# Patient Record
Sex: MALE | Race: WHITE | Employment: OTHER | URBAN - METROPOLITAN AREA
[De-identification: names, ages, dates, MRNs, and addresses within clinical notes are randomized per-mention and may not be internally consistent; named-entity substitution may affect disease eponyms.]

---

## 2017-11-06 ENCOUNTER — HOSPITAL ENCOUNTER (OUTPATIENT)
Dept: CT IMAGING | Age: 82
Discharge: HOME | End: 2017-11-06
Attending: PHYSICAL MEDICINE & REHABILITATION
Payer: MEDICARE

## 2017-11-06 DIAGNOSIS — M54.41 LOW BACK PAIN WITH RIGHT-SIDED SCIATICA: ICD-10-CM

## 2017-11-06 DIAGNOSIS — M47.817 LUMBOSACRAL SPONDYLOSIS WITHOUT MYELOPATHY: ICD-10-CM

## 2017-11-06 PROCEDURE — 72131 CT LUMBAR SPINE W/O DYE: CPT

## 2019-01-01 ENCOUNTER — HOSPITAL ENCOUNTER (INPATIENT)
Age: 84
LOS: 4 days | Discharge: HOME | DRG: 872 | End: 2019-11-01
Attending: EMERGENCY MEDICINE | Admitting: INTERNAL MEDICINE
Payer: MEDICARE

## 2019-01-01 ENCOUNTER — APPOINTMENT (OUTPATIENT)
Dept: ULTRASOUND IMAGING | Age: 84
End: 2019-01-01
Attending: EMERGENCY MEDICINE
Payer: MEDICARE

## 2019-01-01 ENCOUNTER — APPOINTMENT (OUTPATIENT)
Dept: NON INVASIVE DIAGNOSTICS | Age: 84
DRG: 872 | End: 2019-01-01
Attending: INTERNAL MEDICINE
Payer: MEDICARE

## 2019-01-01 ENCOUNTER — APPOINTMENT (OUTPATIENT)
Dept: GENERAL RADIOLOGY | Age: 84
DRG: 291 | End: 2019-01-01
Attending: EMERGENCY MEDICINE
Payer: MEDICARE

## 2019-01-01 ENCOUNTER — HOSPITAL ENCOUNTER (OUTPATIENT)
Dept: GENERAL RADIOLOGY | Age: 84
Discharge: HOME | End: 2019-11-26
Payer: MEDICARE

## 2019-01-01 ENCOUNTER — APPOINTMENT (OUTPATIENT)
Dept: ULTRASOUND IMAGING | Age: 84
DRG: 291 | End: 2019-01-01
Attending: INTERNAL MEDICINE
Payer: MEDICARE

## 2019-01-01 ENCOUNTER — APPOINTMENT (OUTPATIENT)
Dept: CT IMAGING | Age: 84
DRG: 872 | End: 2019-01-01
Attending: INTERNAL MEDICINE
Payer: MEDICARE

## 2019-01-01 ENCOUNTER — HOSPITAL ENCOUNTER (INPATIENT)
Age: 84
LOS: 2 days | DRG: 951 | End: 2019-12-24
Attending: FAMILY MEDICINE | Admitting: INTERNAL MEDICINE
Payer: OTHER MISCELLANEOUS

## 2019-01-01 ENCOUNTER — APPOINTMENT (OUTPATIENT)
Dept: GENERAL RADIOLOGY | Age: 84
DRG: 872 | End: 2019-01-01
Attending: EMERGENCY MEDICINE
Payer: MEDICARE

## 2019-01-01 ENCOUNTER — HOSPITAL ENCOUNTER (EMERGENCY)
Age: 84
Discharge: HOME | End: 2019-05-09
Attending: EMERGENCY MEDICINE
Payer: MEDICARE

## 2019-01-01 ENCOUNTER — HOSPICE ADMISSION (OUTPATIENT)
Dept: HOSPICE | Facility: HOSPICE | Age: 84
End: 2019-01-01
Payer: MEDICARE

## 2019-01-01 ENCOUNTER — HOSPITAL ENCOUNTER (INPATIENT)
Age: 84
LOS: 3 days | Discharge: HOSPICE | DRG: 291 | End: 2019-12-22
Attending: EMERGENCY MEDICINE | Admitting: INTERNAL MEDICINE
Payer: MEDICARE

## 2019-01-01 ENCOUNTER — APPOINTMENT (OUTPATIENT)
Dept: GENERAL RADIOLOGY | Age: 84
DRG: 872 | End: 2019-01-01
Attending: NURSE PRACTITIONER
Payer: MEDICARE

## 2019-01-01 ENCOUNTER — APPOINTMENT (OUTPATIENT)
Dept: VASCULAR SURGERY | Age: 84
DRG: 872 | End: 2019-01-01
Attending: EMERGENCY MEDICINE
Payer: MEDICARE

## 2019-01-01 VITALS
DIASTOLIC BLOOD PRESSURE: 75 MMHG | SYSTOLIC BLOOD PRESSURE: 119 MMHG | HEART RATE: 89 BPM | OXYGEN SATURATION: 94 % | HEIGHT: 72 IN | BODY MASS INDEX: 28.84 KG/M2 | RESPIRATION RATE: 18 BRPM | TEMPERATURE: 98.5 F | WEIGHT: 212.9 LBS

## 2019-01-01 VITALS
HEART RATE: 106 BPM | SYSTOLIC BLOOD PRESSURE: 82 MMHG | DIASTOLIC BLOOD PRESSURE: 53 MMHG | OXYGEN SATURATION: 94 % | RESPIRATION RATE: 16 BRPM | TEMPERATURE: 98.6 F

## 2019-01-01 VITALS
OXYGEN SATURATION: 100 % | BODY MASS INDEX: 31.75 KG/M2 | HEIGHT: 70 IN | TEMPERATURE: 97.4 F | DIASTOLIC BLOOD PRESSURE: 67 MMHG | SYSTOLIC BLOOD PRESSURE: 103 MMHG | WEIGHT: 221.78 LBS | HEART RATE: 106 BPM | RESPIRATION RATE: 20 BRPM

## 2019-01-01 VITALS
RESPIRATION RATE: 16 BRPM | WEIGHT: 210.76 LBS | HEART RATE: 89 BPM | DIASTOLIC BLOOD PRESSURE: 78 MMHG | BODY MASS INDEX: 27.05 KG/M2 | TEMPERATURE: 97.4 F | OXYGEN SATURATION: 99 % | HEIGHT: 74 IN | SYSTOLIC BLOOD PRESSURE: 134 MMHG

## 2019-01-01 DIAGNOSIS — R53.1 GENERALIZED WEAKNESS: ICD-10-CM

## 2019-01-01 DIAGNOSIS — I95.9 HYPOTENSION, UNSPECIFIED HYPOTENSION TYPE: Primary | ICD-10-CM

## 2019-01-01 DIAGNOSIS — F41.9 ANXIETY: ICD-10-CM

## 2019-01-01 DIAGNOSIS — Z71.89 GOALS OF CARE, COUNSELING/DISCUSSION: ICD-10-CM

## 2019-01-01 DIAGNOSIS — I50.20 HEART FAILURE WITH REDUCED EJECTION FRACTION, NYHA CLASS III (HCC): ICD-10-CM

## 2019-01-01 DIAGNOSIS — K11.7 INCREASED OROPHARYNGEAL SECRETIONS: ICD-10-CM

## 2019-01-01 DIAGNOSIS — R63.0 ANOREXIA: ICD-10-CM

## 2019-01-01 DIAGNOSIS — L98.429 STAGE 2 SKIN ULCER OF SACRAL REGION (HCC): ICD-10-CM

## 2019-01-01 DIAGNOSIS — I50.22 CHRONIC SYSTOLIC CHF (CONGESTIVE HEART FAILURE) (HCC): ICD-10-CM

## 2019-01-01 DIAGNOSIS — R50.9 FEVER, UNSPECIFIED FEVER CAUSE: Primary | ICD-10-CM

## 2019-01-01 DIAGNOSIS — R06.00 DYSPNEA, UNSPECIFIED TYPE: ICD-10-CM

## 2019-01-01 DIAGNOSIS — L03.116 CELLULITIS OF LEFT LOWER EXTREMITY: ICD-10-CM

## 2019-01-01 DIAGNOSIS — R52 GENERALIZED PAIN: ICD-10-CM

## 2019-01-01 DIAGNOSIS — R60.0 LEG EDEMA: Primary | ICD-10-CM

## 2019-01-01 DIAGNOSIS — E87.70 FLUID EXCESS: ICD-10-CM

## 2019-01-01 DIAGNOSIS — Z51.5 COMFORT MEASURES ONLY STATUS: ICD-10-CM

## 2019-01-01 LAB
ALBUMIN SERPL-MCNC: 3.1 G/DL (ref 3.5–5)
ALBUMIN SERPL-MCNC: 3.3 G/DL (ref 3.5–5)
ALBUMIN SERPL-MCNC: 3.4 G/DL (ref 3.5–5)
ALBUMIN SERPL-MCNC: 3.8 G/DL (ref 3.5–5)
ALBUMIN/GLOB SERPL: 0.8 {RATIO} (ref 1.1–2.2)
ALBUMIN/GLOB SERPL: 0.9 {RATIO} (ref 1.1–2.2)
ALBUMIN/GLOB SERPL: 1 {RATIO} (ref 1.1–2.2)
ALP SERPL-CCNC: 58 U/L (ref 45–117)
ALP SERPL-CCNC: 78 U/L (ref 45–117)
ALP SERPL-CCNC: 98 U/L (ref 45–117)
ALT SERPL-CCNC: 14 U/L (ref 12–78)
ALT SERPL-CCNC: 16 U/L (ref 12–78)
ALT SERPL-CCNC: 22 U/L (ref 12–78)
ANION GAP SERPL CALC-SCNC: 3 MMOL/L (ref 5–15)
ANION GAP SERPL CALC-SCNC: 3 MMOL/L (ref 5–15)
ANION GAP SERPL CALC-SCNC: 5 MMOL/L (ref 5–15)
ANION GAP SERPL CALC-SCNC: 6 MMOL/L (ref 5–15)
ANION GAP SERPL CALC-SCNC: 7 MMOL/L (ref 5–15)
ANION GAP SERPL CALC-SCNC: 8 MMOL/L (ref 5–15)
APPEARANCE UR: CLEAR
APPEARANCE UR: CLEAR
AST SERPL-CCNC: 17 U/L (ref 15–37)
AST SERPL-CCNC: 20 U/L (ref 15–37)
AST SERPL-CCNC: 31 U/L (ref 15–37)
ATRIAL RATE: 70 BPM
ATRIAL RATE: 91 BPM
AV VELOCITY RATIO: 0.65
BACTERIA SPEC CULT: NORMAL
BACTERIA URNS QL MICRO: NEGATIVE /HPF
BACTERIA URNS QL MICRO: NEGATIVE /HPF
BASOPHILS # BLD: 0 K/UL (ref 0–0.1)
BASOPHILS NFR BLD: 0 % (ref 0–1)
BASOPHILS NFR BLD: 1 % (ref 0–1)
BASOPHILS NFR BLD: 1 % (ref 0–1)
BILIRUB SERPL-MCNC: 1 MG/DL (ref 0.2–1)
BILIRUB SERPL-MCNC: 1.3 MG/DL (ref 0.2–1)
BILIRUB SERPL-MCNC: 1.4 MG/DL (ref 0.2–1)
BILIRUB UR QL: NEGATIVE
BILIRUB UR QL: NEGATIVE
BNP SERPL-MCNC: 3863 PG/ML
BNP SERPL-MCNC: ABNORMAL PG/ML
BNP SERPL-MCNC: ABNORMAL PG/ML
BUN SERPL-MCNC: 15 MG/DL (ref 6–20)
BUN SERPL-MCNC: 16 MG/DL (ref 6–20)
BUN SERPL-MCNC: 16 MG/DL (ref 6–20)
BUN SERPL-MCNC: 23 MG/DL (ref 6–20)
BUN SERPL-MCNC: 28 MG/DL (ref 6–20)
BUN SERPL-MCNC: 30 MG/DL (ref 6–20)
BUN SERPL-MCNC: 36 MG/DL (ref 6–20)
BUN SERPL-MCNC: 38 MG/DL (ref 6–20)
BUN SERPL-MCNC: 39 MG/DL (ref 6–20)
BUN SERPL-MCNC: 43 MG/DL (ref 6–20)
BUN/CREAT SERPL: 15 (ref 12–20)
BUN/CREAT SERPL: 16 (ref 12–20)
BUN/CREAT SERPL: 17 (ref 12–20)
BUN/CREAT SERPL: 17 (ref 12–20)
BUN/CREAT SERPL: 18 (ref 12–20)
BUN/CREAT SERPL: 19 (ref 12–20)
BUN/CREAT SERPL: 20 (ref 12–20)
BUN/CREAT SERPL: 21 (ref 12–20)
BUN/CREAT SERPL: 26 (ref 12–20)
BUN/CREAT SERPL: 27 (ref 12–20)
CALCIUM SERPL-MCNC: 8.2 MG/DL (ref 8.5–10.1)
CALCIUM SERPL-MCNC: 8.4 MG/DL (ref 8.5–10.1)
CALCIUM SERPL-MCNC: 8.5 MG/DL (ref 8.5–10.1)
CALCIUM SERPL-MCNC: 8.5 MG/DL (ref 8.5–10.1)
CALCIUM SERPL-MCNC: 8.6 MG/DL (ref 8.5–10.1)
CALCIUM SERPL-MCNC: 8.6 MG/DL (ref 8.5–10.1)
CALCIUM SERPL-MCNC: 8.8 MG/DL (ref 8.5–10.1)
CALCIUM SERPL-MCNC: 9.2 MG/DL (ref 8.5–10.1)
CALCIUM SERPL-MCNC: 9.3 MG/DL (ref 8.5–10.1)
CALCIUM SERPL-MCNC: 9.4 MG/DL (ref 8.5–10.1)
CALCULATED P AXIS, ECG09: 55 DEGREES
CALCULATED R AXIS, ECG10: 14 DEGREES
CALCULATED R AXIS, ECG10: 39 DEGREES
CALCULATED T AXIS, ECG11: 162 DEGREES
CALCULATED T AXIS, ECG11: 55 DEGREES
CHLORIDE SERPL-SCNC: 105 MMOL/L (ref 97–108)
CHLORIDE SERPL-SCNC: 105 MMOL/L (ref 97–108)
CHLORIDE SERPL-SCNC: 107 MMOL/L (ref 97–108)
CHLORIDE SERPL-SCNC: 110 MMOL/L (ref 97–108)
CHLORIDE SERPL-SCNC: 111 MMOL/L (ref 97–108)
CHLORIDE SERPL-SCNC: 112 MMOL/L (ref 97–108)
CHLORIDE SERPL-SCNC: 113 MMOL/L (ref 97–108)
CHLORIDE SERPL-SCNC: 113 MMOL/L (ref 97–108)
CHLORIDE UR-SCNC: 14 MMOL/L
CO2 SERPL-SCNC: 23 MMOL/L (ref 21–32)
CO2 SERPL-SCNC: 25 MMOL/L (ref 21–32)
CO2 SERPL-SCNC: 26 MMOL/L (ref 21–32)
CO2 SERPL-SCNC: 26 MMOL/L (ref 21–32)
CO2 SERPL-SCNC: 27 MMOL/L (ref 21–32)
CO2 SERPL-SCNC: 27 MMOL/L (ref 21–32)
CO2 SERPL-SCNC: 29 MMOL/L (ref 21–32)
COLOR UR: ABNORMAL
COLOR UR: ABNORMAL
COMMENT, HOLDF: NORMAL
COMMENT, HOLDF: NORMAL
CREAT SERPL-MCNC: 0.94 MG/DL (ref 0.7–1.3)
CREAT SERPL-MCNC: 0.96 MG/DL (ref 0.7–1.3)
CREAT SERPL-MCNC: 1.03 MG/DL (ref 0.7–1.3)
CREAT SERPL-MCNC: 1.08 MG/DL (ref 0.7–1.3)
CREAT SERPL-MCNC: 1.09 MG/DL (ref 0.7–1.3)
CREAT SERPL-MCNC: 1.1 MG/DL (ref 0.7–1.3)
CREAT SERPL-MCNC: 1.83 MG/DL (ref 0.7–1.3)
CREAT SERPL-MCNC: 2.21 MG/DL (ref 0.7–1.3)
CREAT SERPL-MCNC: 2.21 MG/DL (ref 0.7–1.3)
CREAT SERPL-MCNC: 2.35 MG/DL (ref 0.7–1.3)
CREAT UR-MCNC: 193 MG/DL
DATE LAST DOSE: ABNORMAL
DIAGNOSIS, 93000: NORMAL
DIAGNOSIS, 93000: NORMAL
DIFFERENTIAL METHOD BLD: ABNORMAL
ECHO AO ROOT DIAM: 3.47 CM
ECHO AV AREA PEAK VELOCITY: 2.2 CM2
ECHO AV PEAK GRADIENT: 6.5 MMHG
ECHO AV PEAK VELOCITY: 127.28 CM/S
ECHO LA AREA 4C: 33.9 CM2
ECHO LA MAJOR AXIS: 6.56 CM
ECHO LA TO AORTIC ROOT RATIO: 1.89
ECHO LA VOL 2C: 188.69 ML (ref 18–58)
ECHO LA VOL 4C: 124.71 ML (ref 18–58)
ECHO LA VOL BP: 165.55 ML (ref 18–58)
ECHO LA VOL/BSA BIPLANE: 77.54 ML/M2 (ref 16–28)
ECHO LA VOLUME INDEX A2C: 88.38 ML/M2 (ref 16–28)
ECHO LA VOLUME INDEX A4C: 58.41 ML/M2 (ref 16–28)
ECHO LV INTERNAL DIMENSION DIASTOLIC: 5.96 CM (ref 4.2–5.9)
ECHO LV INTERNAL DIMENSION SYSTOLIC: 5.62 CM
ECHO LV IVSD: 1.03 CM (ref 0.6–1)
ECHO LV MASS 2D: 296.6 G (ref 88–224)
ECHO LV MASS INDEX 2D: 138.9 G/M2 (ref 49–115)
ECHO LV POSTERIOR WALL DIASTOLIC: 1 CM (ref 0.6–1)
ECHO LVOT DIAM: 2.06 CM
ECHO LVOT PEAK GRADIENT: 2.8 MMHG
ECHO LVOT PEAK VELOCITY: 82.97 CM/S
ECHO LVOT SV: 48.9 ML
ECHO LVOT VTI: 14.74 CM
ECHO MV A VELOCITY: 85.73 CM/S
ECHO MV AREA PHT: 4.9 CM2
ECHO MV E DECELERATION TIME (DT): 155.4 MS
ECHO MV E VELOCITY: 62.49 CM/S
ECHO MV E/A RATIO: 0.73
ECHO MV PRESSURE HALF TIME (PHT): 45.1 MS
ECHO PV MAX VELOCITY: 61.78 CM/S
ECHO PV PEAK GRADIENT: 1.5 MMHG
ECHO TV REGURGITANT MAX VELOCITY: 234.97 CM/S
ECHO TV REGURGITANT PEAK GRADIENT: 22.1 MMHG
EOSINOPHIL # BLD: 0 K/UL (ref 0–0.4)
EOSINOPHIL # BLD: 0 K/UL (ref 0–0.4)
EOSINOPHIL # BLD: 0.1 K/UL (ref 0–0.4)
EOSINOPHIL # BLD: 0.1 K/UL (ref 0–0.4)
EOSINOPHIL # BLD: 0.3 K/UL (ref 0–0.4)
EOSINOPHIL NFR BLD: 0 % (ref 0–7)
EOSINOPHIL NFR BLD: 0 % (ref 0–7)
EOSINOPHIL NFR BLD: 1 % (ref 0–7)
EOSINOPHIL NFR BLD: 2 % (ref 0–7)
EOSINOPHIL NFR BLD: 7 % (ref 0–7)
EPITH CASTS URNS QL MICRO: ABNORMAL /LPF
EPITH CASTS URNS QL MICRO: ABNORMAL /LPF
ERYTHROCYTE [DISTWIDTH] IN BLOOD BY AUTOMATED COUNT: 14.9 % (ref 11.5–14.5)
ERYTHROCYTE [DISTWIDTH] IN BLOOD BY AUTOMATED COUNT: 15.1 % (ref 11.5–14.5)
ERYTHROCYTE [DISTWIDTH] IN BLOOD BY AUTOMATED COUNT: 15.2 % (ref 11.5–14.5)
ERYTHROCYTE [DISTWIDTH] IN BLOOD BY AUTOMATED COUNT: 18.6 % (ref 11.5–14.5)
ERYTHROCYTE [DISTWIDTH] IN BLOOD BY AUTOMATED COUNT: 18.6 % (ref 11.5–14.5)
ERYTHROCYTE [DISTWIDTH] IN BLOOD BY AUTOMATED COUNT: 19.1 % (ref 11.5–14.5)
ERYTHROCYTE [DISTWIDTH] IN BLOOD BY AUTOMATED COUNT: 19.1 % (ref 11.5–14.5)
EST. AVERAGE GLUCOSE BLD GHB EST-MCNC: 105 MG/DL
FLUAV AG NPH QL IA: NEGATIVE
FLUBV AG NOSE QL IA: NEGATIVE
GLOBULIN SER CALC-MCNC: 3.3 G/DL (ref 2–4)
GLOBULIN SER CALC-MCNC: 3.9 G/DL (ref 2–4)
GLOBULIN SER CALC-MCNC: 4 G/DL (ref 2–4)
GLUCOSE SERPL-MCNC: 101 MG/DL (ref 65–100)
GLUCOSE SERPL-MCNC: 116 MG/DL (ref 65–100)
GLUCOSE SERPL-MCNC: 130 MG/DL (ref 65–100)
GLUCOSE SERPL-MCNC: 139 MG/DL (ref 65–100)
GLUCOSE SERPL-MCNC: 86 MG/DL (ref 65–100)
GLUCOSE SERPL-MCNC: 88 MG/DL (ref 65–100)
GLUCOSE SERPL-MCNC: 89 MG/DL (ref 65–100)
GLUCOSE SERPL-MCNC: 92 MG/DL (ref 65–100)
GLUCOSE SERPL-MCNC: 94 MG/DL (ref 65–100)
GLUCOSE SERPL-MCNC: 95 MG/DL (ref 65–100)
GLUCOSE UR STRIP.AUTO-MCNC: NEGATIVE MG/DL
GLUCOSE UR STRIP.AUTO-MCNC: NEGATIVE MG/DL
HBA1C MFR BLD: 5.3 % (ref 4.2–6.3)
HCT VFR BLD AUTO: 32.7 % (ref 36.6–50.3)
HCT VFR BLD AUTO: 33.7 % (ref 36.6–50.3)
HCT VFR BLD AUTO: 33.7 % (ref 36.6–50.3)
HCT VFR BLD AUTO: 34.6 % (ref 36.6–50.3)
HCT VFR BLD AUTO: 38 % (ref 36.6–50.3)
HCT VFR BLD AUTO: 38.8 % (ref 36.6–50.3)
HCT VFR BLD AUTO: 40.7 % (ref 36.6–50.3)
HGB BLD-MCNC: 11.2 G/DL (ref 12.1–17)
HGB BLD-MCNC: 11.4 G/DL (ref 12.1–17)
HGB BLD-MCNC: 11.4 G/DL (ref 12.1–17)
HGB BLD-MCNC: 11.5 G/DL (ref 12.1–17)
HGB BLD-MCNC: 12.5 G/DL (ref 12.1–17)
HGB BLD-MCNC: 13.2 G/DL (ref 12.1–17)
HGB BLD-MCNC: 13.7 G/DL (ref 12.1–17)
HGB UR QL STRIP: ABNORMAL
HGB UR QL STRIP: ABNORMAL
HYALINE CASTS URNS QL MICRO: ABNORMAL /LPF (ref 0–5)
HYALINE CASTS URNS QL MICRO: ABNORMAL /LPF (ref 0–5)
IMM GRANULOCYTES # BLD AUTO: 0 K/UL (ref 0–0.04)
IMM GRANULOCYTES NFR BLD AUTO: 0 % (ref 0–0.5)
IMM GRANULOCYTES NFR BLD AUTO: 1 % (ref 0–0.5)
INR PPP: 2.5 (ref 0.9–1.1)
KETONES UR QL STRIP.AUTO: ABNORMAL MG/DL
KETONES UR QL STRIP.AUTO: NEGATIVE MG/DL
LACTATE BLD-SCNC: 1.56 MMOL/L (ref 0.4–2)
LEUKOCYTE ESTERASE UR QL STRIP.AUTO: ABNORMAL
LEUKOCYTE ESTERASE UR QL STRIP.AUTO: NEGATIVE
LIPASE SERPL-CCNC: 95 U/L (ref 73–393)
LVFS 2D: 5.8 %
LVOT MG: 1.21 MMHG
LVOT MV: 0.5 CM/S
LYMPHOCYTES # BLD: 0.2 K/UL (ref 0.8–3.5)
LYMPHOCYTES # BLD: 0.3 K/UL (ref 0.8–3.5)
LYMPHOCYTES # BLD: 0.3 K/UL (ref 0.8–3.5)
LYMPHOCYTES # BLD: 0.8 K/UL (ref 0.8–3.5)
LYMPHOCYTES # BLD: 1.1 K/UL (ref 0.8–3.5)
LYMPHOCYTES NFR BLD: 18 % (ref 12–49)
LYMPHOCYTES NFR BLD: 30 % (ref 12–49)
LYMPHOCYTES NFR BLD: 5 % (ref 12–49)
LYMPHOCYTES NFR BLD: 6 % (ref 12–49)
LYMPHOCYTES NFR BLD: 9 % (ref 12–49)
MAGNESIUM SERPL-MCNC: 1.9 MG/DL (ref 1.6–2.4)
MAGNESIUM SERPL-MCNC: 2.1 MG/DL (ref 1.6–2.4)
MCH RBC QN AUTO: 31.1 PG (ref 26–34)
MCH RBC QN AUTO: 31.1 PG (ref 26–34)
MCH RBC QN AUTO: 31.3 PG (ref 26–34)
MCH RBC QN AUTO: 31.8 PG (ref 26–34)
MCH RBC QN AUTO: 31.9 PG (ref 26–34)
MCH RBC QN AUTO: 32 PG (ref 26–34)
MCH RBC QN AUTO: 32.3 PG (ref 26–34)
MCHC RBC AUTO-ENTMCNC: 32.9 G/DL (ref 30–36.5)
MCHC RBC AUTO-ENTMCNC: 33.2 G/DL (ref 30–36.5)
MCHC RBC AUTO-ENTMCNC: 33.7 G/DL (ref 30–36.5)
MCHC RBC AUTO-ENTMCNC: 33.8 G/DL (ref 30–36.5)
MCHC RBC AUTO-ENTMCNC: 33.8 G/DL (ref 30–36.5)
MCHC RBC AUTO-ENTMCNC: 34 G/DL (ref 30–36.5)
MCHC RBC AUTO-ENTMCNC: 34.3 G/DL (ref 30–36.5)
MCV RBC AUTO: 91.8 FL (ref 80–99)
MCV RBC AUTO: 92.5 FL (ref 80–99)
MCV RBC AUTO: 93.9 FL (ref 80–99)
MCV RBC AUTO: 94.2 FL (ref 80–99)
MCV RBC AUTO: 94.3 FL (ref 80–99)
MCV RBC AUTO: 94.4 FL (ref 80–99)
MCV RBC AUTO: 96.7 FL (ref 80–99)
MONOCYTES # BLD: 0 K/UL (ref 0–1)
MONOCYTES # BLD: 0.2 K/UL (ref 0–1)
MONOCYTES # BLD: 0.2 K/UL (ref 0–1)
MONOCYTES # BLD: 0.4 K/UL (ref 0–1)
MONOCYTES # BLD: 0.4 K/UL (ref 0–1)
MONOCYTES NFR BLD: 1 % (ref 5–13)
MONOCYTES NFR BLD: 10 % (ref 5–13)
MONOCYTES NFR BLD: 4 % (ref 5–13)
MONOCYTES NFR BLD: 5 % (ref 5–13)
MONOCYTES NFR BLD: 8 % (ref 5–13)
MV DEC SLOPE: 4.02
NEUTS SEG # BLD: 2.2 K/UL (ref 1.8–8)
NEUTS SEG # BLD: 3.1 K/UL (ref 1.8–8)
NEUTS SEG # BLD: 3.3 K/UL (ref 1.8–8)
NEUTS SEG # BLD: 3.5 K/UL (ref 1.8–8)
NEUTS SEG # BLD: 4.3 K/UL (ref 1.8–8)
NEUTS SEG NFR BLD: 57 % (ref 32–75)
NEUTS SEG NFR BLD: 67 % (ref 32–75)
NEUTS SEG NFR BLD: 88 % (ref 32–75)
NEUTS SEG NFR BLD: 89 % (ref 32–75)
NEUTS SEG NFR BLD: 91 % (ref 32–75)
NITRITE UR QL STRIP.AUTO: NEGATIVE
NITRITE UR QL STRIP.AUTO: NEGATIVE
NRBC # BLD: 0 K/UL (ref 0–0.01)
NRBC BLD-RTO: 0 PER 100 WBC
P-R INTERVAL, ECG05: 230 MS
PH UR STRIP: 5 [PH] (ref 5–8)
PH UR STRIP: 6 [PH] (ref 5–8)
PHOSPHATE SERPL-MCNC: 3.2 MG/DL (ref 2.6–4.7)
PHOSPHATE SERPL-MCNC: 4.1 MG/DL (ref 2.6–4.7)
PLATELET # BLD AUTO: 51 K/UL (ref 150–400)
PLATELET # BLD AUTO: 56 K/UL (ref 150–400)
PLATELET # BLD AUTO: 58 K/UL (ref 150–400)
PLATELET # BLD AUTO: 60 K/UL (ref 150–400)
PLATELET # BLD AUTO: 69 K/UL (ref 150–400)
PLATELET # BLD AUTO: 76 K/UL (ref 150–400)
PLATELET # BLD AUTO: 89 K/UL (ref 150–400)
PMV BLD AUTO: 10.2 FL (ref 8.9–12.9)
PMV BLD AUTO: 10.4 FL (ref 8.9–12.9)
PMV BLD AUTO: 10.6 FL (ref 8.9–12.9)
PMV BLD AUTO: 10.7 FL (ref 8.9–12.9)
PMV BLD AUTO: 10.8 FL (ref 8.9–12.9)
PMV BLD AUTO: 11 FL (ref 8.9–12.9)
PMV BLD AUTO: 9.9 FL (ref 8.9–12.9)
POTASSIUM SERPL-SCNC: 3.6 MMOL/L (ref 3.5–5.1)
POTASSIUM SERPL-SCNC: 3.6 MMOL/L (ref 3.5–5.1)
POTASSIUM SERPL-SCNC: 3.7 MMOL/L (ref 3.5–5.1)
POTASSIUM SERPL-SCNC: 4.1 MMOL/L (ref 3.5–5.1)
POTASSIUM SERPL-SCNC: 4.2 MMOL/L (ref 3.5–5.1)
POTASSIUM SERPL-SCNC: 4.2 MMOL/L (ref 3.5–5.1)
POTASSIUM SERPL-SCNC: 4.3 MMOL/L (ref 3.5–5.1)
POTASSIUM SERPL-SCNC: 4.5 MMOL/L (ref 3.5–5.1)
POTASSIUM SERPL-SCNC: 4.7 MMOL/L (ref 3.5–5.1)
POTASSIUM SERPL-SCNC: 4.8 MMOL/L (ref 3.5–5.1)
PROT SERPL-MCNC: 6.4 G/DL (ref 6.4–8.2)
PROT SERPL-MCNC: 7.2 G/DL (ref 6.4–8.2)
PROT SERPL-MCNC: 7.8 G/DL (ref 6.4–8.2)
PROT UR STRIP-MCNC: 30 MG/DL
PROT UR STRIP-MCNC: NEGATIVE MG/DL
PROTHROMBIN TIME: 24.4 SEC (ref 9–11.1)
Q-T INTERVAL, ECG07: 400 MS
Q-T INTERVAL, ECG07: 420 MS
QRS DURATION, ECG06: 110 MS
QRS DURATION, ECG06: 84 MS
QTC CALCULATION (BEZET), ECG08: 459 MS
QTC CALCULATION (BEZET), ECG08: 492 MS
RBC # BLD AUTO: 3.47 M/UL (ref 4.1–5.7)
RBC # BLD AUTO: 3.57 M/UL (ref 4.1–5.7)
RBC # BLD AUTO: 3.67 M/UL (ref 4.1–5.7)
RBC # BLD AUTO: 3.67 M/UL (ref 4.1–5.7)
RBC # BLD AUTO: 3.93 M/UL (ref 4.1–5.7)
RBC # BLD AUTO: 4.13 M/UL (ref 4.1–5.7)
RBC # BLD AUTO: 4.4 M/UL (ref 4.1–5.7)
RBC #/AREA URNS HPF: ABNORMAL /HPF (ref 0–5)
RBC #/AREA URNS HPF: ABNORMAL /HPF (ref 0–5)
RBC MORPH BLD: ABNORMAL
REPORTED DOSE,DOSE: ABNORMAL UNITS
REPORTED DOSE/TIME,TMG: ABNORMAL
SAMPLES BEING HELD,HOLD: NORMAL
SAMPLES BEING HELD,HOLD: NORMAL
SERVICE CMNT-IMP: NORMAL
SERVICE CMNT-IMP: NORMAL
SODIUM SERPL-SCNC: 137 MMOL/L (ref 136–145)
SODIUM SERPL-SCNC: 140 MMOL/L (ref 136–145)
SODIUM SERPL-SCNC: 141 MMOL/L (ref 136–145)
SODIUM SERPL-SCNC: 141 MMOL/L (ref 136–145)
SODIUM SERPL-SCNC: 142 MMOL/L (ref 136–145)
SODIUM SERPL-SCNC: 143 MMOL/L (ref 136–145)
SODIUM UR-SCNC: 19 MMOL/L
SP GR UR REFRACTOMETRY: 1.02 (ref 1–1.03)
SP GR UR REFRACTOMETRY: 1.02 (ref 1–1.03)
TROPONIN I SERPL-MCNC: 0.07 NG/ML
TROPONIN I SERPL-MCNC: 0.07 NG/ML
TROPONIN I SERPL-MCNC: 0.09 NG/ML
TROPONIN I SERPL-MCNC: 0.1 NG/ML
TROPONIN I SERPL-MCNC: 0.11 NG/ML
TROPONIN I SERPL-MCNC: <0.05 NG/ML
TSH SERPL DL<=0.05 MIU/L-ACNC: 1.42 UIU/ML (ref 0.36–3.74)
UR CULT HOLD, URHOLD: NORMAL
UROBILINOGEN UR QL STRIP.AUTO: 0.2 EU/DL (ref 0.2–1)
UROBILINOGEN UR QL STRIP.AUTO: 1 EU/DL (ref 0.2–1)
VANCOMYCIN TROUGH SERPL-MCNC: 14.9 UG/ML (ref 5–10)
VENTRICULAR RATE, ECG03: 72 BPM
VENTRICULAR RATE, ECG03: 91 BPM
WBC # BLD AUTO: 3.6 K/UL (ref 4.1–11.1)
WBC # BLD AUTO: 3.6 K/UL (ref 4.1–11.1)
WBC # BLD AUTO: 3.7 K/UL (ref 4.1–11.1)
WBC # BLD AUTO: 3.9 K/UL (ref 4.1–11.1)
WBC # BLD AUTO: 4.7 K/UL (ref 4.1–11.1)
WBC # BLD AUTO: 4.9 K/UL (ref 4.1–11.1)
WBC # BLD AUTO: 8.6 K/UL (ref 4.1–11.1)
WBC URNS QL MICRO: ABNORMAL /HPF (ref 0–4)
WBC URNS QL MICRO: ABNORMAL /HPF (ref 0–4)

## 2019-01-01 PROCEDURE — 97165 OT EVAL LOW COMPLEX 30 MIN: CPT

## 2019-01-01 PROCEDURE — 84300 ASSAY OF URINE SODIUM: CPT

## 2019-01-01 PROCEDURE — 85025 COMPLETE CBC W/AUTO DIFF WBC: CPT

## 2019-01-01 PROCEDURE — P9045 ALBUMIN (HUMAN), 5%, 250 ML: HCPCS | Performed by: INTERNAL MEDICINE

## 2019-01-01 PROCEDURE — 74011250637 HC RX REV CODE- 250/637: Performed by: INTERNAL MEDICINE

## 2019-01-01 PROCEDURE — 36415 COLL VENOUS BLD VENIPUNCTURE: CPT

## 2019-01-01 PROCEDURE — 80048 BASIC METABOLIC PNL TOTAL CA: CPT

## 2019-01-01 PROCEDURE — 83605 ASSAY OF LACTIC ACID: CPT

## 2019-01-01 PROCEDURE — 74011000250 HC RX REV CODE- 250: Performed by: INTERNAL MEDICINE

## 2019-01-01 PROCEDURE — 74011250636 HC RX REV CODE- 250/636: Performed by: INTERNAL MEDICINE

## 2019-01-01 PROCEDURE — 97535 SELF CARE MNGMENT TRAINING: CPT

## 2019-01-01 PROCEDURE — 71275 CT ANGIOGRAPHY CHEST: CPT

## 2019-01-01 PROCEDURE — 74011250637 HC RX REV CODE- 250/637: Performed by: NURSE PRACTITIONER

## 2019-01-01 PROCEDURE — 94760 N-INVAS EAR/PLS OXIMETRY 1: CPT

## 2019-01-01 PROCEDURE — 74011000258 HC RX REV CODE- 258: Performed by: INTERNAL MEDICINE

## 2019-01-01 PROCEDURE — 65660000000 HC RM CCU STEPDOWN

## 2019-01-01 PROCEDURE — P9047 ALBUMIN (HUMAN), 25%, 50ML: HCPCS | Performed by: INTERNAL MEDICINE

## 2019-01-01 PROCEDURE — 0656 HSPC GENERAL INPATIENT

## 2019-01-01 PROCEDURE — 96365 THER/PROPH/DIAG IV INF INIT: CPT

## 2019-01-01 PROCEDURE — 65270000029 HC RM PRIVATE

## 2019-01-01 PROCEDURE — 74011250636 HC RX REV CODE- 250/636: Performed by: FAMILY MEDICINE

## 2019-01-01 PROCEDURE — 65270000032 HC RM SEMIPRIVATE

## 2019-01-01 PROCEDURE — 83690 ASSAY OF LIPASE: CPT

## 2019-01-01 PROCEDURE — 93971 EXTREMITY STUDY: CPT

## 2019-01-01 PROCEDURE — 80202 ASSAY OF VANCOMYCIN: CPT

## 2019-01-01 PROCEDURE — 093K7ZZ CONTROL BLEEDING IN NASAL MUCOSA AND SOFT TISSUE, VIA NATURAL OR ARTIFICIAL OPENING: ICD-10-PCS | Performed by: OTOLARYNGOLOGY

## 2019-01-01 PROCEDURE — 74011636320 HC RX REV CODE- 636/320: Performed by: RADIOLOGY

## 2019-01-01 PROCEDURE — 76770 US EXAM ABDO BACK WALL COMP: CPT

## 2019-01-01 PROCEDURE — 80053 COMPREHEN METABOLIC PANEL: CPT

## 2019-01-01 PROCEDURE — 83880 ASSAY OF NATRIURETIC PEPTIDE: CPT

## 2019-01-01 PROCEDURE — 82570 ASSAY OF URINE CREATININE: CPT

## 2019-01-01 PROCEDURE — 99284 EMERGENCY DEPT VISIT MOD MDM: CPT

## 2019-01-01 PROCEDURE — 84484 ASSAY OF TROPONIN QUANT: CPT

## 2019-01-01 PROCEDURE — 74011250636 HC RX REV CODE- 250/636: Performed by: EMERGENCY MEDICINE

## 2019-01-01 PROCEDURE — 65610000006 HC RM INTENSIVE CARE

## 2019-01-01 PROCEDURE — 81001 URINALYSIS AUTO W/SCOPE: CPT

## 2019-01-01 PROCEDURE — 85610 PROTHROMBIN TIME: CPT

## 2019-01-01 PROCEDURE — 83735 ASSAY OF MAGNESIUM: CPT

## 2019-01-01 PROCEDURE — 99223 1ST HOSP IP/OBS HIGH 75: CPT | Performed by: FAMILY MEDICINE

## 2019-01-01 PROCEDURE — 82436 ASSAY OF URINE CHLORIDE: CPT

## 2019-01-01 PROCEDURE — 85027 COMPLETE CBC AUTOMATED: CPT

## 2019-01-01 PROCEDURE — 71045 X-RAY EXAM CHEST 1 VIEW: CPT

## 2019-01-01 PROCEDURE — 87804 INFLUENZA ASSAY W/OPTIC: CPT

## 2019-01-01 PROCEDURE — 96375 TX/PRO/DX INJ NEW DRUG ADDON: CPT

## 2019-01-01 PROCEDURE — 83036 HEMOGLOBIN GLYCOSYLATED A1C: CPT

## 2019-01-01 PROCEDURE — 94664 DEMO&/EVAL PT USE INHALER: CPT

## 2019-01-01 PROCEDURE — 93005 ELECTROCARDIOGRAM TRACING: CPT

## 2019-01-01 PROCEDURE — 65660000001 HC RM ICU INTERMED STEPDOWN

## 2019-01-01 PROCEDURE — 94640 AIRWAY INHALATION TREATMENT: CPT

## 2019-01-01 PROCEDURE — 97116 GAIT TRAINING THERAPY: CPT

## 2019-01-01 PROCEDURE — 97530 THERAPEUTIC ACTIVITIES: CPT

## 2019-01-01 PROCEDURE — 74011000258 HC RX REV CODE- 258: Performed by: EMERGENCY MEDICINE

## 2019-01-01 PROCEDURE — 74177 CT ABD & PELVIS W/CONTRAST: CPT

## 2019-01-01 PROCEDURE — 80069 RENAL FUNCTION PANEL: CPT

## 2019-01-01 PROCEDURE — 99285 EMERGENCY DEPT VISIT HI MDM: CPT

## 2019-01-01 PROCEDURE — 84443 ASSAY THYROID STIM HORMONE: CPT

## 2019-01-01 PROCEDURE — 84100 ASSAY OF PHOSPHORUS: CPT

## 2019-01-01 PROCEDURE — 74011000250 HC RX REV CODE- 250: Performed by: NURSE PRACTITIONER

## 2019-01-01 PROCEDURE — 71046 X-RAY EXAM CHEST 2 VIEWS: CPT

## 2019-01-01 PROCEDURE — 77010033678 HC OXYGEN DAILY

## 2019-01-01 PROCEDURE — 93306 TTE W/DOPPLER COMPLETE: CPT

## 2019-01-01 PROCEDURE — 3336500001 HSPC ELECTION

## 2019-01-01 PROCEDURE — 74011250637 HC RX REV CODE- 250/637: Performed by: EMERGENCY MEDICINE

## 2019-01-01 PROCEDURE — 74011000258 HC RX REV CODE- 258: Performed by: RADIOLOGY

## 2019-01-01 PROCEDURE — 99282 EMERGENCY DEPT VISIT SF MDM: CPT

## 2019-01-01 PROCEDURE — 87040 BLOOD CULTURE FOR BACTERIA: CPT

## 2019-01-01 PROCEDURE — 97161 PT EVAL LOW COMPLEX 20 MIN: CPT

## 2019-01-01 RX ORDER — HYDROCODONE BITARTRATE AND ACETAMINOPHEN 5; 325 MG/1; MG/1
1 TABLET ORAL
COMMUNITY

## 2019-01-01 RX ORDER — SERTRALINE HYDROCHLORIDE 50 MG/1
50 TABLET, FILM COATED ORAL DAILY
Status: DISCONTINUED | OUTPATIENT
Start: 2019-01-01 | End: 2019-01-01

## 2019-01-01 RX ORDER — SODIUM CHLORIDE, SODIUM LACTATE, POTASSIUM CHLORIDE, CALCIUM CHLORIDE 600; 310; 30; 20 MG/100ML; MG/100ML; MG/100ML; MG/100ML
100 INJECTION, SOLUTION INTRAVENOUS CONTINUOUS
Status: DISCONTINUED | OUTPATIENT
Start: 2019-01-01 | End: 2019-01-01

## 2019-01-01 RX ORDER — FUROSEMIDE 20 MG/1
40 TABLET ORAL DAILY
Refills: 3 | Status: ON HOLD | COMMUNITY
Start: 2019-01-01 | End: 2019-01-01 | Stop reason: SDUPTHER

## 2019-01-01 RX ORDER — OXYMETAZOLINE HCL 0.05 %
2 SPRAY, NON-AEROSOL (ML) NASAL
Status: COMPLETED | OUTPATIENT
Start: 2019-01-01 | End: 2019-01-01

## 2019-01-01 RX ORDER — FINASTERIDE 5 MG/1
5 TABLET, FILM COATED ORAL DAILY
COMMUNITY

## 2019-01-01 RX ORDER — LORAZEPAM 2 MG/ML
1 INJECTION INTRAMUSCULAR
Status: DISCONTINUED | OUTPATIENT
Start: 2019-01-01 | End: 2019-01-01 | Stop reason: HOSPADM

## 2019-01-01 RX ORDER — SODIUM CHLORIDE 0.9 % (FLUSH) 0.9 %
5-40 SYRINGE (ML) INJECTION EVERY 8 HOURS
Status: DISCONTINUED | OUTPATIENT
Start: 2019-01-01 | End: 2019-01-01 | Stop reason: HOSPADM

## 2019-01-01 RX ORDER — GLYCOPYRROLATE 0.2 MG/ML
0.2 INJECTION INTRAMUSCULAR; INTRAVENOUS
Status: DISCONTINUED | OUTPATIENT
Start: 2019-01-01 | End: 2019-01-01 | Stop reason: HOSPADM

## 2019-01-01 RX ORDER — KETOROLAC TROMETHAMINE 30 MG/ML
30 INJECTION, SOLUTION INTRAMUSCULAR; INTRAVENOUS
Status: DISCONTINUED | OUTPATIENT
Start: 2019-01-01 | End: 2019-01-01

## 2019-01-01 RX ORDER — IPRATROPIUM BROMIDE AND ALBUTEROL SULFATE 2.5; .5 MG/3ML; MG/3ML
3 SOLUTION RESPIRATORY (INHALATION)
Status: COMPLETED | OUTPATIENT
Start: 2019-01-01 | End: 2019-01-01

## 2019-01-01 RX ORDER — SODIUM CHLORIDE 0.9 % (FLUSH) 0.9 %
5-40 SYRINGE (ML) INJECTION AS NEEDED
Status: DISCONTINUED | OUTPATIENT
Start: 2019-01-01 | End: 2019-01-01 | Stop reason: HOSPADM

## 2019-01-01 RX ORDER — SODIUM CHLORIDE 9 MG/ML
50 INJECTION, SOLUTION INTRAVENOUS CONTINUOUS
Status: DISCONTINUED | OUTPATIENT
Start: 2019-01-01 | End: 2019-01-01

## 2019-01-01 RX ORDER — HYDROMORPHONE HYDROCHLORIDE 1 MG/ML
0.5 INJECTION, SOLUTION INTRAMUSCULAR; INTRAVENOUS; SUBCUTANEOUS
Status: DISCONTINUED | OUTPATIENT
Start: 2019-01-01 | End: 2019-01-01

## 2019-01-01 RX ORDER — TAMSULOSIN HYDROCHLORIDE 0.4 MG/1
0.4 CAPSULE ORAL DAILY
Status: DISCONTINUED | OUTPATIENT
Start: 2019-01-01 | End: 2019-01-01

## 2019-01-01 RX ORDER — METOPROLOL SUCCINATE 25 MG/1
TABLET, EXTENDED RELEASE ORAL
Refills: 1 | Status: ON HOLD | COMMUNITY
Start: 2019-01-01 | End: 2019-01-01

## 2019-01-01 RX ORDER — DIPHENHYDRAMINE HYDROCHLORIDE 50 MG/ML
25 INJECTION, SOLUTION INTRAMUSCULAR; INTRAVENOUS
Status: DISCONTINUED | OUTPATIENT
Start: 2019-01-01 | End: 2019-01-01 | Stop reason: HOSPADM

## 2019-01-01 RX ORDER — GABAPENTIN 300 MG/1
300 CAPSULE ORAL 2 TIMES DAILY
COMMUNITY

## 2019-01-01 RX ORDER — KETOROLAC TROMETHAMINE 30 MG/ML
30 INJECTION, SOLUTION INTRAMUSCULAR; INTRAVENOUS
Status: DISCONTINUED | OUTPATIENT
Start: 2019-01-01 | End: 2019-01-01 | Stop reason: HOSPADM

## 2019-01-01 RX ORDER — VANCOMYCIN 2 GRAM/500 ML IN 0.9 % SODIUM CHLORIDE INTRAVENOUS
2000 ONCE
Status: COMPLETED | OUTPATIENT
Start: 2019-01-01 | End: 2019-01-01

## 2019-01-01 RX ORDER — METOPROLOL SUCCINATE 25 MG/1
12.5 TABLET, EXTENDED RELEASE ORAL DAILY
Status: DISCONTINUED | OUTPATIENT
Start: 2019-01-01 | End: 2019-01-01 | Stop reason: HOSPADM

## 2019-01-01 RX ORDER — HYDROMORPHONE HYDROCHLORIDE 1 MG/ML
1 INJECTION, SOLUTION INTRAMUSCULAR; INTRAVENOUS; SUBCUTANEOUS
Status: DISCONTINUED | OUTPATIENT
Start: 2019-01-01 | End: 2019-01-01 | Stop reason: HOSPADM

## 2019-01-01 RX ORDER — HYDROCODONE BITARTRATE AND ACETAMINOPHEN 5; 325 MG/1; MG/1
1 TABLET ORAL
Status: DISCONTINUED | OUTPATIENT
Start: 2019-01-01 | End: 2019-01-01 | Stop reason: HOSPADM

## 2019-01-01 RX ORDER — ONDANSETRON 2 MG/ML
4 INJECTION INTRAMUSCULAR; INTRAVENOUS
Status: DISCONTINUED | OUTPATIENT
Start: 2019-01-01 | End: 2019-01-01 | Stop reason: HOSPADM

## 2019-01-01 RX ORDER — GABAPENTIN 300 MG/1
300 CAPSULE ORAL 3 TIMES DAILY
Status: DISCONTINUED | OUTPATIENT
Start: 2019-01-01 | End: 2019-01-01 | Stop reason: HOSPADM

## 2019-01-01 RX ORDER — BALSAM PERU/CASTOR OIL
OINTMENT (GRAM) TOPICAL EVERY 8 HOURS
Status: DISCONTINUED | OUTPATIENT
Start: 2019-01-01 | End: 2019-01-01 | Stop reason: HOSPADM

## 2019-01-01 RX ORDER — HYDROMORPHONE HYDROCHLORIDE 1 MG/ML
0.4 INJECTION, SOLUTION INTRAMUSCULAR; INTRAVENOUS; SUBCUTANEOUS
Status: DISCONTINUED | OUTPATIENT
Start: 2019-01-01 | End: 2019-01-01

## 2019-01-01 RX ORDER — CLINDAMYCIN HYDROCHLORIDE 300 MG/1
300 CAPSULE ORAL 3 TIMES DAILY
Qty: 12 CAP | Refills: 0 | Status: SHIPPED | OUTPATIENT
Start: 2019-01-01 | End: 2019-01-01 | Stop reason: ALTCHOICE

## 2019-01-01 RX ORDER — DOBUTAMINE HYDROCHLORIDE 200 MG/100ML
5 INJECTION INTRAVENOUS CONTINUOUS
Status: DISCONTINUED | OUTPATIENT
Start: 2019-01-01 | End: 2019-01-01

## 2019-01-01 RX ORDER — ATORVASTATIN CALCIUM 20 MG/1
20 TABLET, FILM COATED ORAL
Status: DISCONTINUED | OUTPATIENT
Start: 2019-01-01 | End: 2019-01-01 | Stop reason: HOSPADM

## 2019-01-01 RX ORDER — SODIUM CHLORIDE 0.9 % (FLUSH) 0.9 %
SYRINGE (ML) INJECTION
Status: COMPLETED
Start: 2019-01-01 | End: 2019-01-01

## 2019-01-01 RX ORDER — LORAZEPAM 2 MG/ML
1 CONCENTRATE ORAL
Status: DISCONTINUED | OUTPATIENT
Start: 2019-01-01 | End: 2019-01-01

## 2019-01-01 RX ORDER — HYDROMORPHONE HYDROCHLORIDE 1 MG/ML
0.5 INJECTION, SOLUTION INTRAMUSCULAR; INTRAVENOUS; SUBCUTANEOUS
Status: DISCONTINUED | OUTPATIENT
Start: 2019-01-01 | End: 2019-01-01 | Stop reason: HOSPADM

## 2019-01-01 RX ORDER — LORAZEPAM 2 MG/ML
0.5 INJECTION INTRAMUSCULAR
Status: DISCONTINUED | OUTPATIENT
Start: 2019-01-01 | End: 2019-01-01 | Stop reason: HOSPADM

## 2019-01-01 RX ORDER — ENALAPRIL MALEATE 2.5 MG/1
TABLET ORAL DAILY
Status: ON HOLD | COMMUNITY
End: 2019-01-01

## 2019-01-01 RX ORDER — LORAZEPAM 2 MG/ML
1 INJECTION INTRAMUSCULAR
Status: DISCONTINUED | OUTPATIENT
Start: 2019-01-01 | End: 2019-01-01

## 2019-01-01 RX ORDER — SIMVASTATIN 20 MG/1
20 TABLET, FILM COATED ORAL
Status: DISCONTINUED | OUTPATIENT
Start: 2019-01-01 | End: 2019-01-01

## 2019-01-01 RX ORDER — FUROSEMIDE 20 MG/1
20 TABLET ORAL EVERY OTHER DAY
Qty: 15 TAB | Refills: 3 | Status: SHIPPED | OUTPATIENT
Start: 2019-01-01 | End: 2019-01-01

## 2019-01-01 RX ORDER — SERTRALINE HYDROCHLORIDE 50 MG/1
50 TABLET, FILM COATED ORAL DAILY
Status: DISCONTINUED | OUTPATIENT
Start: 2019-01-01 | End: 2019-01-01 | Stop reason: HOSPADM

## 2019-01-01 RX ORDER — TAMSULOSIN HYDROCHLORIDE 0.4 MG/1
0.4 CAPSULE ORAL DAILY
COMMUNITY

## 2019-01-01 RX ORDER — VANCOMYCIN/0.9 % SOD CHLORIDE 1.5G/250ML
1500 PLASTIC BAG, INJECTION (ML) INTRAVENOUS EVERY 24 HOURS
Status: DISCONTINUED | OUTPATIENT
Start: 2019-01-01 | End: 2019-01-01

## 2019-01-01 RX ORDER — BISACODYL 5 MG
5 TABLET, DELAYED RELEASE (ENTERIC COATED) ORAL DAILY PRN
Status: DISCONTINUED | OUTPATIENT
Start: 2019-01-01 | End: 2019-01-01 | Stop reason: HOSPADM

## 2019-01-01 RX ORDER — SIMVASTATIN 20 MG/1
20 TABLET, FILM COATED ORAL
Status: DISCONTINUED | OUTPATIENT
Start: 2019-01-01 | End: 2019-01-01 | Stop reason: HOSPADM

## 2019-01-01 RX ORDER — ALBUTEROL SULFATE 0.83 MG/ML
2.5 SOLUTION RESPIRATORY (INHALATION)
Status: DISCONTINUED | OUTPATIENT
Start: 2019-01-01 | End: 2019-01-01 | Stop reason: HOSPADM

## 2019-01-01 RX ORDER — LORAZEPAM 2 MG/ML
1 CONCENTRATE ORAL
Status: DISCONTINUED | OUTPATIENT
Start: 2019-01-01 | End: 2019-01-01 | Stop reason: HOSPADM

## 2019-01-01 RX ORDER — ACETAMINOPHEN 325 MG/1
650 TABLET ORAL
Status: DISCONTINUED | OUTPATIENT
Start: 2019-01-01 | End: 2019-01-01 | Stop reason: HOSPADM

## 2019-01-01 RX ORDER — FUROSEMIDE 80 MG/1
80 TABLET ORAL DAILY
COMMUNITY

## 2019-01-01 RX ORDER — ENALAPRIL MALEATE 5 MG/1
5 TABLET ORAL DAILY
COMMUNITY

## 2019-01-01 RX ORDER — SODIUM CHLORIDE 9 MG/ML
75 INJECTION, SOLUTION INTRAVENOUS CONTINUOUS
Status: DISCONTINUED | OUTPATIENT
Start: 2019-01-01 | End: 2019-01-01

## 2019-01-01 RX ORDER — ALBUMIN HUMAN 250 G/1000ML
25 SOLUTION INTRAVENOUS EVERY 6 HOURS
Status: COMPLETED | OUTPATIENT
Start: 2019-01-01 | End: 2019-01-01

## 2019-01-01 RX ORDER — HYDROMORPHONE HYDROCHLORIDE 5 MG/5ML
2 SOLUTION ORAL
Status: DISCONTINUED | OUTPATIENT
Start: 2019-01-01 | End: 2019-01-01

## 2019-01-01 RX ORDER — SODIUM CHLORIDE 0.9 % (FLUSH) 0.9 %
10 SYRINGE (ML) INJECTION
Status: COMPLETED | OUTPATIENT
Start: 2019-01-01 | End: 2019-01-01

## 2019-01-01 RX ORDER — FACIAL-BODY WIPES
10 EACH TOPICAL DAILY PRN
Status: DISCONTINUED | OUTPATIENT
Start: 2019-01-01 | End: 2019-01-01 | Stop reason: HOSPADM

## 2019-01-01 RX ORDER — ALBUMIN HUMAN 50 G/1000ML
25 SOLUTION INTRAVENOUS ONCE
Status: COMPLETED | OUTPATIENT
Start: 2019-01-01 | End: 2019-01-01

## 2019-01-01 RX ORDER — CLINDAMYCIN HYDROCHLORIDE 150 MG/1
300 CAPSULE ORAL ONCE
Status: COMPLETED | OUTPATIENT
Start: 2019-01-01 | End: 2019-01-01

## 2019-01-01 RX ORDER — ACETAMINOPHEN 325 MG/1
975 TABLET ORAL
Status: COMPLETED | OUTPATIENT
Start: 2019-01-01 | End: 2019-01-01

## 2019-01-01 RX ORDER — BUMETANIDE 0.25 MG/ML
1 INJECTION INTRAMUSCULAR; INTRAVENOUS EVERY 12 HOURS
Status: DISCONTINUED | OUTPATIENT
Start: 2019-01-01 | End: 2019-01-01

## 2019-01-01 RX ORDER — METOLAZONE 2.5 MG/1
2.5 TABLET ORAL
COMMUNITY

## 2019-01-01 RX ORDER — SERTRALINE HYDROCHLORIDE 50 MG/1
50 TABLET, FILM COATED ORAL DAILY
COMMUNITY

## 2019-01-01 RX ORDER — METOPROLOL SUCCINATE 25 MG/1
12.5 TABLET, EXTENDED RELEASE ORAL DAILY
COMMUNITY

## 2019-01-01 RX ORDER — TAMSULOSIN HYDROCHLORIDE 0.4 MG/1
0.4 CAPSULE ORAL DAILY
Status: DISCONTINUED | OUTPATIENT
Start: 2019-01-01 | End: 2019-01-01 | Stop reason: HOSPADM

## 2019-01-01 RX ORDER — DOBUTAMINE HYDROCHLORIDE 200 MG/100ML
0-10 INJECTION INTRAVENOUS
Status: DISCONTINUED | OUTPATIENT
Start: 2019-01-01 | End: 2019-01-01

## 2019-01-01 RX ORDER — GABAPENTIN 400 MG/1
CAPSULE ORAL
Refills: 0 | Status: ON HOLD | COMMUNITY
Start: 2019-01-01 | End: 2019-01-01

## 2019-01-01 RX ORDER — LISINOPRIL 5 MG/1
2.5 TABLET ORAL DAILY
Status: DISCONTINUED | OUTPATIENT
Start: 2019-01-01 | End: 2019-01-01 | Stop reason: HOSPADM

## 2019-01-01 RX ORDER — ONDANSETRON 2 MG/ML
8 INJECTION INTRAMUSCULAR; INTRAVENOUS
Status: COMPLETED | OUTPATIENT
Start: 2019-01-01 | End: 2019-01-01

## 2019-01-01 RX ORDER — LISINOPRIL 5 MG/1
5 TABLET ORAL DAILY
Status: DISCONTINUED | OUTPATIENT
Start: 2019-01-01 | End: 2019-01-01

## 2019-01-01 RX ORDER — FUROSEMIDE 10 MG/ML
20 INJECTION INTRAMUSCULAR; INTRAVENOUS ONCE
Status: COMPLETED | OUTPATIENT
Start: 2019-01-01 | End: 2019-01-01

## 2019-01-01 RX ADMIN — DIPHENHYDRAMINE HYDROCHLORIDE 25 MG: 50 INJECTION, SOLUTION INTRAMUSCULAR; INTRAVENOUS at 00:57

## 2019-01-01 RX ADMIN — ACETAMINOPHEN 650 MG: 325 TABLET ORAL at 19:52

## 2019-01-01 RX ADMIN — GLYCOPYRROLATE 0.2 MG: 0.2 INJECTION, SOLUTION INTRAMUSCULAR; INTRAVENOUS at 08:09

## 2019-01-01 RX ADMIN — HYDROMORPHONE HYDROCHLORIDE 1 MG: 1 INJECTION, SOLUTION INTRAMUSCULAR; INTRAVENOUS; SUBCUTANEOUS at 15:08

## 2019-01-01 RX ADMIN — SERTRALINE HYDROCHLORIDE 50 MG: 50 TABLET ORAL at 08:33

## 2019-01-01 RX ADMIN — ACETAMINOPHEN 650 MG: 325 TABLET ORAL at 15:40

## 2019-01-01 RX ADMIN — PIPERACILLIN SODIUM AND TAZOBACTAM SODIUM 3.38 G: 3; .375 INJECTION, POWDER, LYOPHILIZED, FOR SOLUTION INTRAVENOUS at 18:57

## 2019-01-01 RX ADMIN — ALBUMIN (HUMAN) 25 G: 12.5 INJECTION, SOLUTION INTRAVENOUS at 01:20

## 2019-01-01 RX ADMIN — DOBUTAMINE IN DEXTROSE 3.5 MCG/KG/MIN: 200 INJECTION, SOLUTION INTRAVENOUS at 17:10

## 2019-01-01 RX ADMIN — Medication 10 ML: at 21:42

## 2019-01-01 RX ADMIN — HYDROMORPHONE HYDROCHLORIDE 2 MG: 5 LIQUID ORAL at 23:07

## 2019-01-01 RX ADMIN — HYDROCODONE BITARTRATE AND ACETAMINOPHEN 1 TABLET: 5; 325 TABLET ORAL at 08:11

## 2019-01-01 RX ADMIN — IOHEXOL 50 ML: 240 INJECTION, SOLUTION INTRATHECAL; INTRAVASCULAR; INTRAVENOUS; ORAL at 06:39

## 2019-01-01 RX ADMIN — ACETAMINOPHEN 650 MG: 325 TABLET ORAL at 08:41

## 2019-01-01 RX ADMIN — LORAZEPAM 1 MG: 2 INJECTION INTRAMUSCULAR; INTRAVENOUS at 08:09

## 2019-01-01 RX ADMIN — LORAZEPAM 1 MG: 2 SOLUTION, CONCENTRATE ORAL at 23:41

## 2019-01-01 RX ADMIN — METOPROLOL SUCCINATE 12.5 MG: 25 TABLET, EXTENDED RELEASE ORAL at 09:09

## 2019-01-01 RX ADMIN — PIPERACILLIN SODIUM AND TAZOBACTAM SODIUM 3.38 G: 3; .375 INJECTION, POWDER, LYOPHILIZED, FOR SOLUTION INTRAVENOUS at 18:21

## 2019-01-01 RX ADMIN — HYDROMORPHONE HYDROCHLORIDE 0.4 MG: 1 INJECTION, SOLUTION INTRAMUSCULAR; INTRAVENOUS; SUBCUTANEOUS at 07:16

## 2019-01-01 RX ADMIN — KETOROLAC TROMETHAMINE 30 MG: 30 INJECTION, SOLUTION INTRAMUSCULAR at 11:09

## 2019-01-01 RX ADMIN — ACETAMINOPHEN 975 MG: 325 TABLET, FILM COATED ORAL at 22:27

## 2019-01-01 RX ADMIN — ACETAMINOPHEN 650 MG: 325 TABLET ORAL at 01:53

## 2019-01-01 RX ADMIN — IOPAMIDOL 100 ML: 755 INJECTION, SOLUTION INTRAVENOUS at 06:39

## 2019-01-01 RX ADMIN — TAMSULOSIN HYDROCHLORIDE 0.4 MG: 0.4 CAPSULE ORAL at 08:33

## 2019-01-01 RX ADMIN — BUMETANIDE 1 MG: 0.25 INJECTION INTRAMUSCULAR; INTRAVENOUS at 09:46

## 2019-01-01 RX ADMIN — Medication 10 ML: at 05:18

## 2019-01-01 RX ADMIN — HYDROMORPHONE HYDROCHLORIDE 0.4 MG: 1 INJECTION, SOLUTION INTRAMUSCULAR; INTRAVENOUS; SUBCUTANEOUS at 11:09

## 2019-01-01 RX ADMIN — ACETAMINOPHEN 650 MG: 325 TABLET ORAL at 06:59

## 2019-01-01 RX ADMIN — ATORVASTATIN CALCIUM 20 MG: 20 TABLET, FILM COATED ORAL at 21:59

## 2019-01-01 RX ADMIN — Medication 1 CAPSULE: at 08:41

## 2019-01-01 RX ADMIN — Medication 10 ML: at 12:51

## 2019-01-01 RX ADMIN — SODIUM CHLORIDE, SODIUM LACTATE, POTASSIUM CHLORIDE, AND CALCIUM CHLORIDE 100 ML/HR: 600; 310; 30; 20 INJECTION, SOLUTION INTRAVENOUS at 18:53

## 2019-01-01 RX ADMIN — Medication 1 CAPSULE: at 09:00

## 2019-01-01 RX ADMIN — RIVAROXABAN 15 MG: 15 TABLET, FILM COATED ORAL at 18:44

## 2019-01-01 RX ADMIN — Medication 10 ML: at 01:24

## 2019-01-01 RX ADMIN — SERTRALINE HYDROCHLORIDE 50 MG: 50 TABLET ORAL at 09:42

## 2019-01-01 RX ADMIN — Medication 10 ML: at 15:41

## 2019-01-01 RX ADMIN — LORAZEPAM 1 MG: 2 INJECTION INTRAMUSCULAR; INTRAVENOUS at 13:29

## 2019-01-01 RX ADMIN — LORAZEPAM 1 MG: 2 SOLUTION, CONCENTRATE ORAL at 13:47

## 2019-01-01 RX ADMIN — SODIUM CHLORIDE, SODIUM LACTATE, POTASSIUM CHLORIDE, AND CALCIUM CHLORIDE 100 ML/HR: 600; 310; 30; 20 INJECTION, SOLUTION INTRAVENOUS at 06:27

## 2019-01-01 RX ADMIN — CASTOR OIL AND BALSAM, PERU: 788; 87 OINTMENT TOPICAL at 22:22

## 2019-01-01 RX ADMIN — VANCOMYCIN HYDROCHLORIDE 1500 MG: 10 INJECTION, POWDER, LYOPHILIZED, FOR SOLUTION INTRAVENOUS at 03:00

## 2019-01-01 RX ADMIN — GABAPENTIN 300 MG: 300 CAPSULE ORAL at 21:42

## 2019-01-01 RX ADMIN — RIVAROXABAN 15 MG: 15 TABLET, FILM COATED ORAL at 16:38

## 2019-01-01 RX ADMIN — Medication 1 CAPSULE: at 08:10

## 2019-01-01 RX ADMIN — GABAPENTIN 300 MG: 300 CAPSULE ORAL at 08:42

## 2019-01-01 RX ADMIN — DOBUTAMINE IN DEXTROSE 2.5 MCG/KG/MIN: 200 INJECTION, SOLUTION INTRAVENOUS at 06:20

## 2019-01-01 RX ADMIN — LORAZEPAM 1 MG: 2 SOLUTION, CONCENTRATE ORAL at 08:52

## 2019-01-01 RX ADMIN — PIPERACILLIN SODIUM AND TAZOBACTAM SODIUM 3.38 G: 3; .375 INJECTION, POWDER, LYOPHILIZED, FOR SOLUTION INTRAVENOUS at 03:01

## 2019-01-01 RX ADMIN — LORAZEPAM 1 MG: 2 SOLUTION, CONCENTRATE ORAL at 19:17

## 2019-01-01 RX ADMIN — LORAZEPAM 1 MG: 2 INJECTION INTRAMUSCULAR; INTRAVENOUS at 11:10

## 2019-01-01 RX ADMIN — DOBUTAMINE IN DEXTROSE 3 MCG/KG/MIN: 200 INJECTION, SOLUTION INTRAVENOUS at 18:54

## 2019-01-01 RX ADMIN — SODIUM CHLORIDE, SODIUM LACTATE, POTASSIUM CHLORIDE, AND CALCIUM CHLORIDE 100 ML/HR: 600; 310; 30; 20 INJECTION, SOLUTION INTRAVENOUS at 11:04

## 2019-01-01 RX ADMIN — KETOROLAC TROMETHAMINE 30 MG: 30 INJECTION, SOLUTION INTRAMUSCULAR at 09:36

## 2019-01-01 RX ADMIN — PIPERACILLIN SODIUM AND TAZOBACTAM SODIUM 3.38 G: 3; .375 INJECTION, POWDER, LYOPHILIZED, FOR SOLUTION INTRAVENOUS at 19:13

## 2019-01-01 RX ADMIN — Medication 10 ML: at 20:41

## 2019-01-01 RX ADMIN — ALBUMIN (HUMAN) 25 G: 0.25 INJECTION, SOLUTION INTRAVENOUS at 01:26

## 2019-01-01 RX ADMIN — HYDROMORPHONE HYDROCHLORIDE 0.4 MG: 1 INJECTION, SOLUTION INTRAMUSCULAR; INTRAVENOUS; SUBCUTANEOUS at 23:26

## 2019-01-01 RX ADMIN — SODIUM CHLORIDE 75 ML/HR: 900 INJECTION, SOLUTION INTRAVENOUS at 00:02

## 2019-01-01 RX ADMIN — GABAPENTIN 300 MG: 300 CAPSULE ORAL at 15:51

## 2019-01-01 RX ADMIN — SERTRALINE HYDROCHLORIDE 50 MG: 50 TABLET ORAL at 09:46

## 2019-01-01 RX ADMIN — SODIUM CHLORIDE, SODIUM LACTATE, POTASSIUM CHLORIDE, AND CALCIUM CHLORIDE 100 ML/HR: 600; 310; 30; 20 INJECTION, SOLUTION INTRAVENOUS at 08:52

## 2019-01-01 RX ADMIN — DOBUTAMINE IN DEXTROSE 2.5 MCG/KG/MIN: 200 INJECTION, SOLUTION INTRAVENOUS at 15:19

## 2019-01-01 RX ADMIN — Medication 10 ML: at 06:39

## 2019-01-01 RX ADMIN — KETOROLAC TROMETHAMINE 30 MG: 30 INJECTION, SOLUTION INTRAMUSCULAR at 18:55

## 2019-01-01 RX ADMIN — HYDROMORPHONE HYDROCHLORIDE 0.4 MG: 1 INJECTION, SOLUTION INTRAMUSCULAR; INTRAVENOUS; SUBCUTANEOUS at 13:29

## 2019-01-01 RX ADMIN — RIVAROXABAN 15 MG: 15 TABLET, FILM COATED ORAL at 16:54

## 2019-01-01 RX ADMIN — HYDROMORPHONE HYDROCHLORIDE 0.4 MG: 1 INJECTION, SOLUTION INTRAMUSCULAR; INTRAVENOUS; SUBCUTANEOUS at 18:35

## 2019-01-01 RX ADMIN — GABAPENTIN 300 MG: 300 CAPSULE ORAL at 21:38

## 2019-01-01 RX ADMIN — SIMVASTATIN 20 MG: 20 TABLET, FILM COATED ORAL at 21:12

## 2019-01-01 RX ADMIN — HYDROMORPHONE HYDROCHLORIDE 0.4 MG: 1 INJECTION, SOLUTION INTRAMUSCULAR; INTRAVENOUS; SUBCUTANEOUS at 02:35

## 2019-01-01 RX ADMIN — Medication 10 ML: at 05:05

## 2019-01-01 RX ADMIN — Medication 1 CAPSULE: at 08:35

## 2019-01-01 RX ADMIN — CASTOR OIL AND BALSAM, PERU: 788; 87 OINTMENT TOPICAL at 15:18

## 2019-01-01 RX ADMIN — Medication 10 ML: at 22:26

## 2019-01-01 RX ADMIN — LORAZEPAM 1 MG: 2 INJECTION INTRAMUSCULAR; INTRAVENOUS at 20:41

## 2019-01-01 RX ADMIN — HYDROMORPHONE HYDROCHLORIDE 2 MG: 5 LIQUID ORAL at 08:52

## 2019-01-01 RX ADMIN — CASTOR OIL AND BALSAM, PERU: 788; 87 OINTMENT TOPICAL at 21:14

## 2019-01-01 RX ADMIN — SIMVASTATIN 20 MG: 20 TABLET, FILM COATED ORAL at 21:42

## 2019-01-01 RX ADMIN — ALBUMIN (HUMAN) 25 G: 0.25 INJECTION, SOLUTION INTRAVENOUS at 18:37

## 2019-01-01 RX ADMIN — Medication 10 ML: at 06:59

## 2019-01-01 RX ADMIN — Medication 10 ML: at 06:38

## 2019-01-01 RX ADMIN — ACETAMINOPHEN 650 MG: 325 TABLET ORAL at 12:11

## 2019-01-01 RX ADMIN — Medication 10 ML: at 06:20

## 2019-01-01 RX ADMIN — DOBUTAMINE IN DEXTROSE 2.5 MCG/KG/MIN: 200 INJECTION, SOLUTION INTRAVENOUS at 09:48

## 2019-01-01 RX ADMIN — GABAPENTIN 300 MG: 300 CAPSULE ORAL at 08:10

## 2019-01-01 RX ADMIN — Medication 10 ML: at 22:15

## 2019-01-01 RX ADMIN — SODIUM CHLORIDE 50 ML/HR: 900 INJECTION, SOLUTION INTRAVENOUS at 07:24

## 2019-01-01 RX ADMIN — GABAPENTIN 300 MG: 300 CAPSULE ORAL at 15:40

## 2019-01-01 RX ADMIN — GABAPENTIN 300 MG: 300 CAPSULE ORAL at 08:35

## 2019-01-01 RX ADMIN — SODIUM CHLORIDE, SODIUM LACTATE, POTASSIUM CHLORIDE, AND CALCIUM CHLORIDE 500 ML: 600; 310; 30; 20 INJECTION, SOLUTION INTRAVENOUS at 08:30

## 2019-01-01 RX ADMIN — GABAPENTIN 300 MG: 300 CAPSULE ORAL at 21:59

## 2019-01-01 RX ADMIN — GLYCOPYRROLATE 0.2 MG: 0.2 INJECTION, SOLUTION INTRAMUSCULAR; INTRAVENOUS at 15:08

## 2019-01-01 RX ADMIN — PIPERACILLIN SODIUM AND TAZOBACTAM SODIUM 3.38 G: 3; .375 INJECTION, POWDER, LYOPHILIZED, FOR SOLUTION INTRAVENOUS at 03:53

## 2019-01-01 RX ADMIN — GABAPENTIN 300 MG: 300 CAPSULE ORAL at 08:41

## 2019-01-01 RX ADMIN — OXYMETAZOLINE HYDROCHLORIDE 2 SPRAY: 0.05 SPRAY NASAL at 11:49

## 2019-01-01 RX ADMIN — ALBUMIN (HUMAN) 25 G: 0.25 INJECTION, SOLUTION INTRAVENOUS at 06:38

## 2019-01-01 RX ADMIN — SIMVASTATIN 20 MG: 20 TABLET, FILM COATED ORAL at 21:38

## 2019-01-01 RX ADMIN — TAMSULOSIN HYDROCHLORIDE 0.4 MG: 0.4 CAPSULE ORAL at 09:42

## 2019-01-01 RX ADMIN — SIMVASTATIN 20 MG: 20 TABLET, FILM COATED ORAL at 22:26

## 2019-01-01 RX ADMIN — Medication 10 ML: at 16:14

## 2019-01-01 RX ADMIN — PIPERACILLIN SODIUM AND TAZOBACTAM SODIUM 3.38 G: 3; .375 INJECTION, POWDER, LYOPHILIZED, FOR SOLUTION INTRAVENOUS at 02:58

## 2019-01-01 RX ADMIN — Medication 10 ML: at 22:22

## 2019-01-01 RX ADMIN — SODIUM CHLORIDE 1000 ML: 900 INJECTION, SOLUTION INTRAVENOUS at 00:20

## 2019-01-01 RX ADMIN — ONDANSETRON 8 MG: 2 INJECTION INTRAMUSCULAR; INTRAVENOUS at 22:27

## 2019-01-01 RX ADMIN — Medication 10 ML: at 21:59

## 2019-01-01 RX ADMIN — CASTOR OIL AND BALSAM, PERU: 788; 87 OINTMENT TOPICAL at 14:34

## 2019-01-01 RX ADMIN — Medication 10 ML: at 13:57

## 2019-01-01 RX ADMIN — FUROSEMIDE 20 MG: 10 INJECTION, SOLUTION INTRAMUSCULAR; INTRAVENOUS at 09:54

## 2019-01-01 RX ADMIN — METHYLPREDNISOLONE SODIUM SUCCINATE 40 MG: 40 INJECTION, POWDER, FOR SOLUTION INTRAMUSCULAR; INTRAVENOUS at 04:40

## 2019-01-01 RX ADMIN — SODIUM CHLORIDE, SODIUM LACTATE, POTASSIUM CHLORIDE, AND CALCIUM CHLORIDE 100 ML/HR: 600; 310; 30; 20 INJECTION, SOLUTION INTRAVENOUS at 03:53

## 2019-01-01 RX ADMIN — PIPERACILLIN SODIUM AND TAZOBACTAM SODIUM 3.38 G: 3; .375 INJECTION, POWDER, LYOPHILIZED, FOR SOLUTION INTRAVENOUS at 11:05

## 2019-01-01 RX ADMIN — METOPROLOL SUCCINATE 12.5 MG: 25 TABLET, EXTENDED RELEASE ORAL at 08:42

## 2019-01-01 RX ADMIN — Medication 10 ML: at 22:00

## 2019-01-01 RX ADMIN — CASTOR OIL AND BALSAM, PERU: 788; 87 OINTMENT TOPICAL at 13:48

## 2019-01-01 RX ADMIN — SODIUM CHLORIDE 1000 ML: 900 INJECTION, SOLUTION INTRAVENOUS at 01:24

## 2019-01-01 RX ADMIN — LISINOPRIL 2.5 MG: 5 TABLET ORAL at 08:44

## 2019-01-01 RX ADMIN — CLINDAMYCIN HYDROCHLORIDE 300 MG: 150 CAPSULE ORAL at 10:51

## 2019-01-01 RX ADMIN — HYDROMORPHONE HYDROCHLORIDE 0.4 MG: 1 INJECTION, SOLUTION INTRAMUSCULAR; INTRAVENOUS; SUBCUTANEOUS at 08:08

## 2019-01-01 RX ADMIN — PIPERACILLIN SODIUM AND TAZOBACTAM SODIUM 3.38 G: 3; .375 INJECTION, POWDER, LYOPHILIZED, FOR SOLUTION INTRAVENOUS at 11:19

## 2019-01-01 RX ADMIN — SODIUM CHLORIDE, SODIUM LACTATE, POTASSIUM CHLORIDE, AND CALCIUM CHLORIDE 100 ML/HR: 600; 310; 30; 20 INJECTION, SOLUTION INTRAVENOUS at 02:50

## 2019-01-01 RX ADMIN — GABAPENTIN 300 MG: 300 CAPSULE ORAL at 16:14

## 2019-01-01 RX ADMIN — SODIUM CHLORIDE 500 ML: 900 INJECTION, SOLUTION INTRAVENOUS at 00:01

## 2019-01-01 RX ADMIN — AMPICILLIN SODIUM AND SULBACTAM SODIUM 3 G: 2; 1 INJECTION, POWDER, FOR SOLUTION INTRAMUSCULAR; INTRAVENOUS at 22:27

## 2019-01-01 RX ADMIN — VANCOMYCIN HYDROCHLORIDE 1500 MG: 10 INJECTION, POWDER, LYOPHILIZED, FOR SOLUTION INTRAVENOUS at 03:01

## 2019-01-01 RX ADMIN — SIMVASTATIN 20 MG: 20 TABLET, FILM COATED ORAL at 22:22

## 2019-01-01 RX ADMIN — Medication 1 CAPSULE: at 08:42

## 2019-01-01 RX ADMIN — GABAPENTIN 300 MG: 300 CAPSULE ORAL at 09:00

## 2019-01-01 RX ADMIN — LORAZEPAM 1 MG: 2 INJECTION INTRAMUSCULAR; INTRAVENOUS at 23:33

## 2019-01-01 RX ADMIN — SODIUM CHLORIDE, SODIUM LACTATE, POTASSIUM CHLORIDE, AND CALCIUM CHLORIDE 100 ML/HR: 600; 310; 30; 20 INJECTION, SOLUTION INTRAVENOUS at 16:23

## 2019-01-01 RX ADMIN — LORAZEPAM 1 MG: 2 INJECTION INTRAMUSCULAR; INTRAVENOUS at 15:09

## 2019-01-01 RX ADMIN — LORAZEPAM 1 MG: 2 INJECTION INTRAMUSCULAR; INTRAVENOUS at 16:49

## 2019-01-01 RX ADMIN — PIPERACILLIN SODIUM AND TAZOBACTAM SODIUM 3.38 G: 3; .375 INJECTION, POWDER, LYOPHILIZED, FOR SOLUTION INTRAVENOUS at 10:43

## 2019-01-01 RX ADMIN — PIPERACILLIN SODIUM AND TAZOBACTAM SODIUM 3.38 G: 3; .375 INJECTION, POWDER, LYOPHILIZED, FOR SOLUTION INTRAVENOUS at 03:10

## 2019-01-01 RX ADMIN — Medication 10 ML: at 21:08

## 2019-01-01 RX ADMIN — ATORVASTATIN CALCIUM 20 MG: 20 TABLET, FILM COATED ORAL at 22:15

## 2019-01-01 RX ADMIN — SODIUM CHLORIDE 500 ML: 900 INJECTION, SOLUTION INTRAVENOUS at 05:03

## 2019-01-01 RX ADMIN — TAMSULOSIN HYDROCHLORIDE 0.4 MG: 0.4 CAPSULE ORAL at 09:46

## 2019-01-01 RX ADMIN — VANCOMYCIN HYDROCHLORIDE 1500 MG: 10 INJECTION, POWDER, LYOPHILIZED, FOR SOLUTION INTRAVENOUS at 02:59

## 2019-01-01 RX ADMIN — Medication 10 ML: at 14:13

## 2019-01-01 RX ADMIN — Medication 10 ML: at 06:02

## 2019-01-01 RX ADMIN — SODIUM CHLORIDE 100 ML: 900 INJECTION, SOLUTION INTRAVENOUS at 06:39

## 2019-01-01 RX ADMIN — METHYLPREDNISOLONE SODIUM SUCCINATE 40 MG: 40 INJECTION, POWDER, FOR SOLUTION INTRAMUSCULAR; INTRAVENOUS at 15:19

## 2019-01-01 RX ADMIN — LORAZEPAM 1 MG: 2 SOLUTION, CONCENTRATE ORAL at 12:50

## 2019-01-01 RX ADMIN — GABAPENTIN 300 MG: 300 CAPSULE ORAL at 22:15

## 2019-01-01 RX ADMIN — CASTOR OIL AND BALSAM, PERU: 788; 87 OINTMENT TOPICAL at 06:20

## 2019-01-01 RX ADMIN — HYDROCODONE BITARTRATE AND ACETAMINOPHEN 1 TABLET: 5; 325 TABLET ORAL at 05:18

## 2019-01-01 RX ADMIN — HYDROCODONE BITARTRATE AND ACETAMINOPHEN 1 TABLET: 5; 325 TABLET ORAL at 00:01

## 2019-01-01 RX ADMIN — GABAPENTIN 300 MG: 300 CAPSULE ORAL at 16:23

## 2019-01-01 RX ADMIN — VANCOMYCIN HYDROCHLORIDE 2000 MG: 10 INJECTION, POWDER, LYOPHILIZED, FOR SOLUTION INTRAVENOUS at 02:50

## 2019-01-01 RX ADMIN — Medication 10 ML: at 14:35

## 2019-01-01 RX ADMIN — DIPHENHYDRAMINE HYDROCHLORIDE 25 MG: 50 INJECTION, SOLUTION INTRAMUSCULAR; INTRAVENOUS at 09:18

## 2019-01-01 RX ADMIN — SIMVASTATIN 20 MG: 20 TABLET, FILM COATED ORAL at 03:10

## 2019-01-01 RX ADMIN — CASTOR OIL AND BALSAM, PERU: 788; 87 OINTMENT TOPICAL at 08:33

## 2019-01-01 RX ADMIN — SODIUM CHLORIDE, SODIUM LACTATE, POTASSIUM CHLORIDE, AND CALCIUM CHLORIDE 100 ML/HR: 600; 310; 30; 20 INJECTION, SOLUTION INTRAVENOUS at 19:16

## 2019-01-01 RX ADMIN — HYDROCODONE BITARTRATE AND ACETAMINOPHEN 1 TABLET: 5; 325 TABLET ORAL at 00:12

## 2019-01-01 RX ADMIN — METOPROLOL SUCCINATE 12.5 MG: 25 TABLET, EXTENDED RELEASE ORAL at 11:20

## 2019-01-01 RX ADMIN — DIPHENHYDRAMINE HYDROCHLORIDE 25 MG: 50 INJECTION, SOLUTION INTRAMUSCULAR; INTRAVENOUS at 14:12

## 2019-01-01 RX ADMIN — SODIUM CHLORIDE 75 ML/HR: 900 INJECTION, SOLUTION INTRAVENOUS at 13:51

## 2019-01-01 RX ADMIN — IPRATROPIUM BROMIDE AND ALBUTEROL SULFATE 3 ML: .5; 3 SOLUTION RESPIRATORY (INHALATION) at 21:42

## 2019-05-09 NOTE — DISCHARGE INSTRUCTIONS
Patient Education        Leg and Ankle Edema: Care Instructions  Your Care Instructions  Swelling in the legs, ankles, and feet is called edema. It is common after you sit or stand for a while. Long plane flights or car rides often cause swelling in the legs and feet. You may also have swelling if you have to stand for long periods of time at your job. Problems with the veins in the legs (varicose veins) and changes in hormones can also cause swelling. Sometimes the swelling in the ankles and feet is caused by a more serious problem, such as heart failure, infection, blood clots, or liver or kidney disease. Follow-up care is a key part of your treatment and safety. Be sure to make and go to all appointments, and call your doctor if you are having problems. It's also a good idea to know your test results and keep a list of the medicines you take. How can you care for yourself at home? · If your doctor gave you medicine, take it as prescribed. Call your doctor if you think you are having a problem with your medicine. · Whenever you are resting, raise your legs up. Try to keep the swollen area higher than the level of your heart. · Take breaks from standing or sitting in one position. ? Walk around to increase the blood flow in your lower legs. ? Move your feet and ankles often while you stand, or tighten and relax your leg muscles. · Wear support stockings. Put them on in the morning, before swelling gets worse. · Eat a balanced diet. Lose weight if you need to. · Limit the amount of salt (sodium) in your diet. Salt holds fluid in the body and may increase swelling. When should you call for help? Call 911 anytime you think you may need emergency care. For example, call if:    · You have symptoms of a blood clot in your lung (called a pulmonary embolism). These may include:  ? Sudden chest pain. ? Trouble breathing. ?  Coughing up blood.    Call your doctor now or seek immediate medical care if:    · You have signs of a blood clot, such as:  ? Pain in your calf, back of the knee, thigh, or groin. ? Redness and swelling in your leg or groin.     · You have symptoms of infection, such as:  ? Increased pain, swelling, warmth, or redness. ? Red streaks or pus. ? A fever.    Watch closely for changes in your health, and be sure to contact your doctor if:    · Your swelling is getting worse.     · You have new or worsening pain in your legs.     · You do not get better as expected. Where can you learn more? Go to http://andrew-cortez.info/. Enter K093 in the search box to learn more about \"Leg and Ankle Edema: Care Instructions. \"  Current as of: September 23, 2018  Content Version: 11.9  © 8954-9203 [employer]. Care instructions adapted under license by [employer] (which disclaims liability or warranty for this information). If you have questions about a medical condition or this instruction, always ask your healthcare professional. [place] [address] any warranty or liability for your use of this information.

## 2019-05-09 NOTE — ED NOTES
Patient was discharged and given instructions by Dr. Gian Elizabeth. Patient verbalized good understanding of all discharge instructions, prescriptions and f/u care. All questions answered. Pt in stable condition on discharge.

## 2019-05-09 NOTE — ED TRIAGE NOTES
Triage note: Pt states his RLE is swollen and has been for about two months. States he can feel mild pain intermittently in the RLE.

## 2019-05-10 NOTE — ED PROVIDER NOTES
The history is provided by the patient. Leg Swelling This is a new problem. The current episode started more than 1 week ago (2 months). The problem occurs constantly. The problem has not changed since onset. The pain is present in the right lower leg. The pain is at a severity of 0/10. Pertinent negatives include no numbness, no stiffness, no tingling, no back pain and no neck pain. Associated symptoms comments: Denies Chest pain or SOB Candance Huger Exacerbated by: nothing. He has tried nothing for the symptoms. There has been no history of extremity trauma. Past Medical History:  
Diagnosis Date  Acute MI ([address]  Hypertension  Pacemaker 2013 Medtronic Pacer/Defibrillator that is not MRI compatible per [employer]. Past Surgical History:  
Procedure Laterality Date  CARDIAC SURG PROCEDURE UNLIST    
 cabg x 3  
 HX ORTHOPAEDIC    
 shoulder, hand and back surgery  HX PACEMAKER History reviewed. No pertinent family history. Social History Socioeconomic History  Marital status:  Spouse name: Not on file  Number of children: Not on file  Years of education: Not on file  Highest education level: Not on file Occupational History  Not on file Social Needs  Financial resource strain: Not on file  Food insecurity:  
  Worry: Not on file Inability: Not on file  Transportation needs:  
  Medical: Not on file Non-medical: Not on file Tobacco Use  Smoking status: Former Smoker Packs/day: 0.50 Years: 40.00 Pack years: 20.00 Last attempt to quit: 1984 Years since quittin.4 Substance and Sexual Activity  Alcohol use: Yes Alcohol/week: 3.5 oz Types: 7 Glasses of wine per week  Drug use: No  
 Sexual activity: Not on file Lifestyle  Physical activity:  
  Days per week: Not on file Minutes per session: Not on file  Stress: Not on file Relationships  Social connections:  
  Talks on phone: Not on file Gets together: Not on file Attends Je[employer]'s witness service: Not on file Active member of club or organization: Not on file Attends meetings of clubs or organizations: Not on file Relationship status: Not on file  Intimate partner violence:  
  Fear of current or ex partner: Not on file Emotionally abused: Not on file Physically abused: Not on file Forced sexual activity: Not on file Other Topics Concern  Not on file Social History Narrative  Not on file ALLERGIES: Prednisone Review of Systems Constitutional: Negative for chills and fever. HENT: Negative for ear pain and sore throat. Eyes: Negative for pain. Respiratory: Negative for chest tightness and shortness of breath. Cardiovascular: Negative for chest pain and leg swelling. Gastrointestinal: Negative for abdominal pain, nausea and vomiting. Genitourinary: Negative for dysuria and flank pain. Musculoskeletal: Negative for back pain, neck pain and stiffness. Skin: Negative for rash. Neurological: Negative for tingling, numbness and headaches. All other systems reviewed and are negative. Vitals:  
 05/09/19 1524 05/09/19 1525 05/09/19 1742 BP: 131/77  134/78 Pulse: 87  89 Resp: 16  16 Temp: 97.4 °F (36.3 °C) SpO2: 98%  99% Weight: 95.1 kg (209 lb 10.5 oz) 95.6 kg (210 lb 12.2 oz) Height: 6' 2\" (1.88 m) Physical Exam  
Constitutional: He is oriented to person, place, and time. He appears well-developed and well-nourished. HENT:  
Head: Normocephalic and atraumatic. Eyes: No scleral icterus. Neck: No tracheal deviation present. Cardiovascular: Normal rate, regular rhythm and normal heart sounds. Pulmonary/Chest: Effort normal.  
Abdominal: He exhibits no distension. Musculoskeletal: He exhibits edema (LE right> left). He exhibits no deformity. Neurological: He is alert and oriented to person, place, and time. Skin: Skin is warm and dry. Psychiatric: He has a normal mood and affect. Nursing note and vitals reviewed. MDM Number of Diagnoses or Management Options Leg edema:  
Diagnosis management comments: 81 yo male with leg swelling. Normal vitals. Not hypoxic. Duplex negative for DVT. Advised close pcp f/u/ Given return precautions Procedures Darcy Youngblood.  Sheyla Tejada MD

## 2019-10-28 PROBLEM — A41.9 SEPSIS (HCC): Status: ACTIVE | Noted: 2019-01-01

## 2019-10-28 NOTE — CONSULTS
CARDIOLOGY CONSULT Subjective: 
 
Date of  Admission: 10/27/2019  9:30 PM  
 
Admission type:Emergency Yefri Umana is a 80 y.o. male admitted for Sepsis ([facility] [address] [A41.9]. Presented with malaise and then F/C. Found to have sepsis related to cellulitis. There had been a small wound noted on his left leg which had swelling and redness which worsened. First troponin checked was negative in the ED. Subsequent checks mildly elevated. No clinical change in condition similar to ACS or CHF. BNP elevated and pt mildly hypoptensive. Meds have been held but he has not required vasopressor support. Patient Active Problem List  
 Diagnosis Date Noted  Sepsis ([facility] [address] 10/28/2019  Hematuria 12/30/2015  Fecal impaction ([facility] [address] 12/27/2015  Hypertension 12/27/2015  Urinary retention 12/27/2015  Rectal bleed 07/19/2015  Lower GI bleed 07/18/2015  Nausea & vomiting 11/25/2010 Yancy Lee MD 
Past Medical History:  
Diagnosis Date  Acute MI ([facility] [address]  Cancer ([facility] [address] Skin  Hypertension  Pacemaker 06/18/2013 Medtronic Pacer/Defibrillator that is not MRI compatible per [employer]. Past Surgical History:  
Procedure Laterality Date  CARDIAC SURG PROCEDURE UNLIST    
 cabg x 3  
 HX ORTHOPAEDIC    
 shoulder, hand and back surgery  HX PACEMAKER Allergies Allergen Reactions  Prednisone Other (comments) Bloats him History reviewed. No pertinent family history. Current Facility-Administered Medications Medication Dose Route Frequency  simvastatin (ZOCOR) tablet 20 mg  20 mg Oral QHS  gabapentin (NEURONTIN) capsule 300 mg  300 mg Oral TID  sodium chloride (NS) flush 5-40 mL  5-40 mL IntraVENous Q8H  
 sodium chloride (NS) flush 5-40 mL  5-40 mL IntraVENous PRN  
 acetaminophen (TYLENOL) tablet 650 mg  650 mg Oral Q4H PRN  
 ondansetron (ZOFRAN) injection 4 mg  4 mg IntraVENous Q4H PRN  
  bisacodyl (DULCOLAX) tablet 5 mg  5 mg Oral DAILY PRN  piperacillin-tazobactam (ZOSYN) 3.375 g in 0.9% sodium chloride (MBP/ADV) 100 mL  3.375 g IntraVENous Q8H  
 lactobac ac& pc-s.therm-b.anim (DESIREE Q/RISAQUAD)  1 Cap Oral DAILY  PHENYLephrine (CLARISSA-SYNEPHRINE) 30 mg in 0.9% sodium chloride 250 mL infusion   mcg/min IntraVENous TITRATE  lactated Ringers infusion  100 mL/hr IntraVENous CONTINUOUS  Vancomycin- Pharmacy to Dose   Other Rx Dosing/Monitoring  [START ON 10/29/2019] vancomycin (VANCOCIN) 1500 mg in  ml infusion  1,500 mg IntraVENous Q24H Review of Symptoms: 
Gen - no F/C/S Eyes - no vision changes ENT - no sore throat, rhinorrhea, otalgia CV - no CP, no palpitations, no orthopnea, no PND, no HIGINIO Resp no cough, no SOB/BO 
GI - no AP, no n/v/d/c 
 - no dysuria, no hematuria MSK - no abnormal joint pains Skin - no rashes Neuro - no HA, no numbness, no weakness, no slurred speech Psych - no change in mood Physical Exam 
 
Visit Vitals BP 93/56 Pulse 70 Temp 99.4 °F (37.4 °C) Resp 21 Ht 6' (1.829 m) Wt 91.3 kg (201 lb 4.5 oz) SpO2 96% BMI 27.30 kg/m² NAD Skin warm and dry Nl conjunctiva Oropharynx without exudate. Neck supple Lungs clear Normal S1/ S2 with occasional ectopy No Murmurs, click or Rubs Abdomen soft and non tender Pulses 2+ radials Neuro:  Grossly intact Appropriate Cardiographics Telemetry: paced ECG: paced Echocardiogram: pending Labs:  
Recent Results (from the past 24 hour(s)) POC LACTIC ACID Collection Time: 10/27/19  9:56 PM  
Result Value Ref Range Lactic Acid (POC) 1.56 0.40 - 2.00 mmol/L  
SAMPLES BEING HELD Collection Time: 10/27/19 10:01 PM  
Result Value Ref Range SAMPLES BEING HELD [id number] COMMENT Add-on orders for these samples will be processed based on acceptable specimen integrity and analyte stability, which may vary by analyte. CULTURE, BLOOD, PAIRED Collection Time: 10/27/19 10:01 PM  
Result Value Ref Range Special Requests: NO SPECIAL REQUESTS Culture result: NO GROWTH AFTER 5 HOURS    
CBC WITH AUTOMATED DIFF Collection Time: 10/27/19 10:01 PM  
Result Value Ref Range WBC 4.9 4.1 - 11.1 K/uL  
 RBC 4.40 4. 10 - 5.70 M/uL  
 HGB 13.7 12.1 - 17.0 g/dL HCT 40.7 36.6 - 50.3 % MCV 92.5 80.0 - 99.0 FL  
 MCH 31.1 26.0 - 34.0 PG  
 MCHC 33.7 30.0 - 36.5 g/dL  
 RDW 14.9 (H) 11.5 - 14.5 % PLATELET 60 (L) 783 - 400 K/uL MPV 10.8 8.9 - 12.9 FL  
 NRBC 0.0 0  WBC ABSOLUTE NRBC 0.00 0.00 - 0.01 K/uL NEUTROPHILS 88 (H) 32 - 75 % LYMPHOCYTES 6 (L) 12 - 49 % MONOCYTES 5 5 - 13 % EOSINOPHILS 1 0 - 7 % BASOPHILS 0 0 - 1 % IMMATURE GRANULOCYTES 0 0.0 - 0.5 % ABS. NEUTROPHILS 4.3 1.8 - 8.0 K/UL  
 ABS. LYMPHOCYTES 0.3 (L) 0.8 - 3.5 K/UL  
 ABS. MONOCYTES 0.2 0.0 - 1.0 K/UL  
 ABS. EOSINOPHILS 0.1 0.0 - 0.4 K/UL  
 ABS. BASOPHILS 0.0 0.0 - 0.1 K/UL  
 ABS. IMM. GRANS. 0.0 0.00 - 0.04 K/UL  
 DF SMEAR SCANNED    
 RBC COMMENTS ANISOCYTOSIS 
1+ METABOLIC PANEL, COMPREHENSIVE Collection Time: 10/27/19 10:01 PM  
Result Value Ref Range Sodium 140 136 - 145 mmol/L Potassium 4.2 3.5 - 5.1 mmol/L Chloride 107 97 - 108 mmol/L  
 CO2 27 21 - 32 mmol/L Anion gap 6 5 - 15 mmol/L Glucose 139 (H) 65 - 100 mg/dL BUN 30 (H) 6 - 20 MG/DL Creatinine 1.10 0.70 - 1.30 MG/DL  
 BUN/Creatinine ratio 27 (H) 12 - 20 GFR est AA >60 >60 ml/min/1.73m2 GFR est non-AA >60 >60 ml/min/1.73m2 Calcium 9.3 8.5 - 10.1 MG/DL Bilirubin, total 1.3 (H) 0.2 - 1.0 MG/DL  
 ALT (SGPT) 16 12 - 78 U/L  
 AST (SGOT) 20 15 - 37 U/L Alk. phosphatase 78 45 - 117 U/L Protein, total 7.8 6.4 - 8.2 g/dL Albumin 3.8 3.5 - 5.0 g/dL Globulin 4.0 2.0 - 4.0 g/dL A-G Ratio 1.0 (L) 1.1 - 2.2    
TROPONIN I Collection Time: 10/27/19 10:01 PM  
Result Value Ref Range Troponin-I, Qt. <0.05 <0.05 ng/mL EKG, 12 LEAD, INITIAL Collection Time: 10/27/19 10:10 PM  
Result Value Ref Range Ventricular Rate 91 BPM  
 Atrial Rate 91 BPM  
 P-R Interval 230 ms QRS Duration 110 ms  
 Q-T Interval 400 ms QTC Calculation (Bezet) 492 ms Calculated P Axis 55 degrees Calculated R Axis 39 degrees Calculated T Axis 55 degrees Diagnosis Sinus rhythm with 1st degree AV block Nonspecific T wave abnormality When compared with ECG of 19-JUL-2015 11:53, 
Normal sinus rhythm has replaced Atrial flutter Confirmed by Lola Thomas M.D., Jahaira Forrest ([id number]) on 10/28/2019 9:07:32 AM 
  
URINALYSIS W/ RFLX MICROSCOPIC Collection Time: 10/27/19 11:45 PM  
Result Value Ref Range Color YELLOW/STRAW Appearance CLEAR CLEAR Specific gravity 1.019 1.003 - 1.030    
 pH (UA) 6.0 5.0 - 8.0 Protein NEGATIVE  NEG mg/dL Glucose NEGATIVE  NEG mg/dL Ketone NEGATIVE  NEG mg/dL Bilirubin NEGATIVE  NEG Blood SMALL (A) NEG Urobilinogen 0.2 0.2 - 1.0 EU/dL Nitrites NEGATIVE  NEG Leukocyte Esterase NEGATIVE  NEG    
 WBC 0-4 0 - 4 /hpf  
 RBC 10-20 0 - 5 /hpf Epithelial cells FEW FEW /lpf Bacteria NEGATIVE  NEG /hpf Hyaline cast 0-2 0 - 5 /lpf HEMOGLOBIN A1C WITH EAG Collection Time: 10/28/19  3:05 AM  
Result Value Ref Range Hemoglobin A1c 5.3 4.2 - 6.3 % Est. average glucose 105 mg/dL METABOLIC PANEL, COMPREHENSIVE Collection Time: 10/28/19  3:05 AM  
Result Value Ref Range Sodium 143 136 - 145 mmol/L Potassium 4.2 3.5 - 5.1 mmol/L Chloride 112 (H) 97 - 108 mmol/L  
 CO2 25 21 - 32 mmol/L Anion gap 6 5 - 15 mmol/L Glucose 130 (H) 65 - 100 mg/dL BUN 28 (H) 6 - 20 MG/DL Creatinine 1.09 0.70 - 1.30 MG/DL  
 BUN/Creatinine ratio 26 (H) 12 - 20 GFR est AA >60 >60 ml/min/1.73m2 GFR est non-AA >60 >60 ml/min/1.73m2 Calcium 8.2 (L) 8.5 - 10.1 MG/DL  Bilirubin, total 1.0 0.2 - 1.0 MG/DL  
 ALT (SGPT) 14 12 - 78 U/L  
 AST (SGOT) 17 15 - 37 U/L Alk. phosphatase 58 45 - 117 U/L Protein, total 6.4 6.4 - 8.2 g/dL Albumin 3.1 (L) 3.5 - 5.0 g/dL Globulin 3.3 2.0 - 4.0 g/dL A-G Ratio 0.9 (L) 1.1 - 2.2    
CBC WITH AUTOMATED DIFF Collection Time: 10/28/19  3:05 AM  
Result Value Ref Range WBC 3.9 (L) 4.1 - 11.1 K/uL  
 RBC 3.67 (L) 4.10 - 5.70 M/uL  
 HGB 11.5 (L) 12.1 - 17.0 g/dL HCT 34.6 (L) 36.6 - 50.3 % MCV 94.3 80.0 - 99.0 FL  
 MCH 31.3 26.0 - 34.0 PG  
 MCHC 33.2 30.0 - 36.5 g/dL  
 RDW 15.1 (H) 11.5 - 14.5 % PLATELET 51 (L) 410 - 400 K/uL MPV 10.6 8.9 - 12.9 FL  
 NRBC 0.0 0  WBC ABSOLUTE NRBC 0.00 0.00 - 0.01 K/uL NEUTROPHILS 91 (H) 32 - 75 % LYMPHOCYTES 5 (L) 12 - 49 % MONOCYTES 4 (L) 5 - 13 % EOSINOPHILS 0 0 - 7 % BASOPHILS 0 0 - 1 % IMMATURE GRANULOCYTES 0 0.0 - 0.5 % ABS. NEUTROPHILS 3.5 1.8 - 8.0 K/UL  
 ABS. LYMPHOCYTES 0.2 (L) 0.8 - 3.5 K/UL  
 ABS. MONOCYTES 0.2 0.0 - 1.0 K/UL  
 ABS. EOSINOPHILS 0.0 0.0 - 0.4 K/UL  
 ABS. BASOPHILS 0.0 0.0 - 0.1 K/UL  
 ABS. IMM. GRANS. 0.0 0.00 - 0.04 K/UL  
 DF SMEAR SCANNED    
 RBC COMMENTS ANISOCYTOSIS 
1+ TSH 3RD GENERATION Collection Time: 10/28/19  3:05 AM  
Result Value Ref Range TSH 1.42 0.36 - 3.74 uIU/mL MAGNESIUM Collection Time: 10/28/19  3:05 AM  
Result Value Ref Range Magnesium 1.9 1.6 - 2.4 mg/dL PHOSPHORUS Collection Time: 10/28/19  3:05 AM  
Result Value Ref Range Phosphorus 3.2 2.6 - 4.7 MG/DL  
TROPONIN I Collection Time: 10/28/19  3:05 AM  
Result Value Ref Range Troponin-I, Qt. 0.09 (H) <0.05 ng/mL LIPASE Collection Time: 10/28/19  3:05 AM  
Result Value Ref Range Lipase 95 73 - 393 U/L  
NT-PRO BNP Collection Time: 10/28/19  3:05 AM  
Result Value Ref Range NT pro-BNP 3,863 (H) <450 PG/ML  
TROPONIN I Collection Time: 10/28/19  8:39 AM  
Result Value Ref Range Troponin-I, Qt. 0.11 (H) <0.05 ng/mL INFLUENZA A & B AG (RAPID TEST) Collection Time: 10/28/19  8:57 AM  
Result Value Ref Range Influenza A Antigen NEGATIVE  NEG Influenza B Antigen NEGATIVE  NEG Assessment and Plan: This is a [address] y[address] with ischemic CM here with sepsis and consulted for mildly elevated troponin, likely due to demand ischemia. Will check an additional marker this PM and likely stop checking unless significantly increased Echo pending, EF moderately depressed in the past so agree with IVF limitation Holding BB and ACE/ARB for now, restart when able Cont statin Thank you for this consult, please call with questions Assessment:

## 2019-10-28 NOTE — PROGRESS NOTES
Pharmacist Note - Vancomycin Dosing Consult provided for this 80 y.o. male for indication of sepsis of unknown origin. Antibiotic regimen(s): vanc + zosyn Recent Labs 10/27/19 
2201 WBC 4.9 CREA 1.10 BUN 30* Frequency of BMP: daily x 3 Height: 182.9 cm Weight: 89.8 kg Est CrCl: 49 ml/min; UO: - ml/kg/hr Temp (24hrs), Av.4 °F (38 °C), Min:99 °F (37.2 °C), Max:102.8 °F (39.3 °C) Cultures: 
10/27 paired blood- pending Goal trough = 15 - 20 mcg/mL Therapy will be initiated with a loading dose of 2000 mg IV x 1 to be followed by a maintenance dose of 1500 mg IV every 24 hours. Pharmacy to follow patient daily and order levels / make dose adjustments as appropriate.

## 2019-10-28 NOTE — PROGRESS NOTES
Patient seen and examined. Admitted earlier this AM by my colleague, Dr. Kai Everett. Patient here for severe sepsis secondary to lower extremity cellulitis. Admitted for ICU for hypotension, not yet requiring pressors. Reviewed imaging and no further source for infection identified. Will give additional fluids. However, need to be cautious in setting of prior heart disease and elevated pro-BNP. Order echocardiogram.  
 
Mildly elevated troponin. Will trend, no current chest pain. Suspect demand ischemia. Continue broad spectrum antibiotics. Discussed with nurse and son, Lester Horner.   
 
 
Carlotta Mendez DO

## 2019-10-28 NOTE — PROGRESS NOTES
[facility] Name: Vidya Vazquez MRN: [id number] : 1929 [facility] Date: 10/28/2019     
 
 
80year old male with past medical hx of CAD s/p CABG who presented to [facility] with fever and rigors for few days and couple of episodes of vomiting. He denies abdominal pain of chest pain. Data reviewed - Wbc 3.9 - pmn 91% 
plt 51 
trop 0.11 Cr 1.09 
cxr no infiltrate. Past Medical History:  
Diagnosis Date  Acute MI ([facility] [address]  Cancer ([facility] [address] Skin  Hypertension  Pacemaker 2013 Medtronic Pacer/Defibrillator that is not MRI compatible per [employer]. Past Surgical History:  
Procedure Laterality Date  CARDIAC SURG PROCEDURE UNLIST    
 cabg x 3  
 HX ORTHOPAEDIC    
 shoulder, hand and back surgery  HX PACEMAKER FHx: Father has hypertension. SHx: Former smoker, quit tobacco in 1984. Current Facility-Administered Medications:  
  simvastatin (ZOCOR) tablet 20 mg, 20 mg, Oral, QHS, Anaya Muir MD, 20 mg at 10/28/19 0310 
  gabapentin (NEURONTIN) capsule 300 mg, 300 mg, Oral, TID, Anaya Muir MD, 300 mg at 10/28/19 1169   sodium chloride (NS) flush 5-40 mL, 5-40 mL, IntraVENous, Q8H, Anaya Muir MD, 10 mL at 10/28/19 3075   sodium chloride (NS) flush 5-40 mL, 5-40 mL, IntraVENous, PRN, Anaya Muir MD 
  acetaminophen (TYLENOL) tablet 650 mg, 650 mg, Oral, Q4H PRN, Anaya Muir MD, 650 mg at 10/28/19 1211 
  ondansetron (ZOFRAN) injection 4 mg, 4 mg, IntraVENous, Q4H PRN, Anaya Muir MD 
  bisacodyl (DULCOLAX) tablet 5 mg, 5 mg, Oral, DAILY PRN, Anaya Muir MD 
  piperacillin-tazobactam (ZOSYN) 3.375 g in 0.9% sodium chloride (MBP/ADV) 100 mL, 3.375 g, IntraVENous, Q8H, Anaya Muir MD, Last Rate: 25 mL/hr at 10/28/19 1105, 3.375 g at 10/28/19 1105   lactobac ac& pc-s.therm-b.anim (DESIREE Q/RISAQUAD), 1 Cap, Oral, DAILY, Keya Gastelum MD, 1 Cap at 10/28/19 7175   PHENYLephrine (CLARISSA-SYNEPHRINE) 30 mg in 0.9% sodium chloride 250 mL infusion,  mcg/min, IntraVENous, TITRATE, Anaya Muir MD, Stopped at 10/28/19 0300 
  lactated Ringers infusion, 100 mL/hr, IntraVENous, CONTINUOUS, Anaya Muir MD, Last Rate: 100 mL/hr at 10/28/19 1104, 100 mL/hr at 10/28/19 1104   Vancomycin- Pharmacy to Dose, , Other, Rx Dosing/Monitoring, Anaya Muir MD 
  [START ON 10/29/2019] vancomycin (VANCOCIN) 1500 mg in  ml infusion, 1,500 mg, IntraVENous, Q24H, Anaya Muir MD 
 
  
 
 
IMPRESSION:  
-LLE cellulitis. -sepsis. -Hypotension. 
-elevated troponin from demand ischemia. 
-Chronic thrombocytopenia. -CAD s/p CABG  
  
RECOMMENDATIONS:please see orders for details. Case d/w nursing and on Multi D rounds. -O2 supplementation to keep sats above 92%. 
-iv abx - vanco/zosyn. 
-Iv fluids - RL @ 100. 
-wound cultures and blood cultures. -echo. Will watch in the icu for now. I have personally reviewed the radiology films and reports. Subjective/Interval History:  
I have reviewed the flowsheet and previous days notes. Objective:  
 
Vital Signs:   
Visit Vitals BP (!) 85/56 Pulse 70 Temp 98.6 °F (37 °C) Resp 22 Ht 6' (1.829 m) Wt 91.3 kg (201 lb 4.5 oz) SpO2 92% BMI 27.30 kg/m² TMAX(24) Intake/Output:  
Last shift:        
Last 3 shifts: 10/28 0701 - 10/28 1900 In: 1200 [I.V.:1200] Out: 100 [Urine:100]RRIOLAST3 Intake/Output Summary (Last 24 hours) at 10/28/2019 1332 Last data filed at 10/28/2019 1300 Gross per 24 hour Intake 3316.67 ml Output 250 ml Net 3066.67 ml EXAM  
 
 
exam  Other  
general Ill appearing/somnolent/ appears stated age HEENT:  Op moist no ulcers, JVD not elevated, no cervical LAD Chest No pectus deformity, normal chest rise b/l HEART:  RRR no m/r/g no rubs Lungs:  CTA b/l no r/r/w, diminished BS at bases ABD Soft/NT non rigid mildly distended, hypoactive BS   
EXT No c/c/e normal peripheral pulses Skin No rashes or ulcers, no mottling Neuro awake Data I have personally reviewed data, flowsheets for the last 24 hours. Labs: 
Recent Labs 10/28/19 
0305 10/27/19 
2201 WBC 3.9* 4.9 HGB 11.5* 13.7 HCT 34.6* 40.7 PLT 51* 60* Recent Labs 10/28/19 
0305 10/27/19 
2201  140  
K 4.2 4.2 * 107 CO2 25 27 * 139* BUN 28* 30* CREA 1.09 1.10 CA 8.2* 9.3 MG 1.9  --   
PHOS 3.2  --   
ALB 3.1* 3.8 SGOT 17 20 ALT 14 16 ABG No results for input(s): PHI, PO2I, PCO2I in the last 72 hours. Nadine Smith MD 
[facility]

## 2019-10-28 NOTE — PROGRESS NOTES
0205: TRANSFER - IN REPORT: 
 
Verbal report received from [address] RN(name) on Leann Roger  being received from ED(unit) for routine progression of care Report consisted of patients Situation, Background, Assessment and  
Recommendations(SBAR). Information from the following report(s) SBAR, Kardex, ED Summary, Procedure Summary, Intake/Output, MAR, Recent Results, Cardiac Rhythm Paced and Alarm Parameters  was reviewed with the receiving nurse. Opportunity for questions and clarification was provided. Assessment completed upon patients arrival to unit and care assumed. 7988: Pt arrived to ICU. Pt is alert and oriented to self and location, disoriented to time and situation. No complaints of pain. MAP>65 off [address]. Pt's son reports that the family has started to notice the pt has had difficulty with memory, pt's baseline is occasional confusion. Primary Nurse Zoya Robb, VERONICA and VERONICA Marks performed a dual skin assessment on this patient Impairment noted- see wound doc flow sheet Kadeem score is 15. 
 
0445: Paged Dr. Curtis Julien to report Troponin @ 0.09. Received orders for routine cardiology consult in AM. Bedside and Verbal shift change report given to Humaira Drummond RN (oncoming nurse) by Cathy Lawler RN (offgoing nurse). Report included the following information SBAR, Kardex, ED Summary, Intake/Output, MAR, Recent Results, Cardiac Rhythm Paced and Alarm Parameters .

## 2019-10-28 NOTE — PROGRESS NOTES
Day #1 of Zosyn Indication:  sepsis Current regimen:  3.375 g IV q 6 h Abx regimen: vanc + zosyn Recent Labs 10/27/19 
2201 WBC 4.9 CREA 1.10 BUN 30* Est CrCl: 49 ml/min; UO: - ml/kg/hr Temp (24hrs), Av.4 °F (38 °C), Min:99 °F (37.2 °C), Max:102.8 °F (39.3 °C) Cultures: 10/27 blood- pending Plan: Change to 3.375 g IV a 8 h per [facility] extended infusion policy.

## 2019-10-28 NOTE — PROGRESS NOTES
Transitions of care TBD pending medical progress Reason for Admission: RRAT Score: Do you (patient/family) have any concerns for transition/discharge? Plan for utilizing home health:    
 
Current Advanced Directive/Advance Care Plan: On file [address],[place] and Dinora Colón share MPOA, Patient is Full Code Care manager met with patient and his son Dinora Colón to introduce self and explain role. Patient lives with his son Mario Santos , his wife and son. Per patient he uses a cane and has no history of home health. Confirmed his PCP to be Dr Delisa Raines and was last seen 3 weeks ago and uses the [facility] as his pharmacy. Patient was driving prior to admission and per son the plan is to take his car as he is no longer safe to drive. Family is looking into having care in the home once patient is discharged and have contacted [address] [place] [address]. Care management will follow for transitions of care. Georgia Knox RN,CRM Care Management Interventions PCP Verified by CM: Yes(Dr Bautista) Palliative Care Criteria Met (RRAT>21 & CHF Dx)?: No 
MyChart Signup: No 
Discharge Durable Medical Equipment: No 
Physical Therapy Consult: No 
Occupational Therapy Consult: No 
Speech Therapy Consult: No 
Current Support Network: Relative's Home(Sons Dinora Colón [phone], [facility] [phone]) Confirm Follow Up Transport: Family

## 2019-10-28 NOTE — ED NOTES
Verbal report given to RN (name) on Carlos Alberto Colon being transferred to ICU - 4 (unit) for routine progression of care Report consisted of patient's Situation, Background, Assessment and Recommendations (SBAR) Information from the following report(s)  ED Summary was reviewed with the receiving nurse. Opportunity for questions and clarification was provided. Patient transported with:  Monitor Registered Nurse Last Filed Values: 
Temp: 99 °F (37.2 °C) (10/28/19 0150) Pulse (Heart Rate): 73 (10/28/19 0150) Resp Rate: 15 (10/28/19 0150) O2 Sat (%): 96 % (10/28/19 0150) BP: 91/56 (10/28/19 0150) MAP (Monitor): (!) 64 (10/28/19 0150) MAP (Calculated): 83 (10/27/19 2138) Level of Consciousness: Alert (10/28/19 0150) Lab Results Component Value Date/Time WBC 4.9 10/27/2019 10:01 PM  
 
 
Repeat LA:  Time Due NA Blood Cultures Drawn:  yes Fluid Resuscitation:  Total needed 2694, Status infusing All Antibiotics Started:  yes, Dose Due NA 
 
VS x 2 post-fluid resuscitation:   no 
 
Vasopressor Infusion:  no  
 
Provider Reassessment needed and notified:  no , Due NA Additional Interventions/Comments:  NA

## 2019-10-28 NOTE — ED TRIAGE NOTES
Patient arrived via EMS from home reporting \"not feeling well\". Patient is unable to describe what he means by not feeling well. EMS reports patient vomiting en route and patient had an episode of vomiting at home. Patient reports a full feeling on his stomach. Patient reports chills and feeling of full stomach since about 1700.   Patient's temperature in triage is 102.8 oral.

## 2019-10-28 NOTE — WOUND CARE
WOCN Note:  
 
New consult placed for assessment of left leg. Chart reviewed. Admitted DX:  Sepsis Past Medical History: htn, dyslipidemia, CAD with CABG, pacer, CHF,thrombocytopenia. Assessment:  
Patient is alert, communicative and requires assist with repositioning. Bed: sport Patient reports no pain. Heels intact without erythema and offloaded on pillows. Sacrum and buttocks intact with blanching pink erythema. 1. POA Left low leg, 2.2 x 0.5 x 0 cm dry maroon scab. Left open to air. 2. POA Left low leg, 1 x 1 x 0 cm pink raised area with white center. Left open to air. Recommendations: 1. Left low leg:  Daily cleanse and leave open to air. Skin Care & Pressure Prevention: 
Minimize layers of linen/pads under patient to optimize support surface. Turn/reposition approximately every 2 hours and offload heels. Manage incontinence / promote continence Discussed above plan with patient and Familia MARTIN. Transition of Care: Plan to follow as needed while admitted to hospital. 
 
HUGO WilcoxN RN [facility] Wound Care Office [phone] Pager [phone]

## 2019-10-28 NOTE — ED PROVIDER NOTES
HPI  
 
80-year-old male with a history of skin cancer, hypertension, pacemaker, MI/congestive heart failure, presents to the ED with fever since this afternoon. He was in his usual state of health up until this afternoon. Out today with his son who lives with. Tonight he started with chills and fever. He reported some shortness of breath prior to EMS arrival.  He denies a headache or congestion. He has a slight sore throat after vomiting. He denies chest pain cough congestion. He denies abdominal pain. He did have some vomiting tonight but no diarrhea. He denies any urinary symptoms. Family did note a lesion to his left lower leg with some redness that they noted since they have been in the ER. He has not had any Tylenol prior to arrival.  There is no one sick at home. Past Medical History:  
Diagnosis Date  Acute MI ([facility] [address]  Cancer ([facility] [address] Skin  Hypertension  Pacemaker 2013 Medtronic Pacer/Defibrillator that is not MRI compatible per [employer]. Past Surgical History:  
Procedure Laterality Date  CARDIAC SURG PROCEDURE UNLIST    
 cabg x 3  
 HX ORTHOPAEDIC    
 shoulder, hand and back surgery  HX PACEMAKER History reviewed. No pertinent family history. Social History Socioeconomic History  Marital status:  Spouse name: Not on file  Number of children: Not on file  Years of education: Not on file  Highest education level: Not on file Occupational History  Not on file Social Needs  Financial resource strain: Not on file  Food insecurity:  
  Worry: Not on file Inability: Not on file  Transportation needs:  
  Medical: Not on file Non-medical: Not on file Tobacco Use  Smoking status: Former Smoker Packs/day: 0.50 Years: 40.00 Pack years: 20.00 Last attempt to quit: 1984 Years since quittin.9  Smokeless tobacco: Never Used Substance and Sexual Activity  Alcohol use: Yes Alcohol/week: 5.8 standard drinks Types: 7 Glasses of wine per week  Drug use: No  
 Sexual activity: Not on file Lifestyle  Physical activity:  
  Days per week: Not on file Minutes per session: Not on file  Stress: Not on file Relationships  Social connections:  
  Talks on phone: Not on file Gets together: Not on file Attends Orthodox service: Not on file Active member of club or organization: Not on file Attends meetings of clubs or organizations: Not on file Relationship status: Not on file  Intimate partner violence:  
  Fear of current or ex partner: Not on file Emotionally abused: Not on file Physically abused: Not on file Forced sexual activity: Not on file Other Topics Concern  Not on file Social History Narrative  Not on file ALLERGIES: Prednisone Review of Systems Constitutional: Positive for fever. HENT: Negative for congestion. Eyes: Negative for visual disturbance. Respiratory: Positive for shortness of breath. Negative for cough. Cardiovascular: Positive for leg swelling (baseline). Negative for chest pain and palpitations. Gastrointestinal: Positive for vomiting. Negative for abdominal pain and diarrhea. Genitourinary: Negative for dysuria. Musculoskeletal: Negative for gait problem. Skin: Positive for rash. Neurological: Negative for headaches. Psychiatric/Behavioral: Negative for dysphoric mood. Vitals:  
 10/27/19 2138 BP: 116/67 Pulse: 85 Resp: 15 Temp: (!) 102.8 °F (39.3 °C) SpO2: 95% Weight: 89.8 kg (198 lb) Height: 6' (1.829 m) Physical Exam  
Constitutional: He is oriented to person, place, and time. He appears well-developed and well-nourished. No distress. HENT:  
Head: Normocephalic and atraumatic. Mouth/Throat: No oropharyngeal exudate. Eyes: Pupils are equal, round, and reactive to light.  Right eye exhibits no discharge. Left eye exhibits no discharge. No scleral icterus. Neck: Normal range of motion. Neck supple. No JVD present. Cardiovascular: Normal rate and regular rhythm. Murmur heard. Pulmonary/Chest: Effort normal and breath sounds normal. No stridor. No respiratory distress. He has no wheezes. He has no rales. He exhibits no tenderness. Abdominal: Soft. Bowel sounds are normal. He exhibits no distension and no mass. There is no tenderness. There is no rebound and no guarding. Musculoskeletal: Normal range of motion. He exhibits edema. Neurological: He is oriented to person, place, and time. Skin: Skin is warm and dry. Capillary refill takes less than 2 seconds. No rash noted. There is erythema. Erythema warmth to left lower leg with abrasion and circular pink raised lesion Psychiatric: He has a normal mood and affect. His behavior is normal. Judgment and thought content normal.  
  
 
MDM Procedures ED EKG interpretation: 
Rhythm: normal sinus rhythm; and regular . Rate (approx.): 91; Axis: normal; P wave: prolonged; QRS interval: normal ; ST/T wave: Ruben Rein This EKG was interpreted by Joya Black MD,ED Provider. Labs ok, chronic thrombocytompenia, lactate ok. cxr clear. Urine pending. Zosyn given for cellulitis. Will admit,. Hospitalist Chikis for Admission 11:54 PM 
 
ED Room Number: ER07/07 Patient Name and age:  Glenys Mckinnon 80 y.o.  male Working Diagnosis: 1. Fever, unspecified fever cause 2. Cellulitis of left lower extremity Readmission: no 
Isolation Requirements:  no 
Recommended Level of Care:  med/surg Code Status:  Full Code Department:[facility] Adult ED - [phone] Other:  80year old male with lle cellulitis and 102 fever. Lactate ok. CBC ok (chronic low plateletes) urine only thing pending.

## 2019-10-28 NOTE — H&P
[address] HISTORY AND PHYSICAL Name:  Elizabet Adame 
MR#:  [id number] :  [id number] ACCOUNT #:  [de-identified] ADMIT DATE:  10/27/2019 The patient was seen, evaluated, and admitted by me on 10/28/2019. PRIMARY CARE PHYSICIAN:  Lilia Hand MD 
 
SOURCE OF INFORMATION:  The patient, who is not a good historian, the patient's son, who was present at the bedside. CHIEF COMPLAINT:  Malaise. HISTORY OF PRESENT ILLNESS:  This is a 80-year-old man with a past medical history significant for hypertension, dyslipidemia, chronic thrombocytopenia, coronary artery disease status post CABG, status post pacemaker insertion, and congestive heart failure, who was in his usual state of health until the day of presentation at the emergency room when the patient developed malaise. The patient stated that he was not just feeling well. The patient has associated fever, rigors, and chills, vomited twice before coming to the emergency room. The patient also reported sore throat, but denies chest pain or cough. The vomiting was not associated with abdominal pain and no diarrhea. The patient has had skin cancer removed from the left leg, but the family noticed increased swelling and redness of the left leg and a small lesion in the left leg, which was new. No sick contacts. He was brought to the emergency room for further evaluation. The patient was last admitted to the hospital from 2015 to 2015. The patient was admitted and treated for lower GI bleed and fecal impaction. When the patient arrived at the emergency room, clinical symptoms and presentation eventually triggered code sepsis. Code sepsis was called. The patient was treated in the emergency room as per code sepsis protocol, which included fluid therapy and administration of antibiotics. The patient was referred to the hospitalist service for admission. PAST MEDICAL HISTORY:  Hypertension, dyslipidemia, chronic thrombocytopenia, coronary artery disease status post CABG, status post pacemaker insertion, and congestive heart failure. ALLERGIES:  THE PATIENT IS ALLERGIC TO PREDNISONE. 
 
MEDICATIONS: 
1. Coreg 3.125 mg twice daily. 2.  Lasix 20 mg daily. 3.  Neurontin 400 mg three times daily. 4.  Toprol XL 25 mg half tablet daily. 5.  Zocor 20 mg daily. FAMILY HISTORY:  This was reviewed. His father had hypertension. PAST SURGICAL HISTORY:  This is significant for CABG, back surgery, shoulder and hand surgery, pacemaker insertion. SOCIAL HISTORY:  The patient is a former smoker, quit tobacco abuse in 11/1984. The patient admits to social consumption of alcohol, about seven glasses of wine per week. REVIEW OF SYSTEMS: 
HEAD, EYES, EARS, NOSE, AND THROAT:  No headache, no dizziness, no blurring of vision, no photophobia. RESPIRATORY SYSTEM:  This is positive for shortness of breath. No cough, no hemoptysis. CARDIOVASCULAR SYSTEM:  No chest pain, no orthopnea, no palpitation. GASTROINTESTINAL SYSTEM:  This is positive for vomiting. No diarrhea. No constipation. No abdominal pain. GENITOURINARY SYSTEM:  No dysuria, no urgency, and no frequency. All other systems are reviewed and they are negative. PHYSICAL EXAMINATION: 
GENERAL APPEARANCE:  The patient appeared ill in moderate distress. VITAL SIGNS:  On arrival at the emergency room, temperature 102.8, pulse 85, respiratory rate 15, blood pressure 116/67, oxygen saturation 95% on room air. HEENT:  Head:  Normocephalic, atraumatic. Eyes:  Normal eye movements. No redness, no drainage, no discharge. Ears:  Normal external ears with no evidence of drainage. Nose:  No deformity and no drainage. Mouth and Throat:  No visible oral lesion. Dry oral mucosa. NECK:  Neck is supple. No JVD, no thyromegaly. CHEST:  Clear breath sounds. No wheezing, no crackles. HEART:  Normal S1 and S2, regular. No clinically appreciable murmur. ABDOMEN:  Soft, nontender. Normal bowel sounds. CNS:  Alert, oriented x3. No gross focal neurological deficit. EXTREMITIES:  Edema 2+. Pulses 2+ bilaterally. MUSCULOSKELETAL SYSTEM:  No evidence of joint deformity or swelling. Skin:  Bilateral lower extremity venous stasis, redness and swelling of the left leg with skin lesion noted and present on admission. PSYCHIATRY:  Normal mood and affect. LYMPHATIC SYSTEM:  No cervical lymphadenopathy. DIAGNOSTIC DATA:  Chest x-ray, no acute process. EKG shows sinus rhythm and nonspecific T-wave abnormalities. LABORATORY DATA:  Lactic acid level 1.56. Cardiac Profile:  Troponin less than 0.05. Chemistry:  Sodium 140, potassium 4.2, chloride 107, CO2 27, glucose 139, BUN 30, creatinine 1.10, calcium 9.3, total bilirubin 1.3, ALT 16, AST 20, alkaline phosphatase 78, total protein at 7.8, albumin level 3.8, globulin at 4.0. Hematology:  WBC 4.9, hemoglobin at 13.7, hematocrit 40.7, platelets 60. Urinalysis: This is significant for negative nitrite, negative leukocyte esterase, negative bacteria. ASSESSMENT: 
1. Cellulitis, left lower extremity. 2.  Hypertension. 3.  Dyslipidemia. 4.  Chronic thrombocytopenia. 5.  Coronary artery disease status post coronary artery bypass graft. 6.  Sepsis. 7.  Status post pacemaker insertion. 8.  Hyperglycemia. PLAN: 
1. Cellulitis, left lower extremity. We will admit the patient for further evaluation and treatment. The patient has bilateral lower extremity venous stasis. The patient has cellulitis of the left lower extremity on top of the venous stasis. This is most likely source of the patient's sepsis, which will be discussed below. The patient will be started on antibiotics. We will await results of the ultrasound of the left lower extremity for DVT, ordered by the emergency room physician. 2.  Hypertension. The patient's blood pressure was borderline in the emergency room and subsequently became hypotensive due to the sepsis. We will hold antihypertensive medication. We will also hold Lasix. We will continue fluid therapy as per sepsis protocol. The patient is being admitted into the intensive care unit, may require vasopressor, if there is no improvement in his hypotension with fluid therapy. We will check BNP level. We will check cardiac markers. 3.  Dyslipidemia. We will resume preadmission medication. 4.  Chronic thrombocytopenia. The patient is asymptomatic. We will continue to monitor. 5.  Coronary artery disease, status post CABG. We will check cardiac markers as stated above. Cardiac medication on hold, because of the hypotension. 6.  Sepsis. We will admit the patient for further evaluation and treatment. We will start the patient on vancomycin and Zosyn. The sepsis is most likely coming from the cellulitis of the left lower extremity as stated above, but we will check CT scan of the chest to evaluate the patient for pneumonia as a possible source of sepsis. We will also obtain a CT scan of the abdomen and pelvis to evaluate the patient for diverticulitis or colitis as a possible source of sepsis. We will continue with fluid therapy. 7.  Status post pacemaker insertion. Continue to monitor. 8.  Hyperglycemia. The patient has no history of diabetes. We will check hemoglobin A1c level. 9. Other Issues:  Code Status: The patient is a full code. We will request SCD for DVT prophylaxis. SEPSIS REASSESSMENT COMPLETED. The patient has normal capillary refill, improved cardiopulmonary examination, and moist mucosal membrane. FUNCTIONAL STATUS PRIOR TO ADMISSION:  The patient is ambulatory with a cane. Tamela Garcia MD 
 
 
[id number] 
D:  10/28/2019 5:38 T:  10/28/2019 7:33 
JOB #:  [id number] CC:  Daniel Rouse MD

## 2019-10-29 NOTE — PROGRESS NOTES
2000: Assumed care of the pt. Pt resting in bed, oriented to self and location, disoriented to time and situation. VSS. Shift summary: MAP > 65 entire shift, remains off vasopressors. Pt occasionally complains of lower back pain relieved with Tylenol. Otherwise uneventful shift. Bedside and Verbal shift change report given to Nini Stoddard RN (oncoming nurse) by Saad Pratt RN (offgoing nurse). Report included the following information SBAR, Kardex, Procedure Summary, Intake/Output, MAR, Recent Results, Med Rec Status, Cardiac Rhythm Paced and Alarm Parameters .

## 2019-10-29 NOTE — PROGRESS NOTES
Admission Medication Reconciliation: 
 
Information obtained from:  Patient's Son, Chart Review, Rx Claims History, [facility], Pharmacy ([facility] - [phone]), PCP's Office (Dr. Jose Luis Tariq, [phone]) RxQuery data available¹:  YES Comments/Recommendations: Updated PTA meds/reviewed patient's allergies. 1)  Was approached by VERONICA Moore on 7S ICU who requested that I review Mr. Shyanne Fregoso PTA meds because his son was present and a had a 'medication list' on his phone. Said medication list was in a manual note format which contained medication names, some of which had doses and/or frequencies and/or indications. The son was unable to speak to the many discrepancies between his list, standing PTA medication record, and Rx Claims' history. Therefore, I obtained/confirmed patient's preferred pharmacy and current PCP (see above). On speaking with both the pharmacy and VERONICA Stevenson at PCP's office, the PTA list was updated to the best of my ability as described below. I am of minimal confidence the PTA list is comprehensive or accurate in its entirety and it's my recommendation that it should be utilized accordingly. 2)  Medication changes (since last review): Added 
- enalapril 5 mg po daily (per PCP this was e-prescribed on 6/24/19. Pharmacy states that the medication was never picked up) 
- metoprolol succinate 12.5 mg po daily (per son, patient was instructed to stop taking by dermatologist. Per PCP office, this medication is current) Adjusted 
- gabapentin (old regimen 400 mg po tid / new regimen 300 mg po tid) Removed - coreg 3) Son's list has \"hydrocodone as needed\" - this could not be confirmed as [place] Prescription Monitoring report could not find patient in system. This is interesting because RxQuery and pharmacy confirm that patient picked up gabapentin in September which should appear on .  I did not add hydrocodone to list as the dose, duration, etc. could not be reconciled between pharmacy or PCP ¹RxQuery pharmacy benefit data reflects medications filled and processed through the patient's insurance, however  
this data does NOT capture whether the medication was picked up or is currently being taken by the patient. Allergies:  Prednisone Significant PMH/Disease States:  
Past Medical History:  
Diagnosis Date Acute MI ([facility] [address] Cancer ([facility] [address] Skin Hypertension Pacemaker 06/18/2013 [employer] Pacer/Defibrillator that is not MRI compatible per [employer]. Chief Complaint for this Admission: Chief Complaint Patient presents with Vomiting Chills Prior to Admission Medications:  
Prior to Admission Medications Prescriptions Last Dose Informant Patient Reported? Taking?  
enalapril (VASOTEC) 5 mg tablet Not Taking at Unknown time  Yes No  
Sig: Take 5 mg by mouth daily. finasteride (PROSCAR) 5 mg tablet   Yes Yes Sig: Take 5 mg by mouth daily. furosemide (LASIX) 20 mg tablet 10/22/2019 at Unknown time  Yes Yes Sig: Take 40 mg by mouth daily. gabapentin (NEURONTIN) 300 mg capsule   Yes Yes Sig: Take 300 mg by mouth three (3) times daily. metoprolol succinate (TOPROL-XL) 25 mg XL tablet Not Taking at Unknown time  Yes No  
Sig: Take 12.5 mg by mouth daily. simvastatin (ZOCOR) 20 mg tablet 10/22/2019 at Unknown time  Yes Yes Sig: Take 20 mg by mouth nightly. tamsulosin (FLOMAX) 0.4 mg capsule   Yes Yes Sig: Take 0.4 mg by mouth daily. Facility-Administered Medications: None Please contact the main inpatient pharmacy with any questions or concerns at [phone] and we will direct you to the clinical pharmacist covering this patient's care while in-house. Thank you, Rere Dickerson, CHEIKHD

## 2019-10-29 NOTE — PROGRESS NOTES
Cardiology Progress Note 
10/29/2019     Admit Date: 10/27/2019 Admit Diagnosis: Sepsis ([facility] [A41.9]  CC: none currently Assessment:  
Principal Problem: 
  Sepsis ([facility] (10/28/2019) Plan: No more troponin checks Echo showed consistent LVEF from before BP better, hopeful to restart home cardiac meds No invasive cardiac plans Subjective:   
 
Carlos Alberto Colon has had clinical improvement, may be able to move out of ICU Objective:  
 Physical Exam: 
Overall VSSAF;   
Visit Vitals /84 Pulse 70 Temp 97.9 °F (36.6 °C) Resp 18 Ht 6' (1.829 m) Wt 96.2 kg (212 lb 1.3 oz) SpO2 99% BMI 28.76 kg/m² Temp (24hrs), Av.2 °F (36.8 °C), Min:97.9 °F (36.6 °C), Max:98.6 °F (37 °C) Patient Vitals for the past 8 hrs: 
 Pulse 10/29/19 0900 70  
10/29/19 0800 70  
10/29/19 0700 72  
10/29/19 0600 70  
10/29/19 0500 70  
10/29/19 0400 70  
10/29/19 0300 70 Patient Vitals for the past 8 hrs: 
 Resp  
10/29/19 0800 18  
10/29/19 0700 16  
10/29/19 0600 20  
10/29/19 0500 21  
10/29/19 0400 22  
10/29/19 0300 22 Patient Vitals for the past 8 hrs: 
 BP  
10/29/19 0900 126/84  
10/29/19 0800 121/77  
10/29/19 0700 104/73  
10/29/19 0600 112/78  
10/29/19 0500 115/72  
10/29/19 0400 109/64  
10/29/19 0300 97/67  
  
10/27 1901 - 10/29 0700 In: 5816.7 [I.V.:5816.7] Out: 400 [Urine:400] General Appearance: Well developed, well nourished, no acute distress. Ears/Nose/Mouth/Throat:   Normal MM; anicteric. JVP: WNL Resp:   Lungs clear to auscultation bilaterally. Nl resp effort. Cardiovascular:  RRR, S1, S2 normal, no new murmur. No gallop or rub. Abdomen:   Soft, non-tender, bowel sounds are present. Extremities: No edema bilaterally. Skin: 
Neuro: Warm and dry. A/O x3, grossly nonfocal  
                     
Data Review:    
Telemetry independently reviewed :   normal sinus rhythm Labs:  
Recent Results (from the past 24 hour(s)) ECHO ADULT COMPLETE  
 Collection Time: 10/28/19  2:00 PM  
Result Value Ref Range LA Volume 165.55 18 - 58 mL Ao Root D 3.47 cm Aortic Valve Systolic Peak Velocity 726.44 cm/s Aortic Valve Area by Continuity of Peak Velocity 2.2 cm2 AoV PG 6.5 mmHg LVIDd 5.96 (A) 4.2 - 5.9 cm  
 LVPWd 1.00 0.6 - 1.0 cm LVIDs 5.62 cm IVSd 1.03 (A) 0.6 - 1.0 cm  
 LVOT d 2.06 cm  
 LVOT Peak Velocity 82.97 cm/s LVOT Peak Gradient 2.8 mmHg LVOT VTI 14.74 cm  
 MVA (PHT) 4.9 cm2  
 MV A Gera 85.73 cm/s  
 MV E Gera 62.49 cm/s  
 MV E/A 0.73 Left Atrium to Aortic Root Ratio 1.89 LA Vol 4C 124.71 (A) 18 - 58 mL  
 LA Vol 2C 188.69 (A) 18 - 58 mL  
 LA Area 4C 33.9 cm2 LV Mass .6 (A) 88 - 224 g LV Mass AL Index 138.9 49 - 115 g/m2 Mitral Valve E Wave Deceleration Time 155.4 ms Mitral Valve Pressure Half-time 45.1 ms Left Atrium Major Axis 6.56 cm Triscuspid Valve Regurgitation Peak Gradient 22.1 mmHg Pulmonic Valve Max Velocity 61.78 cm/s LVOT SV 48.9 ml  
 TR Max Velocity 234.97 cm/s  
 LA Vol Index 77.54 16 - 28 ml/m2 LA Vol Index 88.38 16 - 28 ml/m2 LA Vol Index 58.41 16 - 28 ml/m2 Left Ventricular Fractional Shortening by 2D [id number] % Left Ventricular Outflow Tract Mean Gradient 1.3575609490073 mmHg Left Ventricular Outflow Tract Mean Velocity 8.0981350723288 cm/s Mitral Valve Deceleration Placer 2.5054112066936 AV Velocity Ratio 0.65 PV peak gradient 1.5 mmHg TROPONIN I Collection Time: 10/28/19  7:33 PM  
Result Value Ref Range Troponin-I, Qt. 0.10 (H) <0.05 ng/mL METABOLIC PANEL, BASIC Collection Time: 10/29/19  4:11 AM  
Result Value Ref Range Sodium 142 136 - 145 mmol/L Potassium 3.6 3.5 - 5.1 mmol/L Chloride 113 (H) 97 - 108 mmol/L  
 CO2 26 21 - 32 mmol/L Anion gap 3 (L) 5 - 15 mmol/L Glucose 86 65 - 100 mg/dL BUN 23 (H) 6 - 20 MG/DL  Creatinine 1.08 0.70 - 1.30 MG/DL  
 BUN/Creatinine ratio 21 (H) 12 - 20    
 GFR est AA >60 >60 ml/min/1.73m2 GFR est non-AA >60 >60 ml/min/1.73m2 Calcium 8.4 (L) 8.5 - 10.1 MG/DL Current medications reviewed Jaimie Dexter MD

## 2019-10-29 NOTE — PROGRESS NOTES
TRANSFER - OUT REPORT: 
 
Verbal report given to lake(name) on Raven Leader  being transferred to [facility](unit) for routine progression of care Report consisted of patients Situation, Background, Assessment and  
Recommendations(SBAR). Information from the following report(s) SBAR, Kardex and MAR was reviewed with the receiving nurse. Lines:  
Peripheral IV 10/27/19 Left Forearm (Active) Site Assessment Clean, dry, & intact 10/29/2019  8:00 AM  
Phlebitis Assessment 0 10/29/2019  8:00 AM  
Infiltration Assessment 0 10/29/2019  8:00 AM  
Dressing Status Clean, dry, & intact 10/29/2019  8:00 AM  
Dressing Type Transparent 10/29/2019  8:00 AM  
Hub Color/Line Status Pink; Infusing 10/29/2019  8:00 AM  
Action Taken Open ports on tubing capped 10/29/2019  8:00 AM  
Alcohol Cap Used No 10/29/2019  8:00 AM  
   
Peripheral IV 10/27/19 Right Forearm (Active) Site Assessment Clean, dry, & intact 10/29/2019  8:00 AM  
Phlebitis Assessment 0 10/29/2019  8:00 AM  
Infiltration Assessment 0 10/29/2019  8:00 AM  
Dressing Status Clean, dry, & intact 10/29/2019  8:00 AM  
Dressing Type Transparent 10/29/2019  8:00 AM  
Hub Color/Line Status Pink; Infusing 10/29/2019  8:00 AM  
Action Taken Open ports on tubing capped 10/29/2019  8:00 AM  
Alcohol Cap Used Yes 10/29/2019  8:00 AM  
  
 
Opportunity for questions and clarification was provided. Patient transported with: 
 Registered Nurse Tech

## 2019-10-29 NOTE — PROGRESS NOTES
[facility] Pulmonary, Critical Care, and Sleep Medicine Name: Danielle Castañeda MRN: [id number] : 1929 Hospital: [address]  Date: 10/29/2019     
 
 
10/29: 
No chest pain, cough, sputum, fever, chills. 
 
===================== 
 
80year old male with past medical hx of CAD s/p CABG who presented to [facility] with fever and rigors for few days and couple of episodes of vomiting. He denies abdominal pain of chest pain. Data reviewed - Wbc 3.9 - pmn 91% 
plt 51 
trop 0.11 Cr 1.09 
cxr no infiltrate. Past Medical History:  
Diagnosis Date  Acute MI ([facility]  Cancer ([facility] Skin  Hypertension  Pacemaker 2013 Med[employer]onic Pacer/Defibrillator that is not MRI compatible per [employer]. Past Surgical History:  
Procedure Laterality Date  CARDIAC SURG PROCEDURE UNLIST    
 cabg x 3  
 HX ORTHOPAEDIC    
 shoulder, hand and back surgery  HX PACEMAKER FHx: Father has hypertension. SHx: Former smoker, quit tobacco in 1984. Current Facility-Administered Medications:  
  simvastatin (ZOCOR) tablet 20 mg, 20 mg, Oral, QHS, Anaya Muir MD, 20 mg at 10/28/19 2142 
  gabapentin (NEURONTIN) capsule 300 mg, 300 mg, Oral, TID, Anaya Muir MD, 300 mg at 10/29/19 0841 
  sodium chloride (NS) flush 5-40 mL, 5-40 mL, IntraVENous, Q8H, Anaya Muir MD, 10 mL at 10/29/19 0602   sodium chloride (NS) flush 5-40 mL, 5-40 mL, IntraVENous, PRN, Anaya Muir MD 
  acetaminophen (TYLENOL) tablet 650 mg, 650 mg, Oral, Q4H PRN, Anaya Muir MD, 650 mg at 10/29/19 0913   ondansetron (ZOFRAN) injection 4 mg, 4 mg, IntraVENous, Q4H PRN, Anaya Muir MD 
  bisacodyl (DULCOLAX) tablet 5 mg, 5 mg, Oral, DAILY PRN, Anaya Muir MD 
  piperacillin-tazobactam (ZOSYN) 3.375 g in 0.9% sodium chloride (MBP/ADV) 100 mL, 3.375 g, IntraVENous, Q8H, Anaya Muir MD, Last Rate: 25 mL/hr at 10/29/19 0301, 3.375 g at 10/29/19 0301   lactobac ac& pc-s.therm-b.anim (DESIREE Q/RISAQUAD), 1 Cap, Oral, DAILY, Anaya Muir MD, 1 Cap at 10/29/19 2030   PHENYLephrine (CLARISSA-SYNEPHRINE) 30 mg in 0.9% sodium chloride 250 mL infusion,  mcg/min, IntraVENous, TITRATE, Anaya Muir MD, Stopped at 10/28/19 0300 
  lactated Ringers infusion, 100 mL/hr, IntraVENous, CONTINUOUS, Anaya Muir MD, Last Rate: 100 mL/hr at 10/29/19 0627, 100 mL/hr at 10/29/19 8146   Vancomycin- Pharmacy to Dose, , Other, Rx Dosing/Monitoring, Anaya Muir MD 
  vancomycin (VANCOCIN) 1500 mg in  ml infusion, 1,500 mg, IntraVENous, Q24H, Anaya Muir MD, Last Rate: 250 mL/hr at 10/29/19 0301, 1,500 mg at 10/29/19 0301 IMPRESSION:  
-LLE cellulitis. -sepsis. -Hypotension. 
-elevated troponin from demand ischemia. 
-Chronic thrombocytopenia. -CAD s/p CABG  
  
RECOMMENDATIONS:please see orders for details. Case d/w nursing and on Multi D rounds. -O2 supplementation to keep sats above 92%. 
-iv abx - vanco/zosyn. 
-Iv fluids - RL @ 100. 
-wound cultures and blood cultures. -echo - 45% Tx to floor. I have personally reviewed the radiology films and reports. Subjective/Interval History:  
I have reviewed the flowsheet and previous days notes. Objective:  
 
Vital Signs:   
Visit Vitals /84 Pulse 70 Temp 97.9 °F (36.6 °C) Resp 18 Ht 6' (1.829 m) Wt 96.2 kg (212 lb 1.3 oz) SpO2 99% BMI 28.76 kg/m² TMAX(24) Intake/Output:  
Last shift:        
Last 3 shifts: 10/29 0701 - 10/29 1900 In: 200 [I.V.:200] Out: - [id number] Intake/Output Summary (Last 24 hours) at 10/29/2019 1007 Last data filed at 10/29/2019 0900 Gross per 24 hour Intake 3200 ml Output 200 ml Net 3000 ml EXAM  
 
 
exam  Other  
general Ill appearing/somnolent/ appears stated age HEENT:  Op moist no ulcers, JVD not elevated, no cervical LAD   
 Chest No pectus deformity, normal chest rise b/l HEART:  RRR no m/r/g no rubs Lungs:  CTA b/l no r/r/w, diminished BS at bases ABD Soft/NT non rigid mildly distended, hypoactive BS   
EXT No c/c/e normal peripheral pulses Skin No rashes or ulcers, no mottling Neuro awake Data I have personally reviewed data, flowsheets for the last 24 hours. Labs: 
Recent Labs 10/28/19 
0305 10/27/19 
2201 WBC 3.9* 4.9 HGB 11.5* 13.7 HCT 34.6* 40.7 PLT 51* 60* Recent Labs 10/29/19 
0411 10/28/19 
0013 10/27/19 
2201  143 140  
K 3.6 4.2 4.2 * 112* 107 CO2 26 25 27 GLU 86 130* 139* BUN 23* 28* 30* CREA 1.08 1.09 1.10 CA 8.4* 8.2* 9.3 MG  --  1.9  --   
PHOS  --  3.2  --   
ALB  --  3.1* 3.8 SGOT  --  17 20 ALT  --  14 16 ABG No results for input(s): PHI, PO2I, PCO2I in the last 72 hours. Kishan Mcmahan MD 
[facility] [address] [facility]

## 2019-10-29 NOTE — PROGRESS NOTES
Hospitalist Progress Note NAME: Donato Harris :  1929 MRN:  [id number] Assessment / Plan: 1. Cellulitis, left lower extremity - cont iv vancomycin and zosyn. Doppler negative. 2.  Hypertensio - stable with minimal intervention. 3.  Dyslipidemia - cont zocor. 4.  Chronic thrombocytopenia - monitor no heparin at this time. 5.  Coronary artery disease, status post CABG - seen by cardiology. No intervention at this time. 6.  Sepsis - resolving on iv abx. 
7.  Status post pacemaker insertion. Continue to monitor. 8. Other Issues:  Code Status: The patient is a full code. SCD for DVT prophylaxis. 25.0 - 29.9 Overweight / Body mass index is 28.76 kg/m². Subjective: Chief Complaint / Reason for Physician Visit \"no pain or chills. \". Discussed with RN events overnight. Review of Systems: 
Symptom Y/N Comments  Symptom Y/N Comments Fever/Chills    Chest Pain Poor Appetite    Edema Cough    Abdominal Pain Sputum    Joint Pain SOB/BO    Pruritis/Rash Nausea/vomit    Tolerating PT/OT Diarrhea    Tolerating Diet Constipation    Other Could NOT obtain due to:   
 
Objective: VITALS:  
Last 24hrs VS reviewed since prior progress note. Most recent are: 
Patient Vitals for the past 24 hrs: 
 Temp Pulse Resp BP SpO2  
10/29/19 1321 97.5 °F (36.4 °C) 69 20 130/82 94 % 10/29/19 1100  68 23 116/81 95 % 10/29/19 1000  70 20 117/60 95 % 10/29/19 0900  70  126/84 99 % 10/29/19 0800 97.9 °F (36.6 °C) 70 18 121/77 96 % 10/29/19 0700  72 16 104/73 97 % 10/29/19 0600  70 20 112/78 95 % 10/29/19 0500  70 21 115/72 95 % 10/29/19 0400 98 °F (36.7 °C) 70 22 109/64 97 % 10/29/19 0300  70 22 97/67 97 % 10/29/19 0200  70 16 119/74 97 % 10/29/19 0100  70 23 103/69 98 % 10/29/19 0000 98.1 °F (36.7 °C) 70 24 102/68 100 % 10/28/19 2300  70 22 99/64 99 % 10/28/19 2200  70 22 (!) 87/62 99 % 10/28/19 2100  70 23 92/57 96 % 10/28/19 2000 98.2 °F (36.8 °C) 70 23 94/83 98 % 10/28/19 1900  70 22 (!) 89/68 97 % 10/28/19 1800  70 22 115/61 96 % 10/28/19 1700  70 21 (!) 80/53 95 % 10/28/19 1630    92/55   
10/28/19 1600 98.3 °F (36.8 °C) 71 16 103/62 98 % 10/28/19 1500  70 24 92/55 96 % 10/28/19 1400  70 22 (!) 80/52 95 % Intake/Output Summary (Last 24 hours) at 10/29/2019 1352 Last data filed at 10/29/2019 1100 Gross per 24 hour Intake 3000 ml Output 150 ml Net 2850 ml PHYSICAL EXAM: 
General: WD, WN. Alert, cooperative, no acute distress   
EENT:  EOMI. Anicteric sclerae. MMM Resp:  CTA bilaterally, no wheezing or rales. No accessory muscle use CV:  Regular  rhythm,  No edema GI:  Soft, Non distended, Non tender.  +Bowel sounds Neurologic:  Alert and oriented X 3, normal speech, Psych:   Good insight. Not anxious nor agitated Skin:  No rashes. No jaundice Reviewed most current lab test results and cultures  YES Reviewed most current radiology test results   YES Review and summation of old records today    NO Reviewed patient's current orders and MAR    YES 
PMH/ reviewed - no change compared to H&P 
________________________________________________________________________ Care Plan discussed with: 
  Comments Patient Family RN Care Manager Consultant Multidiciplinary team rounds were held today with , nursing, pharmacist and clinical coordinator. Patient's plan of care was discussed; medications were reviewed and discharge planning was addressed. ________________________________________________________________________ Total NON critical care TIME:  20   Minutes Total CRITICAL CARE TIME Spent:   Minutes non procedure based Comments >50% of visit spent in counseling and coordination of care    
________________________________________________________________________ Tamara Bergman DO  
 
Procedures: see electronic medical records for all procedures/Xrays and details which were not copied into this note but were reviewed prior to creation of Plan. LABS: 
I reviewed today's most current labs and imaging studies. Pertinent labs include: 
Recent Labs 10/28/19 
0305 10/27/19 
2201 WBC 3.9* 4.9 HGB 11.5* 13.7 HCT 34.6* 40.7 PLT 51* 60* Recent Labs 10/29/19 
0411 10/28/19 
9552 10/27/19 
2201  143 140  
K 3.6 4.2 4.2 * 112* 107 CO2 26 25 27 GLU 86 130* 139* BUN 23* 28* 30* CREA 1.08 1.09 1.10 CA 8.4* 8.2* 9.3 MG  --  1.9  --   
PHOS  --  3.2  --   
ALB  --  3.1* 3.8 TBILI  --  1.0 1.3*  
SGOT  --  17 20 ALT  --  14 16 Signed: Tamara Bergman DO

## 2019-10-30 NOTE — PROGRESS NOTES
CM notes patient is a Sound Bundle. Unit CM will follow for transitions of care.  
 
ALBINA Motta

## 2019-10-30 NOTE — PROGRESS NOTES
Bedside shift change report given to Epi Pete 8 (oncoming nurse) by Romie Marie (offgoing nurse). Report included the following information SBAR, Kardex, Intake/Output and Recent Results.

## 2019-10-30 NOTE — PROGRESS NOTES
TRANSFER - OUT REPORT: 
 
Verbal report given to [facility] RN(name) on Chan Robles  being transferred to [address] (unit) for routine progression of care Report consisted of patients Situation, Background, Assessment and  
Recommendations(SBAR). Information from the following report(s) SBAR, Kardex, Intake/Output, Recent Results and Med Rec Status was reviewed with the receiving nurse. Lines:  
Peripheral IV 10/29/19 Left Forearm (Active) Site Assessment Clean, dry, & intact 10/30/2019 12:00 PM  
Phlebitis Assessment 0 10/30/2019 12:00 PM  
Infiltration Assessment 0 10/30/2019 12:00 PM  
Dressing Status Clean, dry, & intact 10/30/2019 12:00 PM  
Dressing Type Transparent;Tape 10/30/2019 12:00 PM  
Hub Color/Line Status Blue; Infusing 10/30/2019 12:00 PM  
Action Taken Open ports on tubing capped 10/30/2019 12:00 PM  
Alcohol Cap Used Yes 10/30/2019 12:00 PM  
  
 
Opportunity for questions and clarification was provided. Patient transported with: 
 [employer]

## 2019-10-30 NOTE — PROGRESS NOTES
Hospitalist Progress Note NAME: Leann Roger :  1929 MRN:  [id number] Assessment / Plan: 1.  Cellulitis, left lower extremity - cont iv vancomycin and zosyn. 2.  Hypertension - stable with minimal intervention. 3.  Dyslipidemia - cont zocor. 4.  Chronic thrombocytopenia - rpt cbc today. no heparin at this time. 5.  Coronary artery disease, status post CABG - seen by cardiology. No intervention at this time. 6.  Sepsis - resolving on iv abx. 7.  Status post pacemaker insertion.  Continue to monitor. 8. Other Issues:  Code Status:  The patient is a full code.  SCD for DVT prophylaxis.   
 consult ptot and likely home tomorrow if plts stable. 25.0 - 29.9 Overweight / Body mass index is 27.75 kg/m². Subjective: Chief Complaint / Reason for Physician Visit \"no reports of paiin or chills\". Discussed with RN events overnight. Review of Systems: 
Symptom Y/N Comments  Symptom Y/N Comments Fever/Chills    Chest Pain Poor Appetite    Edema Cough    Abdominal Pain Sputum    Joint Pain SOB/BO    Pruritis/Rash Nausea/vomit    Tolerating PT/OT Diarrhea    Tolerating Diet Constipation    Other Could NOT obtain due to:   
 
Objective: VITALS:  
Last 24hrs VS reviewed since prior progress note. Most recent are: 
Patient Vitals for the past 24 hrs: 
 Temp Pulse Resp BP SpO2  
10/30/19 0733 98.3 °F (36.8 °C) 78 16 127/83 96 % 10/30/19 0403 98.9 °F (37.2 °C) 72 16 100/66 97 % 10/29/19 2342 97.9 °F (36.6 °C) 86 18 133/82 95 % 10/29/19 2020 98.1 °F (36.7 °C) 80 18 135/80 100 % 10/29/19 1615 98.4 °F (36.9 °C) 69 18 116/75 97 % 10/29/19 1600  70     
10/29/19 1321 97.5 °F (36.4 °C) 69 20 130/82 94 % 10/29/19 1100  68 23 116/81 95 % 10/29/19 1000  70 20 117/60 95 % Intake/Output Summary (Last 24 hours) at 10/30/2019 0638 Last data filed at 10/30/2019 0403 Gross per 24 hour Intake 1088.33 ml Output 2 ml Net 1086.33 ml PHYSICAL EXAM: 
General: WD, WN. Alert, cooperative, no acute distress   
EENT:  EOMI. Anicteric sclerae. MMM Resp:  CTA bilaterally, no wheezing or rales. No accessory muscle use CV:  Regular  rhythm,  No edema GI:  Soft, Non distended, Non tender.  +Bowel sounds Neurologic:  Alert and oriented X 3, normal speech, Psych:   Good insight. Not anxious nor agitated Skin:  No rashes. No jaundice Reviewed most current lab test results and cultures  YES Reviewed most current radiology test results   YES Review and summation of old records today    NO Reviewed patient's current orders and MAR    YES 
PMH/SH reviewed - no change compared to H&P 
________________________________________________________________________ Care Plan discussed with: 
  Comments Patient Family RN Care Manager Consultant Multidiciplinary team rounds were held today with , nursing, pharmacist and clinical coordinator. Patient's plan of care was discussed; medications were reviewed and discharge planning was addressed. ________________________________________________________________________ Total NON critical care TIME:  20   Minutes Total CRITICAL CARE TIME Spent:   Minutes non procedure based Comments >50% of visit spent in counseling and coordination of care    
________________________________________________________________________ Clarene Milks, DO  
 
Procedures: see electronic medical records for all procedures/Xrays and details which were not copied into this note but were reviewed prior to creation of Plan. LABS: 
I reviewed today's most current labs and imaging studies. Pertinent labs include: 
Recent Labs 10/28/19 
0305 10/27/19 
2201 WBC 3.9* 4.9 HGB 11.5* 13.7 HCT 34.6* 40.7 PLT 51* 60* Recent Labs 10/30/19 
0400 10/29/19 
0411 10/28/19 
0305 10/27/19 
2201  142 143 140  
K 3.6 3.6 4.2 4.2 * 113* 112* 107 CO2 25 26 25 27 GLU 92 86 130* 139* BUN 16 23* 28* 30* CREA 0.94 1.08 1.09 1.10 CA 8.5 8.4* 8.2* 9.3 MG  --   --  1.9  --   
PHOS  --   --  3.2  --   
ALB  --   --  3.1* 3.8 TBILI  --   --  1.0 1.3*  
SGOT  --   --  17 20 ALT  --   --  14 16 Signed: Harpal Jacob, DO

## 2019-10-30 NOTE — PROGRESS NOTES
Cardiology Progress Note 10/30/2019     Admit Date: 10/27/2019 Admit Diagnosis: Sepsis ([facility] [A41.9]  CC: none currently Assessment:  
Principal Problem: 
  Sepsis ([facility] (10/28/2019) Plan:  
 
Echo showed consistent LVEF from before Restarted home metoprolol, changed simvastatin to atorvastatin My be able to restart RAAS tomorrow No invasive cardiac plans Subjective:   
 
Leann Roger cont to feel better Objective:  
 Physical Exam: 
Overall VSSAF;   
Visit Vitals /83 (BP 1 Location: Left arm, BP Patient Position: At rest) Pulse 78 Temp 98.3 °F (36.8 °C) Resp 16 Ht 6' (1.829 m) Wt 92.8 kg (204 lb 9.4 oz) SpO2 96% BMI 27.75 kg/m² Temp (24hrs), Av.2 °F (36.8 °C), Min:97.5 °F (36.4 °C), Max:98.9 °F (37.2 °C) Patient Vitals for the past 8 hrs: 
 Pulse 10/30/19 0733 78  
10/30/19 0403 72 Patient Vitals for the past 8 hrs: 
 Resp 10/30/19 0733 16  
10/30/19 0403 16 Patient Vitals for the past 8 hrs: 
 BP  
10/30/19 0733 127/83  
10/30/19 0403 100/66  
  
10/28 1901 - 10/30 0700 In: 3288.3 [I.V.:3288.3] Out: 102 [Urine:102] General Appearance: Well developed, well nourished, no acute distress. Ears/Nose/Mouth/Throat:   Normal MM; anicteric. JVP: WNL Resp:   Lungs clear to auscultation bilaterally. Nl resp effort. Cardiovascular:  RRR, S1, S2 normal, no new murmur. No gallop or rub. Abdomen:   Soft, non-tender, bowel sounds are present. Extremities: No edema bilaterally. Skin: 
Neuro: Warm and dry. A/O x3, grossly nonfocal  
                     
Data Review:    
Telemetry independently reviewed :   normal sinus rhythm Labs:  
Recent Results (from the past 24 hour(s)) METABOLIC PANEL, BASIC Collection Time: 10/30/19  4:00 AM  
Result Value Ref Range Sodium 141 136 - 145 mmol/L Potassium 3.6 3.5 - 5.1 mmol/L Chloride 113 (H) 97 - 108 mmol/L  
 CO2 25 21 - 32 mmol/L  Anion gap 3 (L) 5 - 15 mmol/L  
 Glucose 92 65 - 100 mg/dL BUN 16 6 - 20 MG/DL Creatinine 0.94 0.70 - 1.30 MG/DL  
 BUN/Creatinine ratio 17 12 - 20 GFR est AA >60 >60 ml/min/1.73m2 GFR est non-AA >60 >60 ml/min/1.73m2 Calcium 8.5 8.5 - 10.1 MG/DL Current medications reviewed Bipin Nation MD

## 2019-10-30 NOTE — PROGRESS NOTES
Problem: Mobility Impaired (Adult and Pediatric) Goal: *Acute Goals and Plan of Care (Insert Text) Description FUNCTIONAL STATUS PRIOR TO ADMISSION: Patient was independent and active without use of DME. Plays golf 4x/week. Still drives. HOME SUPPORT PRIOR TO ADMISSION: The patient lived with son and daughter-in-law but did not require assist. 
 
Physical Therapy Goals Initiated 10/30/2019 1. Patient will move from supine to sit and sit to supine , scoot up and down and roll side to side in bed with independence within 7 day(s). 2.  Patient will transfer from bed to chair and chair to bed with modified independence using the least restrictive device within 7 day(s). 3.  Patient will perform sit to stand with modified independence within 7 day(s). 4.  Patient will ambulate with modified independence for 150 feet with the least restrictive device within 7 day(s). 5.  Patient will ascend/descend 12 stairs with handrail(s) with supervision/set-up within 7 day(s). Outcome: Progressing Towards Goal 
 PHYSICAL THERAPY EVALUATION Patient: Vidya Vazquez (47 y.o. male) Date: 10/30/2019 Primary [facility]: [facility] [A41.9] Precautions: standard ASSESSMENT Based on the objective data described below, the patient presents with generalized weakness, impaired standing balance, and decreased activity tolerance all slightly limiting patients functional mobility. Patient required CGA overall and with tendency to reach for support while ambulating. Noted increased dyspnea with activity but recovered with standing and sitting rest breaks. Education on pacing activity as needed while attempting to increase duration or frequency. Patient was very independent prior to admission and expect him to continue to progress well with increased activity. Has a very supportive family. Current Level of Function Impacting Discharge (mobility/balance): CGA overall Functional Outcome Measure: The patient scored 70/100 on the Barthel Index outcome measure which is indicative of mild dependency with functional mobility/ADL. Other factors to consider for discharge: very supportive family, very active prior Patient will benefit from skilled therapy intervention to address the above noted impairments. PLAN : 
Recommendations and Planned Interventions: bed mobility training, transfer training, gait training, therapeutic exercises, patient and family training/education and therapeutic activities Frequency/Duration: Patient will be followed by physical therapy:  5 times a week to address goals. Recommendation for discharge: (in order for the patient to meet his/her long term goals) To be determined: Likely no needs but did discuss with family and patient options of outpatient PT if needs arise, through PCP This discharge recommendation: 
Has been made in collaboration with the attending provider and/or case management IF patient discharges home will need the following DME: none SUBJECTIVE:  
Patient stated I feel trapped in this room.  OBJECTIVE DATA SUMMARY:  
HISTORY:   
Past Medical History:  
Diagnosis Date Acute MI ([facility] Cancer ([facility] Skin Hypertension Pacemaker 06/18/2013 [employer]onic Pacer/Defibrillator that is not MRI compatible per [employer]. Past Surgical History:  
Procedure Laterality Date CARDIAC SURG PROCEDURE UNLIST    
 cabg x 3 HX ORTHOPAEDIC    
 shoulder, hand and back surgery HX PACEMAKER Personal factors and/or comorbidities impacting plan of care:  
 
Home Situation Home Environment: Private residence One/Two Story Residence: Two story Living Alone: No 
Support Systems: Child(medardo) Patient Expects to be Discharged to[de-identified] Private residence Current DME Used/Available at Home: None EXAMINATION/PRESENTATION/DECISION MAKING:  
Critical Behavior: 
Neurologic State: Alert Orientation Level: Oriented X4 Cognition: Follows commands Hearing: Auditory Auditory Impairment: None Range Of Motion: 
AROM: Within functional limits Strength:   
Strength: Generally decreased, functional 
  
  
  
  
  
  
Tone & Sensation:  
Tone: Normal 
  
  
  
  
Sensation: Intact Coordination: 
Coordination: Within functional limits Functional Mobility: 
Bed Mobility: 
Rolling: Supervision Supine to Sit: Supervision Scooting: Supervision Transfers: 
Sit to Stand: Contact guard assistance Stand to Sit: Contact guard assistance Balance:  
Sitting: Intact Standing: Impaired Standing - Static: Good Standing - Dynamic : Fair Ambulation/Gait Training: 
Distance (ft): 80 Feet (ft) Assistive Device: Cane, straight;Gait belt(patient reaching for hallway railing with other hand) Ambulation - Level of Assistance: Contact guard assistance Gait Abnormalities: Decreased step clearance;Trunk sway increased Base of Support: Widened Speed/Fanta: Slow Step Length: Right shortened;Left shortened Reaching for objects to steady self with ambulation. Offered RW but patient declined. Functional Measure: 
Barthel Index: 
 
Bathin Bladder: 10 Bowels: 10 
Groomin Dressin Feeding: 10 Mobility: 10 Stairs: 5 Toilet Use: 5 Transfer (Bed to Chair and Back): 10 Total: 70/100 The Barthel ADL Index: Guidelines 1. The index should be used as a record of what a patient does, not as a record of what a patient could do. 2. The main aim is to establish degree of independence from any help, physical or verbal, however minor and for whatever reason. 3. The need for supervision renders the patient not independent. 4. A patient's performance should be established using the best available evidence.  Asking the patient, friends/relatives and nurses are the usual sources, but direct observation and common sense are also important. However direct testing is not needed. 5. Usually the patient's performance over the preceding 24-48 hours is important, but occasionally longer periods will be relevant. 6. Middle categories imply that the patient supplies over 50 per cent of the effort. 7. Use of aids to be independent is allowed. Lillie Hewitt., Barthel, D.W. ([id number]). Functional evaluation: the Barthel Index. [facility] (14)2. Frankey Bohr der [place], J.J.M.F, Ivette Alcantara., Kristan Austin., [place], [facility] (1999). Measuring the change indisability after inpatient rehabilitation; comparison of the responsiveness of the Barthel Index and Functional [place] Measure. Journal of Neurology, Neurosurgery, and Psychiatry, 66(4), 481-989. MYKEL Wilkerson, HANNA Parsons, & Jyoti Hall MTysonA. (2004.) Assessment of post-stroke quality of life in cost-effectiveness studies: The usefulness of the Barthel Index and the EuroQoL-5D. [facility], 13, 087-98 Physical Therapy Evaluation Charge Determination History Examination Presentation Decision-Making MEDIUM  Complexity : 1-2 comorbidities / personal factors will impact the outcome/ POC  LOW Complexity : 1-2 Standardized tests and measures addressing body structure, function, activity limitation and / or participation in recreation  LOW Complexity : Stable, uncomplicated  Other outcome measures Barthel Index  MEDIUM Based on the above components, the patient evaluation is determined to be of the following complexity level: LOW Pain Rating: 
No c/o pain Activity Tolerance:  
Fair and requires rest breaks After treatment patient left in no apparent distress:  
Sitting in chair, Call bell within reach and Caregiver / family present COMMUNICATION/EDUCATION:  
The patients plan of care was discussed with: Registered Nurse and . Fall prevention education was provided and the patient/caregiver indicated understanding., Patient/family have participated as able in goal setting and plan of care. and Patient/family agree to work toward stated goals and plan of care. Thank you for this referral. 
Bailey Awad, PT, DPT Time Calculation: 31 mins

## 2019-10-30 NOTE — PROGRESS NOTES
TRANSITION OF CARE Return home to care of son and daughter in law. Son is considering hiring of private caregivers in their home. CM reviewed case with treatment team during [address].  Patient continues with plan to return to home with family assist.

## 2019-10-30 NOTE — PROGRESS NOTES
Problem: Falls - Risk of 
Goal: *Absence of Falls Description Document Justin Bowles Fall Risk and appropriate interventions in the flowsheet. Outcome: Progressing Towards Goal 
Note:  
Fall Risk Interventions: 
Mobility Interventions: Communicate number of staff needed for ambulation/transfer, Patient to call before getting OOB, PT Consult for mobility concerns, PT Consult for assist device competence, Utilize walker, cane, or other assistive device Mentation Interventions: Increase mobility, More frequent rounding, Reorient patient, Update white board, Toileting rounds, Adequate sleep, hydration, pain control, Evaluate medications/consider consulting pharmacy Medication Interventions: Evaluate medications/consider consulting pharmacy, Patient to call before getting OOB, Teach patient to arise slowly Elimination Interventions: Call light in reach, Toileting schedule/hourly rounds, Patient to call for help with toileting needs Problem: Pressure Injury - Risk of 
Goal: *Prevention of pressure injury Description Document Kadeem Scale and appropriate interventions in the flowsheet. Outcome: Progressing Towards Goal 
Note:  
Pressure Injury Interventions: 
Sensory Interventions: Avoid rigorous massage over bony prominences, Discuss PT/OT consult with provider, Keep linens dry and wrinkle-free, Maintain/enhance activity level, Minimize linen layers, Turn and reposition approx. every two hours (pillows and wedges if needed), Pressure redistribution bed/mattress (bed type) Moisture Interventions: Check for incontinence Q2 hours and as needed, Apply protective barrier, creams and emollients, Limit adult briefs, Offer toileting Q_hr Activity Interventions: Pressure redistribution bed/mattress(bed type), PT/OT evaluation, Increase time out of bed Mobility Interventions: HOB 30 degrees or less, Pressure redistribution bed/mattress (bed type), PT/OT evaluation, Turn and reposition approx. every two hours(pillow and wedges) Nutrition Interventions: Document food/fluid/supplement intake Friction and Shear Interventions: Lift sheet Problem: Sepsis: Day 2 Goal: Activity/Safety Outcome: Progressing Towards Goal 
Goal: Medications Outcome: Progressing Towards Goal 
Goal: Respiratory Outcome: Progressing Towards Goal 
Goal: Treatments/Interventions/Procedures Outcome: Progressing Towards Goal 
Goal: Psychosocial 
Outcome: Progressing Towards Goal

## 2019-10-31 PROBLEM — A41.9 SEPSIS (HCC): Status: RESOLVED | Noted: 2019-01-01 | Resolved: 2019-01-01

## 2019-10-31 NOTE — PROGRESS NOTES
OCCUPATIONAL THERAPY EVALUATION/DISCHARGE Patient: Figueroa Rodgers (89 y.o. male) Date: 10/31/2019 Primary Diagnosis: Sepsis ([facility] [A41.9] Precautions: fall ASSESSMENT Patient is currently performing ADLs at overall supervision level and reports he is near his functional baseline. Was received sitting in chair and reports he already performed full ADL routine this morning without difficulty and declined additional practice. Demonstrates functional access to UB and LB, performed sit-stand, ambulated within room, and practiced reaching in upper/ low cabinets. Patient used [facility] with R hand and occasionally reached to furniture for support with LUE and reports this is normal for him. Patient was receptive to education on fall prevention as well as energy conservation/ work simplification and was provided with handouts for each. He lives with his son and daughter in-law and reports they can assist him PRN with transition home. Current Level of Function (ADLs/self-care): Supervision Functional Outcome Measure: The patient scored 90/100 on the Barthel Index outcome measure which is indicative of ~10% impairment in functional ADL performance. PLAN : 
Recommendation for discharge: (in order for the patient to meet his/her long term goals) No skilled occupational therapy/ follow up rehabilitation needs identified at this time. This discharge recommendation: A follow-up discussion with the attending provider and/or case management is planned SUBJECTIVE:  
Patient stated I am ready to go home.  OBJECTIVE DATA SUMMARY:  
HISTORY:  
Past Medical History:  
Diagnosis Date  Acute MI ([address]  Cancer ([facility] Skin  Hypertension  Pacemaker 06/18/2013 [employer]onic Pacer/Defibrillator that is not MRI compatible per [employer]. Past Surgical History:  
Procedure Laterality Date  CARDIAC SURG PROCEDURE UNLIST    
 cabg x 3  
 HX ORTHOPAEDIC    
 shoulder, hand and back surgery  HX PACEMAKER Prior Level of Function/Environment/Context: modified independent for ADLs and functional mobility, using SPC intermittently. Active, driving, golfing. Expanded or extensive additional review of patient history:  
Home Situation Home Environment: Private residence One/Two Story Residence: Two story Living Alone: No 
Support Systems: Child(medardo) Patient Expects to be Discharged to[de-identified] Private residence Current DME Used/Available at Home: None EXAMINATION OF PERFORMANCE DEFICITS: 
Cognitive/Behavioral Status: 
Neurologic State: Alert Orientation Level: Oriented X4 Cognition: Appropriate decision making; Appropriate for age attention/concentration; Appropriate safety awareness; Follows commands Perception: Appears intact Perseveration: No perseveration noted Safety/Judgement: Awareness of environment; Insight into deficits Skin: visible skin appears intact Edema: none noted Hearing: Auditory Auditory Impairment: None Vision/Perceptual:   
    
    
    
  
    
Acuity: Within Defined Limits Corrective Lenses: Glasses Range of Motion: 
 
AROM: Within functional limits Strength: 
 
Strength: Generally decreased, functional 
  
  
  
  
 
Coordination: 
Coordination: Within functional limits Fine Motor Skills-Upper: Left Intact; Right Intact Gross Motor Skills-Upper: Right Intact; Left Intact Tone & Sensation: 
 
Tone: Normal 
Sensation: Intact Balance: 
Sitting: Intact Standing: Impaired Standing - Static: Good Standing - Dynamic : Fair Functional Mobility and Transfers for ADLs: 
 
Transfers: 
Sit to Stand: Supervision Stand to Sit: Supervision ADL Assessment: 
Feeding: Independent(inferred) Oral Facial Hygiene/Grooming: Supervision(inferred) Bathing: Supervision(inferred) Upper Body Dressing: Supervision(inferred) Lower Body Dressing: Supervision(inferred) Toileting: Supervision(inferred) ADL Intervention and task modifications: 
  
 
  
Cognitive Retraining Safety/Judgement: Awareness of environment; Insight into deficits Functional Measure: 
Barthel Index: 
 
Bathin Bladder: 10 Bowels: 10 
Groomin Dressing: 10 Feeding: 10 Mobility: 10 Stairs: 5 Toilet Use: 10 Transfer (Bed to Chair and Back): 15 Total: 90/100 The Barthel ADL Index: Guidelines 1. The index should be used as a record of what a patient does, not as a record of what a patient could do. 2. The main aim is to establish degree of independence from any help, physical or verbal, however minor and for whatever reason. 3. The need for supervision renders the patient not independent. 4. A patient's performance should be established using the best available evidence. Asking the patient, friends/relatives and nurses are the usual sources, but direct observation and common sense are also important. However direct testing is not needed. 5. Usually the patient's performance over the preceding 24-48 hours is important, but occasionally longer periods will be relevant. 6. Middle categories imply that the patient supplies over 50 per cent of the effort. 7. Use of aids to be independent is allowed. Liliam Feldman., Barthel, DTysonWTyson ([id number]). Functional evaluation: the Barthel Index. [address] [facility] (14)2. RAMIN Mccall, Sandra Almazan., Josy Donaldson., [place], [address] [place] [address] (). Measuring the change indisability after inpatient rehabilitation; comparison of the responsiveness of the Barthel Index and Functional [place] Measure. Journal of Neurology, Neurosurgery, and Psychiatry, 66(4), 127-013. JERROD [email or web address], HANNA Parsons, & Lindsey Medeiros MTysonA. (2004.) Assessment of post-stroke quality of life in cost-effectiveness studies: The usefulness of the Barthel Index and the EuroQoL-5D. [facility], 13, 416-64 Occupational Therapy Evaluation Charge Determination History Examination Decision-Making LOW Complexity : Brief history review  LOW Complexity : 1-3 performance deficits relating to physical, cognitive , or psychosocial skils that result in activity limitations and / or participation restrictions  MEDIUM Complexity : Patient may present with comorbidities that affect occupational performnce. Miniml to moderate modification of tasks or assistance (eg, physical or verbal ) with assesment(s) is necessary to enable patient to complete evaluation Based on the above components, the patient evaluation is determined to be of the following complexity level: LOW Pain Rating: 
Patient did not report pain Activity Tolerance: With activity: HR 79, SPO2 96% on RA After treatment patient left in no apparent distress:   
Sitting in chair and Call bell within reach COMMUNICATION/EDUCATION:  
The patients plan of care was discussed with: Physical Therapist and Registered Nurse. Thank you for this referral. 
Miguel Hunt OT Time Calculation: 27 mins

## 2019-10-31 NOTE — PROGRESS NOTES
Cardiology Progress Note 10/31/2019     Admit Date: 10/27/2019 Admit Diagnosis: Sepsis ([facility] [address] [A41.9]  CC: none currently Assessment:  
Active Problems: * No active hospital problems. * 
 
Plan:  
 
Echo showed consistent LVEF from before Restarted home metoprolol, changed simvastatin to atorvastatin Add ACEi tomorrow Subjective:   
 
Sauravia Nails has no cardiac c/o Objective:  
 Physical Exam: 
Overall VSSAF;   
Visit Vitals /67 Pulse 92 Temp 97.8 °F (36.6 °C) Resp 18 Ht 6' (1.829 m) Wt 95.6 kg (210 lb 11.2 oz) SpO2 96% BMI 28.58 kg/m² Temp (24hrs), Av.1 °F (36.7 °C), Min:97.7 °F (36.5 °C), Max:98.6 °F (37 °C) Patient Vitals for the past 8 hrs: 
 Pulse 10/31/19 0906 92 Patient Vitals for the past 8 hrs: 
 Resp 10/31/19 0906 18 Patient Vitals for the past 8 hrs: 
 BP  
10/31/19 0906 114/67  
  
10/29 1901 - 10/31 0700 In: 540 [P.O.:240; I.V.:300] Out: 2 [Urine:2] General Appearance: Well developed, well nourished, no acute distress. Ears/Nose/Mouth/Throat:   Normal MM; anicteric. JVP: WNL Resp:   Lungs clear to auscultation bilaterally. Nl resp effort. Cardiovascular:  RRR, S1, S2 normal, no new murmur. No gallop or rub. Abdomen:   Soft, non-tender, bowel sounds are present. Extremities: No edema bilaterally. Skin: 
Neuro: Warm and dry. A/O x3, grossly nonfocal  
                     
Data Review:    
Telemetry independently reviewed :   normal sinus rhythm Labs:  
Recent Results (from the past 24 hour(s)) METABOLIC PANEL, BASIC Collection Time: 10/31/19  2:57 AM  
Result Value Ref Range Sodium 140 136 - 145 mmol/L Potassium 3.7 3.5 - 5.1 mmol/L Chloride 111 (H) 97 - 108 mmol/L  
 CO2 23 21 - 32 mmol/L Anion gap 6 5 - 15 mmol/L Glucose 89 65 - 100 mg/dL BUN 16 6 - 20 MG/DL Creatinine 0.96 0.70 - 1.30 MG/DL  
 BUN/Creatinine ratio 17 12 - 20 GFR est AA >60 >60 ml/min/1.73m2 GFR est non-AA >60 >60 ml/min/1.73m2 Calcium 8.5 8.5 - 10.1 MG/DL Annmarie Yun Collection Time: 10/31/19  2:57 AM  
Result Value Ref Range Vancomycin,trough 14.9 (H) 5.0 - 10.0 ug/mL Reported dose date: NOT PROVIDED Reported dose time: NOT PROVIDED Reported dose: NOT PROVIDED UNITS Current medications reviewed Lorrie Castellano MD

## 2019-10-31 NOTE — PROGRESS NOTES
Problem: Mobility Impaired (Adult and Pediatric) Goal: *Acute Goals and Plan of Care (Insert Text) Description FUNCTIONAL STATUS PRIOR TO ADMISSION: Patient was independent and active without use of DME. Plays golf 4x/week. Still drives. HOME SUPPORT PRIOR TO ADMISSION: The patient lived with son and daughter-in-law but did not require assist. 
 
Physical Therapy Goals Initiated 10/30/2019 1. Patient will move from supine to sit and sit to supine , scoot up and down and roll side to side in bed with independence within 7 day(s). 2.  Patient will transfer from bed to chair and chair to bed with modified independence using the least restrictive device within 7 day(s). 3.  Patient will perform sit to stand with modified independence within 7 day(s). 4.  Patient will ambulate with modified independence for 150 feet with the least restrictive device within 7 day(s). 5.  Patient will ascend/descend 12 stairs with handrail(s) with supervision/set-up within 7 day(s). Outcome: Progressing Towards Goal 
 PHYSICAL THERAPY TREATMENT Patient: Fabricio Hunt (61 y.o. male) Date: 10/31/2019 Diagnosis: Sepsis ([facility] [A41.9] Sepsis ([facility] Precautions:   
Chart, physical therapy assessment, plan of care and goals were reviewed. ASSESSMENT Patient continues with skilled PT services and is progressing towards goals. Patient initially ambulating with [facility] presenting with wall cruising. Provided patient with rolling walker presenting with improved balance and posture. Pt required multiple standing rest breaks 2/2 fatigue. Pt fairly steady with no LOB or buckling of knees noted. Pt then returned to room and seated in chair. Current Level of Function Impacting Discharge (mobility/balance): CGA for ambulation of 200 ft total 
 
Other factors to consider for discharge: will have support at home, golfs 4 days/wk PLAN : 
Patient continues to benefit from skilled intervention to address the above impairments. Continue treatment per established plan of care. to address goals. Recommendation for discharge: (in order for the patient to meet his/her long term goals) Outpatient physical therapy follow up recommended for endurance This discharge recommendation: 
Has been made in collaboration with the attending provider and/or case management IF patient discharges home will need the following DME: pt's son states they will have a rolling walker or purchase one SUBJECTIVE:  
Patient stated I am going to lose my tan if I stay in here.  OBJECTIVE DATA SUMMARY:  
Critical Behavior: 
Neurologic State: Alert Orientation Level: Oriented X4 Cognition: Appropriate decision making, Appropriate for age attention/concentration, Appropriate safety awareness, Follows commands Safety/Judgement: Awareness of environment, Insight into deficits Functional Mobility Training: 
Bed Mobility: 
 
Transfers: 
Sit to Stand: Supervision Stand to Sit: Supervision Balance: 
Sitting: Intact Standing: Impaired Standing - Static: Good Standing - Dynamic : Fair Ambulation/Gait Training: 
Distance (ft): 100 Feet (ft)(x2) 
Assistive Device: Gait belt;Walker, rolling Ambulation - Level of Assistance: Contact guard assistance Base of Support: Widened Speed/Fanta: Slow Step Length: Left shortened;Right shortened Activity Tolerance:  
Good Please refer to the flowsheet for vital signs taken during this treatment. After treatment patient left in no apparent distress:  
Sitting in chair, Call bell within reach and Caregiver / family present COMMUNICATION/COLLABORATION:  
The patients plan of care was discussed with: Occupational Therapist and Registered Nurse Clarice Salas, PT, DPT Time Calculation: 25 mins

## 2019-10-31 NOTE — CONSULTS
Asked to see patient for leg wound on the left. These were evaluated by wound care this am. There is a 2.2x.5 cm scab- This can be left open to air. THere is a 1x1 cm raised area- This does not look infected. This has been present for 2 months. This does not require intervention while he is in the hospital as he already has an appointment to have this  addressed by his dermatologist upon discharge. Call if questions

## 2019-10-31 NOTE — PROGRESS NOTES
Occupational Therapy Occupational therapy evaluation completed. Full documentation to follow. Recommend d/c home when medically stable with no additional OT needs.  
 
Nikole Bates, OTR/L

## 2019-10-31 NOTE — PROGRESS NOTES
Hospitalist Progress Note NAME: Ela Kumar :  1929 MRN:  [id number] Assessment / Plan: 1.  Cellulitis, left lower extremity - cont iv vancomycin and zosyn. - changed to cleocin as platelets drop and else for de-escalation of rx  
1.a. Lesion lt leg suggestive of furuncle, for lancing and cult. 2.  Hypertension - stable with minimal intervention. BP Readings from Last 1 Encounters:  
10/31/19 114/67 3.  Dyslipidemia - cont zocor. 4.  Chronic thrombocytopenia - rpt cbc today. no heparin at this time. Worst likely in setting of zosyn, should return to baseline in time. 5.  Coronary artery disease, status post CABG - seen by cardiology. No intervention at this time. 6.  Sepsis - resolving on iv abx. Resolved abx down adjusted. 7.  Status post pacemaker insertion.  Continue to monitor. 8. Other Issues:  Code Status:  The patient is a full code.  SCD for DVT prophylaxis.   
 consult ptot and likely home tomorrow if plts stable. 25.0 - 29.9 Overweight / Body mass index is 28.58 kg/m². Subjective: Chief Complaint / Reason for Physician Visit \"no reports of paiin or chills\". Discussed with RN events overnight. Review of Systems: 
Symptom Y/N Comments  Symptom Y/N Comments Fever/Chills x   Chest Pain Poor Appetite x   Edema x Cough    Abdominal Pain Sputum    Joint Pain x   
SOB/BO x   Pruritis/Rash Nausea/vomit x   Tolerating PT/OT Diarrhea x   Tolerating Diet y Constipation xx   Other Could NOT obtain due to:   
 
Objective: VITALS:  
Last 24hrs VS reviewed since prior progress note. Most recent are: 
Patient Vitals for the past 24 hrs: 
 Temp Pulse Resp BP SpO2  
10/31/19 0906 97.8 °F (36.6 °C) 92 18 114/67 96 % 10/31/19 0232 98.3 °F (36.8 °C) 64 16 99/63 95 % 10/30/19 2038 98.6 °F (37 °C) 78 18 111/73 100 % 10/30/19 1710 98 °F (36.7 °C) 67 18 104/68 96 % 10/30/19 1442 98.4 °F (36.9 °C) 60 17 132/76 96 % 10/30/19 1439 97.7 °F (36.5 °C) 76 17 99/67 97 % No intake or output data in the 24 hours ending 10/31/19 1303 PHYSICAL EXAM: 
General: WD, WN. Alert, cooperative, no acute distress   
EENT:  EOMI. Anicteric sclerae. MMM Resp:  CTA bilaterally, no wheezing or rales. No accessory muscle use CV:  Regular  rhythm,  No edema GI:  Soft, Non distended, Non tender.  +Bowel sounds Neurologic:  Alert and oriented X 3, normal speech, Psych:   Good insight. Not anxious nor agitated Skin:  Furuncle lt chin Reviewed most current lab test results and cultures  YES Reviewed most current radiology test results   YES Review and summation of old records today    NO Reviewed patient's current orders and MAR    YES 
PMH/SH reviewed - no change compared to H&P 
________________________________________________________________________ Care Plan discussed with: 
  Comments Patient x Family  x   
RN x Care Manager x Consultant  x Multidiciplinary team rounds were held today with , nursing, pharmacist and clinical coordinator. Patient's plan of care was discussed; medications were reviewed and discharge planning was addressed. ________________________________________________________________________ Total NON critical care TIME:  20   Minutes Total CRITICAL CARE TIME Spent:   Minutes non procedure based Comments >50% of visit spent in counseling and coordination of care    
________________________________________________________________________ Doris Urias MD  
 
Procedures: see electronic medical records for all procedures/Xrays and details which were not copied into this note but were reviewed prior to creation of Plan. LABS: 
I reviewed today's most current labs and imaging studies. Pertinent labs include: No results for input(s): WBC, HGB, HCT, PLT, HGBEXT, HCTEXT, PLTEXT, HGBEXT, HCTEXT, PLTEXT in the last 72 hours. Recent Labs 10/31/19 3230 10/30/19 
0400 10/29/19 
0411  141 142  
K 3.7 3.6 3.6 * 113* 113* CO2 23 25 26 GLU 89 92 86 BUN 16 16 23* CREA 0.96 0.94 1.08  
CA 8.5 8.5 8.4* Signed: Dean To MD

## 2019-10-31 NOTE — WOUND CARE
WOCN Note:  
  
Reconsult for assessment of left leg and sacrum. 
  
Chart reviewed. Admitted DX:  Sepsis Past Medical History: htn, dyslipidemia, CAD with CABG, pacer, CHF,thrombocytopenia. 
  
Assessment:  
Patient is alert, communicative and sitting up in chair. Patient stands with assistance of 1. Bed: [facility] Incontinence brief in place. Patient reports no pain. Heels intact without erythema. Sacrum and buttocks intact with blanching pink erythema. 
  
1. POA Left low leg, 2.2 x 0.5 x 0 cm dry maroon scab. Left open to air. 2. POA Left low leg, 1 x 1 x 0 cm pink raised area with white center. Left open to air. Patient reports that his dermatologist is following the area and plans to remove. Recommendations: 1. Left low leg:  Daily cleanse and leave open to air. 
  
Skin Care & Pressure Prevention: 
Minimize layers of linen/pads under patient to optimize support surface. Turn/reposition approximately every 2 hours and offload heels. Manage incontinence / promote continence. Use hydraguard cream (blue tube) to protect skin. 
  
Discussed above plan with patient and Inge Moreira RN. 
  
Transition of Care: Plan to follow as needed while admitted to hospital. 
  
HUGO JacobN RN [facility] Wound Care Office [phone] Pager [phone]

## 2019-11-01 NOTE — PROGRESS NOTES
Problem: Falls - Risk of 
Goal: *Absence of Falls Description Document Juan Carlos Castro Fall Risk and appropriate interventions in the flowsheet. Outcome: Progressing Towards Goal 
Note:  
Fall Risk Interventions: 
Mobility Interventions: Patient to call before getting OOB Mentation Interventions: Adequate sleep, hydration, pain control Medication Interventions: Teach patient to arise slowly, Patient to call before getting OOB Elimination Interventions: Call light in reach, Patient to call for help with toileting needs Problem: Patient Education: Go to Patient Education Activity Goal: Patient/Family Education Outcome: Progressing Towards Goal 
  
Problem: Sepsis: Day 4 Goal: Activity/Safety Outcome: Progressing Towards Goal 
Goal: Diagnostic Test/Procedures Outcome: Progressing Towards Goal 
Goal: Nutrition/Diet Outcome: Progressing Towards Goal 
Goal: Discharge Planning Outcome: Progressing Towards Goal 
Goal: Medications Outcome: Progressing Towards Goal 
Goal: Respiratory Outcome: Progressing Towards Goal 
Goal: Treatments/Interventions/Procedures Outcome: Progressing Towards Goal 
Goal: Psychosocial 
Outcome: Progressing Towards Goal 
Goal: *Oxygen saturation within defined limits Outcome: Progressing Towards Goal 
Goal: *Hemodynamically stable Outcome: Progressing Towards Goal 
Goal: *Vital signs within defined limits Outcome: Progressing Towards Goal 
Goal: *Tolerating diet Outcome: Progressing Towards Goal 
Goal: *Demonstrates progressive activity Outcome: Progressing Towards Goal 
Goal: *Fluid volume maintenance Outcome: Progressing Towards Goal

## 2019-11-01 NOTE — DISCHARGE INSTRUCTIONS
Discharge Instructions       PATIENT ID: Judith Covarrubias  MRN: [id number]   YOB: 1929    DATE OF ADMISSION: 10/27/2019  9:30 PM    DATE OF DISCHARGE: 11/1/2019    PRIMARY CARE PROVIDER: Neli Pollock MD     ATTENDING PHYSICIAN: Jovita Aguilar MD  DISCHARGING PROVIDER: Antonette Ernst MD    To contact this individual call [phone] and ask the  to page. If unavailable ask to be transferred the Adult Hospitalist Department. DISCHARGE DIAGNOSES cellulitis     CONSULTATIONS: IP CONSULT TO CARDIOLOGY  IP CONSULT TO GENERAL SURGERY    PROCEDURES/SURGERIES: * No surgery found *    PENDING TEST RESULTS:   At the time of discharge the following test results are still pending: na    FOLLOW UP APPOINTMENTS:   Follow-up Information     Follow up With Specialties Details Why Contact Info    Neli Pollock MD Internal Medicine In 1 week  [address] [place] [address] [address]  [phone]      Neli Pollock MD Internal Medicine   [address] [address]       Uriel Aguirre MD Cardiology  as directed  [address]  [phone]             ADDITIONAL CARE RECOMMENDATIONS: na    DIET: Resume previous diet     ACTIVITY: Activity as tolerated    WOUND CARE: na    EQUIPMENT needed: na      DISCHARGE MEDICATIONS:   See Medication Reconciliation Form    · It is important that you take the medication exactly as they are prescribed. · Keep your medication in the bottles provided by the pharmacist and keep a list of the medication names, dosages, and times to be taken in your wallet. · Do not take other medications without consulting your doctor. NOTIFY YOUR PHYSICIAN FOR ANY OF THE FOLLOWING:   Fever over 101 degrees for 24 hours. Chest pain, shortness of breath, fever, chills, nausea, vomiting, diarrhea, change in mentation, falling, weakness, bleeding.  Severe pain or pain not relieved by medications. Or, any other signs or symptoms that you may have questions about.       DISPOSITION:    Home With:   OT  PT  HH  RN       SNF/Inpatient Rehab/LTAC    Independent/assisted living    Hospice    Other:            Signed:   Dean To MD  11/1/2019  9:54 AM

## 2019-11-01 NOTE — ROUTINE PROCESS
Bedside and Verbal shift change report given to Jennette Bernheim (oncoming nurse) by Helen (offgoing nurse). Report included the following information SBAR.

## 2019-11-01 NOTE — PROGRESS NOTES
Spiritual Care Partner Volunteer visited patient in room 622/02 on 11.01.19. Documented by: : Rev. Naina Hazel. Ruddy Boles; [facility], to contact [phone] [address] call: [phone]

## 2019-11-01 NOTE — PROGRESS NOTES
Cardiology Progress Note 
2019     Admit Date: 10/27/2019 Admit Diagnosis: Sepsis ([facility] [address] [A41.9]  CC: none currently Assessment:  
Active Problems: * No active hospital problems. * 
 
Plan:  
 
Echo showed consistent LVEF from before Decreased dose of ACEi for today, can be DC ed from cardiac perspective Subjective:   
 
Carlos Alberto Colon has no cardiac c/o Objective:  
 Physical Exam: 
Overall VSSAF;   
Visit Vitals /75 Pulse 89 Temp 98.5 °F (36.9 °C) Resp 18 Ht 6' (1.829 m) Wt 96.6 kg (212 lb 14.4 oz) SpO2 94% BMI 28.87 kg/m² Temp (24hrs), Av.1 °F (36.7 °C), Min:97.6 °F (36.4 °C), Max:98.5 °F (36.9 °C) Patient Vitals for the past 8 hrs: 
 Pulse 19 0833 89  
19 0320 89 Patient Vitals for the past 8 hrs: 
 Resp 19 0833 18  
19 0320 18 Patient Vitals for the past 8 hrs: 
 BP  
19 0833 119/75  
19 0320 132/79 No intake/output data recorded. General Appearance: Well developed, well nourished, no acute distress. Ears/Nose/Mouth/Throat:   Normal MM; anicteric. JVP: WNL Resp:   Lungs clear to auscultation bilaterally. Nl resp effort. Cardiovascular:  RRR, S1, S2 normal, no new murmur. No gallop or rub. Abdomen:   Soft, non-tender, bowel sounds are present. Extremities: No edema bilaterally. Skin: 
Neuro: Warm and dry. A/O x3, grossly nonfocal  
                     
Data Review:    
Telemetry independently reviewed :   normal sinus rhythm Labs:  
Recent Results (from the past 24 hour(s)) CBC WITH AUTOMATED DIFF Collection Time: 19  3:31 AM  
Result Value Ref Range WBC 3.7 (L) 4.1 - 11.1 K/uL  
 RBC 3.67 (L) 4.10 - 5.70 M/uL  
 HGB 11.4 (L) 12.1 - 17.0 g/dL HCT 33.7 (L) 36.6 - 50.3 % MCV 91.8 80.0 - 99.0 FL  
 MCH 31.1 26.0 - 34.0 PG  
 MCHC 33.8 30.0 - 36.5 g/dL  
 RDW 15.2 (H) 11.5 - 14.5 % PLATELET 58 (L) 017 - 400 K/uL  MPV 10.4 8.9 - 12.9 FL  
 NRBC 0.0 0  WBC ABSOLUTE NRBC 0.00 0.00 - 0.01 K/uL NEUTROPHILS 57 32 - 75 % LYMPHOCYTES 30 12 - 49 % MONOCYTES 10 5 - 13 % EOSINOPHILS 2 0 - 7 % BASOPHILS 1 0 - 1 % IMMATURE GRANULOCYTES 0 0.0 - 0.5 % ABS. NEUTROPHILS 2.2 1.8 - 8.0 K/UL  
 ABS. LYMPHOCYTES 1.1 0.8 - 3.5 K/UL  
 ABS. MONOCYTES 0.4 0.0 - 1.0 K/UL  
 ABS. EOSINOPHILS 0.1 0.0 - 0.4 K/UL  
 ABS. BASOPHILS 0.0 0.0 - 0.1 K/UL  
 ABS. IMM. GRANS. 0.0 0.00 - 0.04 K/UL  
 DF AUTOMATED METABOLIC PANEL, BASIC Collection Time: 11/01/19  3:31 AM  
Result Value Ref Range Sodium 140 136 - 145 mmol/L Potassium 4.1 3.5 - 5.1 mmol/L Chloride 110 (H) 97 - 108 mmol/L  
 CO2 25 21 - 32 mmol/L Anion gap 5 5 - 15 mmol/L Glucose 88 65 - 100 mg/dL BUN 15 6 - 20 MG/DL Creatinine 1.03 0.70 - 1.30 MG/DL  
 BUN/Creatinine ratio 15 12 - 20 GFR est AA >60 >60 ml/min/1.73m2 GFR est non-AA >60 >60 ml/min/1.73m2 Calcium 8.6 8.5 - 10.1 MG/DL Current medications reviewed Ashly De Anda MD

## 2019-11-01 NOTE — DISCHARGE SUMMARY
Discharge Summary PATIENT ID: Luiz Ballesteros MRN: [id number] YOB: 1929 DATE OF ADMISSION: 10/27/2019  9:30 PM   
DATE OF DISCHARGE: 11/1/2019 PRIMARY CARE PROVIDER: Barbara Pacheco MD  
 
ATTENDING PHYSICIAN: Santos Wall MD  
DISCHARGING PROVIDER: Santos Wall MD   
To contact this individual call [phone] and ask the  to page. If unavailable ask to be transferred the Adult Hospitalist Department. CONSULTATIONS: IP CONSULT TO CARDIOLOGY 
IP CONSULT TO GENERAL SURGERY 
 
PROCEDURES/SURGERIES: * No surgery found * [address] COURSE:  
 
Pt with quick resoluiton of sir/septic picute, neg cult on Zosyn vano minitailly cnanged to cleocin on discharge; pt has likely source: lt leg lac/cellulitis site; pt to follow up w usual care team and dermatology Marco Cohen;  
surg Porter Yanes MD  
Physician General Surgery Consults Signed Date of Service:  10/31/19 1722 []Hide copied text []Hover for details Asked to see patient for leg wound on the left. These were evaluated by wound care this am. There is a 2.2x.5 cm scab- This can be left open to air. THere is a 1x1 cm raised area- This does not look infected. This has been present for 2 months. This does not require intervention while he is in the hospital as he already has an appointment to have this  addressed by his dermatologist upon discharge. Call if questions Per card: 
 
Plan:  
  
Echo showed consistent LVEF from before Decreased dose of ACEi for today, can be DC ed from cardiac perspective ECHO: 
 
Result status: Final result · Left Ventricle: Normal cavity size, wall thickness and diastolic function. Moderate global systolic dysfunction. Estimated left ventricular ejection fraction is 41 - 45%. No regional wall motion abnormality noted. Normal left ventricular strain. · Left Atrium: Moderately dilated left atrium. · Right Ventricle: Not well visualized. · Interatrial Septum: Color flow Doppler was used. · Pulmonary Artery: There is no evidence of pulmonary hypertension. · Mitral Valve: Mitral valve thickening. Mild to moderate mitral valve prolapse of the anterior leaflet and posterior leaflet. Late systolic mitral valve prolapse. Severe mitral valve regurgitation is present Per PN hospitalist: 
 
Assessment / Plan: 1.  Cellulitis, left lower extremity - cont iv vancomycin and zosyn. - changed to cleocin as platelets drop and else for de-escalation of rx  
1.a. Lesion lt leg see surg note above. 2.  Hypertension - stable with minimal intervention. BP Readings from Last 1 Encounters:  
10/31/19 114/67 3.  Dyslipidemia - cont zocor. 4.  Chronic thrombocytopenia - rpt cbc today. no heparin at this time. Worst likely in setting of zosyn, should return to baseline in time. 5.  Coronary artery disease, status post CABG - seen by cardiology. No intervention at this time. 6.  Sepsis - resolving on iv abx. Resolved abx down adjusted. - discharged on Cleocin 7.  Status post pacemaker insertion.  Continue to monitor. 8. Other Issues:  Code Status:  The patient is a full code.  SCD for DVT prophylaxis. Low platelets: 
 
Results for Brayan Escalona (MRN [id number]) as of 11/1/2019 09:57 Ref. Range 10/28/2019 03:05 11/1/2019 03:31 WBC Latest Ref Range: 4.1 - 11.1 K/uL 3.9 (L) 3.7 (L) NRBC Latest Ref Range: 0  WBC 0.0 0.0  
RBC Latest Ref Range: 4.10 - 5.70 M/uL 3.67 (L) 3.67 (L) HGB Latest Ref Range: 12.1 - 17.0 g/dL 11.5 (L) 11.4 (L) HCT Latest Ref Range: 36.6 - 50.3 % 34.6 (L) 33.7 (L) MCV Latest Ref Range: 80.0 - 99.0 FL 94.3 91.8 MCH Latest Ref Range: 26.0 - 34.0 PG 31.3 31.1 MCHC Latest Ref Range: 30.0 - 36.5 g/dL 33.2 33.8 RDW Latest Ref Range: 11.5 - 14.5 % 15.1 (H) 15.2 (H) PLATELET Latest Ref Range: 150 - 400 K/uL 51 (L) 58 (L) MPV Latest Ref Range: 8.9 - 12.9 FL 10.6 10.4  
 
  
 consult ptot and likely home tomorrow if plts stable. 
  
25.0 - 29.9 Overweight / Body mass index is 28.58 kg/m². 
  
   
  
 
  
 
 
 
DISCHARGE DIAGNOSES / PLAN:   
 
1.  as above ADDITIONAL CARE RECOMMENDATIONS:  
na  
 
PENDING TEST RESULTS:  
At the time of discharge the following test results are still pending: na 
 
FOLLOW UP APPOINTMENTS:   
Follow-up Information Follow up With Specialties Details Why Contact Info Neli Pollock MD Internal Medicine In 1 week  [address] [facility] [address] [address] 
[phone] Neli Pollock MD Internal Medicine   [address] [address] [phone] Uriel Aguirre MD Cardiology  as directed  [address] [facility] [address] [place] [address] 
[phone] DIET: Resume previous diet ACTIVITY: Activity as tolerated WOUND CARE: na 
 
EQUIPMENT needed: na 
 
 
DISCHARGE MEDICATIONS: 
Current Discharge Medication List  
  
START taking these medications Details  
clindamycin (CLEOCIN HCL) 300 mg capsule Take 1 Cap by mouth three (3) times daily. Qty: 12 Cap, Refills: 0 CONTINUE these medications which have CHANGED Details  
furosemide (LASIX) 20 mg tablet Take 1 Tab by mouth every other day. Qty: 15 Tab, Refills: 3 CONTINUE these medications which have NOT CHANGED Details  
finasteride (PROSCAR) 5 mg tablet Take 5 mg by mouth daily. tamsulosin (FLOMAX) 0.4 mg capsule Take 0.4 mg by mouth daily. gabapentin (NEURONTIN) 300 mg capsule Take 300 mg by mouth three (3) times daily. simvastatin (ZOCOR) 20 mg tablet Take 20 mg by mouth nightly. enalapril (VASOTEC) 5 mg tablet Take 5 mg by mouth daily. metoprolol succinate (TOPROL-XL) 25 mg XL tablet Take 12.5 mg by mouth daily. NOTIFY YOUR PHYSICIAN FOR ANY OF THE FOLLOWING:  
Fever over 101 degrees for 24 hours. Chest pain, shortness of breath, fever, chills, nausea, vomiting, diarrhea, change in mentation, falling, weakness, bleeding. Severe pain or pain not relieved by medications. Or, any other signs or symptoms that you may have questions about. DISPOSITION: 
  Home With: 
 OT  PT  HH  RN  
  
 Long term SNF/Inpatient Rehab  
x Independent/assisted living Hospice Other:  
 
 
PATIENT CONDITION AT DISCHARGE:  
 
Functional status Poor Deconditioned   
x Independent Cognition Lucid   
x Forgetful Dementia Catheters/lines (plus indication) Reyes PICC   
 PEG   
x None Code status  
x  Full code DNR   
 
PHYSICAL EXAMINATION AT DISCHARGE: 
General:          Alert, cooperative, no distress, appears stated age. HEENT:           Atraumatic, anicteric sclerae, pink conjunctivae No oral ulcers, mucosa moist, throat clear, dentition fair Neck:               Supple, symmetrical 
Lungs:             Clear to auscultation bilaterally. No Wheezing or Rhonchi. No rales. Chest wall:      No tenderness  No Accessory muscle use. Heart:              Regular  rhythm,  No  murmur   No edema Abdomen:        Soft, non-tender. Not distended. Bowel sounds normal 
Extremities:     No cyanosis. No clubbing,   
                        Skin turgor normal, Capillary refill normal 
Skin:                Not pale. Not Jaundiced  No rashes Psych:             Not anxious or agitated. Neurologic:      Alert, moves all extremities, answers questions appropriately and responds to commands CHRONIC MEDICAL DIAGNOSES: 
Problem List as of 11/1/2019 Date Reviewed: 10/30/2019 Codes Class Noted - Resolved Hematuria ICD-10-CM: R31.9 ICD-9-CM: 599.70  12/30/2015 - Present Fecal impaction [facility]) ICD-10-CM: K56.41 
ICD-9-CM: 560.32  12/27/2015 - Present Hypertension ICD-10-CM: I10 
ICD-9-CM: 401.9  12/27/2015 - Present Urinary retention ICD-10-CM: R33.9 ICD-9-CM: 788.20  12/27/2015 - Present Rectal bleed ICD-10-CM: K62.5 ICD-9-CM: 569.3  7/19/2015 - Present Lower GI bleed ICD-10-CM: K92.2 ICD-9-CM: 578.9  7/18/2015 - Present Nausea & vomiting ICD-10-CM: R11.2 ICD-9-CM: 787.01  11/25/2010 - Present * (Principal) [facility]OLVED: [facility] ICD-10-CM: A41.9 ICD-9-CM: 038.9, 995.91  10/28/2019 - 10/31/2019 Greater than  30  minutes were spent with the patient on counseling and coordination of care Signed:  
Martha Ramos MD 
11/1/2019 
9:55 AM

## 2019-11-01 NOTE — PROGRESS NOTES
Hospital follow-up visit has been scheduled with [facility] for Saturday, 11/3/19. Office will contact patient prior to arrival.  Michael Garcia, Care Management Specialist. 
 
Hospital follow-up PCP transitional care appointment has been scheduled with Dr. Jozef Cisneros for Monday, 18/65/44 at 3:20 p.m  Pending patient discharge.   Michael Garcia, Care Management Specialist.

## 2019-12-19 PROBLEM — I95.9 HYPOTENSION: Status: ACTIVE | Noted: 2019-01-01

## 2019-12-19 NOTE — ED NOTES
Bedside and Verbal shift change report given to Renetta Dwyer RN (oncoming nurse) by Elizabeth Turner RN (offgoing nurse). Report included the following information SBAR, ED Summary, MAR and Recent Results.

## 2019-12-19 NOTE — ED TRIAGE NOTES
Triage note : pt arrives ambulatory ( with walker )  from home with  c/o hypotension . Pt reports falling today 3 times . Pt was not using his walker when he fell 2 out of the 3 times  . Pt denies hitting head and loc. Pt has abrasion on right hand . Pt is alert self , situation , and place . Pt does c/o of dyspnea at rest but is baseline due to copd . No blood thinners and no home oxygen  , pt also has baseline swelling in bilateral legs for 6 months . Pt has a pacemaker . pts son reports patients bp at doctor today was 110/60 . He also checked his blood sugar after giving him orange juice and it was 147 -pt is not diabetic

## 2019-12-19 NOTE — H&P
[address] [facility] Adult  Hospitalist Group History and Physical 
 
Primary Care Provider: Araceli Corey MD 
 
Subjective:  
 
Khris Fuller is a 80 y.o. male with congestive heart failure reduced ejection fraction, HTN, atrial fibrillation presents to the ER after he had a fall at home. Patient states that he was walking to his house when he tripped however his son states patient became  Weak and let himself fall down to the floor. Patient denies any symptoms such as dizziness, lightheadedness, tunnel vision headache, nausea or diaphoresis. Again once inside the house he was found on the floor by the son after another fall. After that fall his son checked patient blood pressure and he was found to have a systolic bp in the 30K prompting him to the ER. Patient denies head trauma. Son states that since his father was discharged from the hospital in October 2019 after being treated for cellulitis his has been more tired and less active. Patient reports increased shortness of breath in the last few months, less energy and worsening LE edema. He has been following closely with his cardiologist Dr. Jean Hutton who has been adjusting his diuretics. He has been on Bumex 1mg daily for several weeks now but today it was changed to lasix 80mg PO. He did not take lasix today. Also, he was started on xarelto for afib today. Patient denies any other symptom such as orthopnea, PND but does report early satiety and decreased PO intake. On arrival to the ER he was found to be hypotensive with bp in the 80/60s and having renal dysfunction as well. He was given 500ml IV fluids bolus with improvement of his blood pressure. Review of Systems: 
 
General: HPI, no fever, reports weight gain (unknown amount). Reports generalized weakness. HEENT: no headache, no vision changes, no nose discharge, no hearing changes RES: no wheezing, no cough. Reports intermittent sob and BO. CVS: no cp, no palpitation. No orthopnea or PND. Reports bilateral LE edema. Muscular: no joint swelling, no muscle pain. Skin: reports pruritic rash on abdomen and back. GI: no vomiting, no diarrhea : no dysuria, no hematuria Hemo: no gum bleeding, no petechial  
Neuro:no focal weakness, no sensory disturbance. No  
Endo: no polydipsia Psych: denied depression Past Medical History:  
Diagnosis Date  Acute MI ([facility] [address]  Cancer ([facility] [address] Skin  Chronic obstructive pulmonary disease ([facility] [address]  Hypertension  Pacemaker 06/18/2013 Medtronic Pacer/Defibrillator that is not MRI compatible per [employer]. Past Surgical History:  
Procedure Laterality Date  CARDIAC SURG PROCEDURE UNLIST    
 cabg x 3  
 HX ORTHOPAEDIC    
 shoulder, hand and back surgery  HX PACEMAKER Prior to Admission medications Medication Sig Start Date End Date Taking? Authorizing Provider  
clindamycin (CLEOCIN HCL) 300 mg capsule Take 1 Cap by mouth three (3) times daily. 11/1/19   Silvano De La Fuente MD  
furosemide (LASIX) 20 mg tablet Take 1 Tab by mouth every other day. 11/1/19   Silvano De La Fuente MD  
finasteride (PROSCAR) 5 mg tablet Take 5 mg by mouth daily. Provider, Historical  
tamsulosin (FLOMAX) 0.4 mg capsule Take 0.4 mg by mouth daily. Provider, Historical  
gabapentin (NEURONTIN) 300 mg capsule Take 300 mg by mouth three (3) times daily. Provider, Historical  
enalapril (VASOTEC) 5 mg tablet Take 5 mg by mouth daily. Provider, Historical  
metoprolol succinate (TOPROL-XL) 25 mg XL tablet Take 12.5 mg by mouth daily. Provider, Historical  
simvastatin (ZOCOR) 20 mg tablet Take 20 mg by mouth nightly. Other, MD Ángela  
 
Allergies Allergen Reactions  Prednisone Other (comments) Bloats him History reviewed. No pertinent family history. SOCIAL HISTORY: 
Patient resides at home with his son. Patient ambulation is limited due to deconditioning.  Needs assistance at this time. Smoking history: Former smoker Alcohol history: None Objective:  
 
 
Physical Exam:  
Patient Vitals for the past 12 hrs: 
 Pulse Resp BP SpO2  
12/19/19 0115 71 13 (!) 82/57 97 % 12/19/19 0100 76 15 (!) 82/65 100 % 12/19/19 0000 75 15 (!) 79/61 98 % 12/18/19 2353 75  97/65   
12/18/19 2300 71 13 (!) 82/63 96 % 12/18/19 2122 75  93/64   
12/18/19 2121 76 16 (!) 81/61 97 % GEN APPEARANCE: Patient resting in bed in NAD HEENT: Conjunctiva Clear. Dry oral mucosa. CVS: RRR, 2/6 systolic murmur. LUNGS: mild crackles at the bases ABD: Soft; No TTP; + Normoactive BS 
EXT: WWP, no edema Skin exam: erythematous patch on left side of the abdomen and back. Area non tender to palpation. MENTAL STATUS: Answers questions appropriately, responds to commands but appears confused at times. Cranial nerve exam: EOMI, CN V sensory/motor intact, no facial asymmetry noted, patient able to hearl, uvula midline, able to move tongue left/right. No gross motor or sensory deficits Data Review:  
Recent Results (from the past 24 hour(s)) SAMPLES BEING HELD Collection Time: 12/18/19 10:31 PM  
Result Value Ref Range SAMPLES BEING HELD 1BLUE   
 COMMENT Add-on orders for these samples will be processed based on acceptable specimen integrity and analyte stability, which may vary by analyte. CBC WITH AUTOMATED DIFF Collection Time: 12/18/19 10:31 PM  
Result Value Ref Range WBC 4.7 4.1 - 11.1 K/uL  
 RBC 3.93 (L) 4.10 - 5.70 M/uL  
 HGB 12.5 12.1 - 17.0 g/dL HCT 38.0 36.6 - 50.3 % MCV 96.7 80.0 - 99.0 FL  
 MCH 31.8 26.0 - 34.0 PG  
 MCHC 32.9 30.0 - 36.5 g/dL  
 RDW 19.1 (H) 11.5 - 14.5 % PLATELET 76 (L) 480 - 400 K/uL MPV 10.2 8.9 - 12.9 FL  
 NRBC 0.0 0  WBC ABSOLUTE NRBC 0.00 0.00 - 0.01 K/uL NEUTROPHILS 67 32 - 75 % LYMPHOCYTES 18 12 - 49 % MONOCYTES 8 5 - 13 % EOSINOPHILS 7 0 - 7 % BASOPHILS 1 0 - 1 % IMMATURE GRANULOCYTES 0 0.0 - 0.5 % ABS. NEUTROPHILS 3.1 1.8 - 8.0 K/UL  
 ABS. LYMPHOCYTES 0.8 0.8 - 3.5 K/UL  
 ABS. MONOCYTES 0.4 0.0 - 1.0 K/UL  
 ABS. EOSINOPHILS 0.3 0.0 - 0.4 K/UL  
 ABS. BASOPHILS 0.0 0.0 - 0.1 K/UL  
 ABS. IMM. GRANS. 0.0 0.00 - 0.04 K/UL  
 DF AUTOMATED METABOLIC PANEL, COMPREHENSIVE Collection Time: 12/18/19 10:31 PM  
Result Value Ref Range Sodium 141 136 - 145 mmol/L Potassium 4.5 3.5 - 5.1 mmol/L Chloride 105 97 - 108 mmol/L  
 CO2 29 21 - 32 mmol/L Anion gap 7 5 - 15 mmol/L Glucose 95 65 - 100 mg/dL BUN 38 (H) 6 - 20 MG/DL Creatinine 2.35 (H) 0.70 - 1.30 MG/DL  
 BUN/Creatinine ratio 16 12 - 20 GFR est AA 32 (L) >60 ml/min/1.73m2 GFR est non-AA 26 (L) >60 ml/min/1.73m2 Calcium 9.2 8.5 - 10.1 MG/DL Bilirubin, total 1.4 (H) 0.2 - 1.0 MG/DL  
 ALT (SGPT) 22 12 - 78 U/L  
 AST (SGOT) 31 15 - 37 U/L Alk. phosphatase 98 45 - 117 U/L Protein, total 7.2 6.4 - 8.2 g/dL Albumin 3.3 (L) 3.5 - 5.0 g/dL Globulin 3.9 2.0 - 4.0 g/dL A-G Ratio 0.8 (L) 1.1 - 2.2    
TROPONIN I Collection Time: 12/18/19 10:31 PM  
Result Value Ref Range Troponin-I, Qt. 0.07 (H) <0.05 ng/mL NT-PRO BNP Collection Time: 12/18/19 10:31 PM  
Result Value Ref Range NT pro-BNP 10,537 (H) <450 PG/ML  
EKG, 12 LEAD, INITIAL Collection Time: 12/18/19 11:45 PM  
Result Value Ref Range Ventricular Rate 72 BPM  
 Atrial Rate 70 BPM  
 QRS Duration 84 ms Q-T Interval 420 ms QTC Calculation (Bezet) 459 ms Calculated R Axis 14 degrees Calculated T Axis 162 degrees Diagnosis Ventricular-paced rhythm When compared with ECG of 27-OCT-2019 22:10, 
Electronic ventricular pacemaker has replaced Sinus rhythm Xr Chest [facility] Result Date: 12/19/2019 IMPRESSION: Small bilateral effusions, right greater than left, without change. Assessment:  
 
Active Problems: Hypotension (12/19/2019) Plan: 1. Hypotension- likely intravascular depletion caused by combination of antihypertensive medication and diuretics vs possible cardiogenic shock however patient fluid responsive. - Will try gentle IV fluids at this time with NS at 75ml/hr. 
- Monitor I&Os - Keep MAP >65. 
- will start dopamine if persistent hypotension. 
- h/o hypertension. Will hold ace inh, metoprolol. 2. Acute kidney injury- likely prerenal etiology 
- Gentle IV fluids - Monitor renal function. 3. HF NYHA class 3- h/o HF with EF 35% as per report. noted to have edema on exam and c/o SOB but respiratory status at baseline. Worsening probnp.  
- hold diuretics for now given hypotension and renal failure. - Strict I&Os 
- Daily weight - Cardiology consult. 4. Atrial fibrillation- rate controlled. Started today on xarelto by cardiologist for upcoming ablation scheduled for January 2019. Continue with xarelto for now 5. Elevated Troponin- in the setting of renal dysfunction. Could be precipitated by HF exac. No chest pain at this time. - trend ce 
- Hold ac for now. 6. Rash- low suspicion for cellulitis. At this time appears more fungal. Will try topical antifungal medications. DVT prophy: on xarelto Code status: DNR/DNI Surrogate decision maker: Son Stormy Bernheim [phone]. I had long conversation >16 minutes with patient'sson regarding medical conditions, prognosis and code status. All questions answered. Patient is DNR [place] . Disposition: Intermediate level of care. Low threshold for ICU transfer due to hypotension. FUNCTIONAL STATUS PRIOR TO HOSPITALIZATION limited ambulation. Signed By: Jesus Khan MD   
 December 19, 2019

## 2019-12-19 NOTE — PROGRESS NOTES
Lawson Yanez is a 80 y.o. male with congestive heart failure reduced ejection fraction, HTN, atrial fibrillation presents to the ER after he had a fall at home. Patient states that he was walking to his house when he tripped however his son states patient became weak and let himself fall down to the floor Hypotension - persistent  
- likely intravascular depletion caused by combination of antihypertensive medication and diuretics vs possible cardiogenic shock however patient fluid responsive. 
- dced IVF 
- Monitor I&Os - Keep MAP >65. 
- discussed with cardiology Dr. Pham Agrawal. Started on dobutamine gtt Acute kidney injury- likely prerenal etiology 
- Gentle IV fluids - low UOP 
- nephrology consulted 
- renal us ordered - Monitor renal function. 
  
CHF NYHA class 3 on poa 
-  noted to have 3+ edema on exam  
- probnp >10,000 
- CXR: Small bilateral effusions, right greater than left, without change. - hold diuretics for now given hypotension and renal failure. - Strict I&Os, Daily weight - Cardiology consult pending 
- echo done at cardiology office yesterday   
  
Elevated Troponin 
- in the setting of renal dysfunction. Could be precipitated by HF exac 
- . No chest pain at this time. - troponin flat Rash  - worsening ? Unclear etiology - pt c/o itching 
- started on benadryl and IV setroids 
  
DNR Discussed with son at bedside. He understands that father is at high risk for deterioration. Son inclining towards comfort measures if his father does not improve. Palliative care consulted

## 2019-12-19 NOTE — CONSULTS
[facility] 
 [address] [facility], [address] [facility] Phone: [phone] NOTE Patient: Leonardo Littlejohn MRN: [id number]  PCP: Shari Tucker MD  
:     1929  Age:   80 y.o. Sex:  male Referring physician: No att. providers found Reason for consultation: 80 y.o. male with Hypotension [L40.8] complicated by ASHLYN Admission Date: 2019 10:05 PM  LOS: 0 days ASSESSMENT and PLAN :  
ASHLYN:  
- pre renal azotemia from volume depletion vs Cardiorenal syndrome - Possible ATN from Hypotension and IV volume depletion - No obstruction on Imaging  
- signs of apparent fluid overload  
- His baseline Cardiac function is unknown at this time and echo pending  
- hold off on IVF  
- Dobutamine per cardiology  
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  
- Hx of CABG + re-do, PCI x 5 - Chronic Systolic CHF  
- Chronic A fib Skin Rash : 
- ? Etiology Chronic Thrombocytopenia Leucopenia Anemia DNR Care Plan discussed with:  Pt`s son Thank you for consulting [facility] in the care of your patient. Subjective: HPI: Leonardo Littlejohn is a 80 y.o.  male who has been admitted to the hospital for fall and Hypotension. Mr Dillon Posey was previously followed by Dr Michael Petersen and now Dr Huong Dos Santos ,. Extensive cardiac hx and hx of CABG twice and multiple PCI. He also had afib and has PPM in place. He has developed worsening LE edema lately. Until then he was even playing Golf. He has develop progressively more edema  In his legs and SOB. He was switchwed from lasix to Bumex to Lasix He developed hYpotension and fell and presented to ER Found to have ARF for which we were asked to see him No prior renal issues Kidney US showed R [place] Known to have BPH Reduced UOP per son lately No fever Has developed rash on his abdomen and back in the last 2 days Past Medical Hx:  
Past Medical History:  
Diagnosis Date  Acute MI ([facility] [address]  Atrial fibrillation ([facility] [address]  BPH (benign prostatic hyperplasia)  Cancer ([facility] [address] Skin  Chronic obstructive pulmonary disease ([facility] [address]  Heart failure ([facility] [address]  Hypertension  Pacemaker 06/18/2013 [employer] Pacer/Defibrillator that is not MRI compatible per [employer]. Past Surgical Hx: 
  
Past Surgical History:  
Procedure Laterality Date  CARDIAC SURG PROCEDURE UNLIST    
 cabg x 3  
 HX ORTHOPAEDIC    
 shoulder, hand and back surgery  HX PACEMAKER Allergies Allergen Reactions  Prednisone Other (comments) Bloats him Social Hx:  reports that he quit smoking about 35 years ago. He has a 20.00 pack-year smoking history. He has never used smokeless tobacco. He reports current alcohol use of about 5.8 standard drinks of alcohol per week. He reports that he does not use drugs. History reviewed. No pertinent family history. Review of Systems: A thorough twelve point review of system was performed today. Pertinent positives and negatives are mentioned in the HPI. The reminder of the ROS is negative and noncontributory. Objective:   
Vitals:   
Vitals:  
 12/19/19 1500 12/19/19 1519 12/19/19 1530 12/19/19 1600 BP: (!) 80/60 (!) 80/60 90/62 (!) 87/66 Pulse: 71  70 75 Resp: 17  18 14 Temp:      
SpO2: 95%  95% 94% Weight:      
Height:      
 
I&O's:  12/18 0701 - 12/19 0700 In: 1000 [I.V.:1000] Out: 412 [Urine:412] Visit Vitals BP (!) 87/66 Pulse 75 Temp 97.8 °F (36.6 °C) Resp 14 Ht 5' 10\" (1.778 m) Wt 99.9 kg (220 lb 3.8 oz) SpO2 94% BMI 31.60 kg/m² Physical Exam: 
General:  Ill looking HEENT: pale, and anicteric Neck: JVD + Lungs : diminished at bases , r worse than left CVS: irregular Abdomen: Soft, NT, BS +, ventral hernia + Extremities: 3+ Edema, discoloration Skin:rash +  
MS: No joint swelling, erythema, warmth Neurologic: non focal, AAO x 3 Psych:  Unable to assess Laboratory Results: 
 
Recent Labs 12/19/19 
0336 12/18/19 2231  141  
K 4.3 4.5  
 105 CO2 27 29 GLU 94 95 BUN 39* 38* CREA 2.21* 2.35* CA 8.6 9.2 MG 2.1  --   
ALB  --  3.3* SGOT  --  31 ALT  --  22 Recent Labs 12/19/19 0336 12/18/19 2231 WBC 3.6* 4.7 HGB 11.4* 12.5 HCT 33.7* 38.0  
PLT 69* 76* No results found for: SDES Lab Results Component Value Date/Time Culture result: NO GROWTH 5 DAYS 10/27/2019 10:01 PM  
 Culture result: NO GROWTH 1 DAY 01/02/2016 01:31 PM  
 Culture result: MRSA NOT PRESENT 12/27/2015 10:31 PM  
 Culture result:  12/27/2015 10:31 PM  
      Screening of patient nares for MRSA is for surveillance purposes and, if positive, to facilitate isolation considerations in high risk settings. It is not intended for automatic decolonization interventions per se as regimens are not sufficiently effective to warrant routine use. Recent Results (from the past 24 hour(s)) SAMPLES BEING HELD Collection Time: 12/18/19 10:31 PM  
Result Value Ref Range SAMPLES BEING HELD 1BLUE   
 COMMENT Add-on orders for these samples will be processed based on acceptable specimen integrity and analyte stability, which may vary by analyte. CBC WITH AUTOMATED DIFF Collection Time: 12/18/19 10:31 PM  
Result Value Ref Range WBC 4.7 4.1 - 11.1 K/uL  
 RBC 3.93 (L) 4.10 - 5.70 M/uL  
 HGB 12.5 12.1 - 17.0 g/dL HCT 38.0 36.6 - 50.3 % MCV 96.7 80.0 - 99.0 FL  
 MCH 31.8 26.0 - 34.0 PG  
 MCHC 32.9 30.0 - 36.5 g/dL  
 RDW 19.1 (H) 11.5 - 14.5 % PLATELET 76 (L) 177 - 400 K/uL MPV 10.2 8.9 - 12.9 FL  
 NRBC 0.0 0  WBC ABSOLUTE NRBC 0.00 0.00 - 0.01 K/uL NEUTROPHILS 67 32 - 75 % LYMPHOCYTES 18 12 - 49 % MONOCYTES 8 5 - 13 % EOSINOPHILS 7 0 - 7 % BASOPHILS 1 0 - 1 % IMMATURE GRANULOCYTES 0 0.0 - 0.5 % ABS. NEUTROPHILS 3.1 1.8 - 8.0 K/UL  
 ABS. LYMPHOCYTES 0.8 0.8 - 3.5 K/UL  
 ABS. MONOCYTES 0.4 0.0 - 1.0 K/UL  
 ABS. EOSINOPHILS 0.3 0.0 - 0.4 K/UL  
 ABS. BASOPHILS 0.0 0.0 - 0.1 K/UL  
 ABS. IMM. GRANS. 0.0 0.00 - 0.04 K/UL  
 DF AUTOMATED METABOLIC PANEL, COMPREHENSIVE Collection Time: 12/18/19 10:31 PM  
Result Value Ref Range Sodium 141 136 - 145 mmol/L Potassium 4.5 3.5 - 5.1 mmol/L Chloride 105 97 - 108 mmol/L  
 CO2 29 21 - 32 mmol/L Anion gap 7 5 - 15 mmol/L Glucose 95 65 - 100 mg/dL BUN 38 (H) 6 - 20 MG/DL Creatinine 2.35 (H) 0.70 - 1.30 MG/DL  
 BUN/Creatinine ratio 16 12 - 20 GFR est AA 32 (L) >60 ml/min/1.73m2 GFR est non-AA 26 (L) >60 ml/min/1.73m2 Calcium 9.2 8.5 - 10.1 MG/DL Bilirubin, total 1.4 (H) 0.2 - 1.0 MG/DL  
 ALT (SGPT) 22 12 - 78 U/L  
 AST (SGOT) 31 15 - 37 U/L Alk. phosphatase 98 45 - 117 U/L Protein, total 7.2 6.4 - 8.2 g/dL Albumin 3.3 (L) 3.5 - 5.0 g/dL Globulin 3.9 2.0 - 4.0 g/dL A-G Ratio 0.8 (L) 1.1 - 2.2    
TROPONIN I Collection Time: 12/18/19 10:31 PM  
Result Value Ref Range Troponin-I, Qt. 0.07 (H) <0.05 ng/mL NT-PRO BNP Collection Time: 12/18/19 10:31 PM  
Result Value Ref Range NT pro-BNP 10,537 (H) <450 PG/ML  
EKG, 12 LEAD, INITIAL Collection Time: 12/18/19 11:45 PM  
Result Value Ref Range Ventricular Rate 72 BPM  
 Atrial Rate 70 BPM  
 QRS Duration 84 ms Q-T Interval 420 ms QTC Calculation (Bezet) 459 ms Calculated R Axis 14 degrees Calculated T Axis 162 degrees Diagnosis Ventricular-paced rhythm with occasional premature atrial complexes When compared with ECG of 27-OCT-2019 22:10, 
Electronic ventricular pacemaker has replaced Sinus rhythm Confirmed by Cirilo Campos M.D., Role Constant ([id number]) on 12/19/2019 11:16:35 AM 
  
URINALYSIS W/MICROSCOPIC Collection Time: 12/19/19  3:36 AM  
Result Value Ref Range Color DARK YELLOW Appearance CLEAR CLEAR Specific gravity 1.020 1.003 - 1.030    
 pH (UA) 5.0 5.0 - 8.0 Protein 30 (A) NEG mg/dL Glucose NEGATIVE  NEG mg/dL Ketone TRACE (A) NEG mg/dL Bilirubin NEGATIVE  NEG Blood MODERATE (A) NEG Urobilinogen 1.0 0.2 - 1.0 EU/dL Nitrites NEGATIVE  NEG Leukocyte Esterase TRACE (A) NEG    
 WBC 5-10 0 - 4 /hpf  
 RBC 0-5 0 - 5 /hpf Epithelial cells FEW FEW /lpf Bacteria NEGATIVE  NEG /hpf Hyaline cast 0-2 0 - 5 /lpf URINE CULTURE HOLD SAMPLE Collection Time: 12/19/19  3:36 AM  
Result Value Ref Range Urine culture hold URINE ON HOLD IN MICROBIOLOGY DEPT FOR 3 DAYS. IF UNPRESERVED URINE IS SUBMITTED, IT CANNOT BE USED FOR ADDITIONAL TESTING AFTER 24 HRS, RECOLLECTION WILL BE REQUIRED. METABOLIC PANEL, BASIC Collection Time: 12/19/19  3:36 AM  
Result Value Ref Range Sodium 140 136 - 145 mmol/L Potassium 4.3 3.5 - 5.1 mmol/L Chloride 107 97 - 108 mmol/L  
 CO2 27 21 - 32 mmol/L Anion gap 6 5 - 15 mmol/L Glucose 94 65 - 100 mg/dL BUN 39 (H) 6 - 20 MG/DL Creatinine 2.21 (H) 0.70 - 1.30 MG/DL  
 BUN/Creatinine ratio 18 12 - 20 GFR est AA 34 (L) >60 ml/min/1.73m2 GFR est non-AA 28 (L) >60 ml/min/1.73m2 Calcium 8.6 8.5 - 10.1 MG/DL MAGNESIUM Collection Time: 12/19/19  3:36 AM  
Result Value Ref Range Magnesium 2.1 1.6 - 2.4 mg/dL CBC W/O DIFF Collection Time: 12/19/19  3:36 AM  
Result Value Ref Range WBC 3.6 (L) 4.1 - 11.1 K/uL  
 RBC 3.57 (L) 4.10 - 5.70 M/uL  
 HGB 11.4 (L) 12.1 - 17.0 g/dL HCT 33.7 (L) 36.6 - 50.3 % MCV 94.4 80.0 - 99.0 FL  
 MCH 31.9 26.0 - 34.0 PG  
 MCHC 33.8 30.0 - 36.5 g/dL  
 RDW 18.6 (H) 11.5 - 14.5 % PLATELET 69 (L) 497 - 400 K/uL MPV 10.7 8.9 - 12.9 FL  
 NRBC 0.0 0  WBC ABSOLUTE NRBC 0.00 0.00 - 0.01 K/uL  
TROPONIN I Collection Time: 12/19/19  3:36 AM  
Result Value Ref Range Troponin-I, Qt. 0.07 (H) <0.05 ng/mL Urine dipstick:  
Lab Results Component Value Date/Time Color DARK YELLOW 12/19/2019 03:36 AM  
 Appearance CLEAR 12/19/2019 03:36 AM  
 Specific gravity 1.020 12/19/2019 03:36 AM  
 Specific gravity 1.020 01/02/2016 01:31 PM  
 pH (UA) 5.0 12/19/2019 03:36 AM  
 Protein 30 (A) 12/19/2019 03:36 AM  
 Glucose NEGATIVE  12/19/2019 03:36 AM  
 Ketone TRACE (A) 12/19/2019 03:36 AM  
 Bilirubin NEGATIVE  12/19/2019 03:36 AM  
 Urobilinogen 1.0 12/19/2019 03:36 AM  
 Nitrites NEGATIVE  12/19/2019 03:36 AM  
 Leukocyte Esterase TRACE (A) 12/19/2019 03:36 AM  
 Epithelial cells FEW 12/19/2019 03:36 AM  
 Bacteria NEGATIVE  12/19/2019 03:36 AM  
 WBC 5-10 12/19/2019 03:36 AM  
 RBC 0-5 12/19/2019 03:36 AM  
 
 
I have reviewed the following: All pertinent labs, microbiology data, radiology imaging for my assessment Medications list Personally Reviewed   [x]      Yes     []               No    
 
Medications: 
Prior to Admission medications Medication Sig Start Date End Date Taking? Authorizing Provider  
furosemide (LASIX) 80 mg tablet Take 80 mg by mouth daily. Yes Provider, Historical  
sertraline (ZOLOFT) 50 mg tablet Take 50 mg by mouth daily. Yes Provider, Historical  
tiotropium bromide (SPIRIVA RESPIMAT) 1.25 mcg/actuation inhaler Take 2 Puffs by inhalation daily. Yes Provider, Historical  
HYDROcodone-acetaminophen (NORCO) 5-325 mg per tablet Take 1 Tab by mouth every four (4) hours as needed for Pain. Yes Provider, Historical  
metOLazone (ZAROXOLYN) 2.5 mg tablet Take 2.5 mg by mouth. Tuesday, Thursday, Saturday    Yes Provider, Historical  
finasteride (PROSCAR) 5 mg tablet Take 5 mg by mouth daily. Yes Provider, Historical  
tamsulosin (FLOMAX) 0.4 mg capsule Take 0.4 mg by mouth daily. Yes Provider, Historical  
gabapentin (NEURONTIN) 300 mg capsule Take 300 mg by mouth two (2) times a day.    Yes Provider, Historical  
 enalapril (VASOTEC) 5 mg tablet Take 5 mg by mouth daily. Yes Provider, Historical  
metoprolol succinate (TOPROL-XL) 25 mg XL tablet Take 12.5 mg by mouth daily. Yes Provider, Historical  
simvastatin (ZOCOR) 20 mg tablet Take 20 mg by mouth nightly. Yes Other, MD Ángela  
  
 
Thank you for allowing us to participate in the care of this patient. We will follow patient. Please dont hesitate to call with any questions Monica Austin, [address] [facility] [address] [facility], [address] [facility] Phone - [phone] Fax - [phone] 
[email or web address]

## 2019-12-19 NOTE — PROGRESS NOTES
Spiritual Care Assessment/Progress Note [address] 
 
 
NAME: Lashonda Jenkins      MRN: [id number] AGE: 80 y.o. SEX: male Congregation Affiliation: Eulalio Azul Language: English  
 
12/19/2019     Total Time (in minutes): 8 Spiritual Assessment begun in [address], [facility] [address] DEP through conversation with: 
  
    []Patient        [] Family    [] Friend(s) Reason for Consult: Palliative Care, Initial/Spiritual Assessment Spiritual beliefs: (Please include comment if needed) 
   [] Identifies with a mehul tradition:     
   [] Supported by a mehul community:        
   [] Claims no spiritual orientation:       
   [] Seeking spiritual identity:            
   [] Adheres to an individual form of spirituality:       
   [x] Not able to assess:                   
 
    
Identified resources for coping:  
   [] Prayer                           
   [] Music                  [] Guided Imagery 
   [] Family/friends                 [] Pet visits [] Devotional reading                         [x] Unknown 
   [] Other:                                         
 
 
Interventions offered during this visit: (See comments for more details) Plan of Care: 
 
 [] Support spiritual and/or cultural needs  
 [] Support AMD and/or advance care planning process    
 [] Support grieving process 
 [] Coordinate Rites and/or Rituals  
 [] Coordination with community clergy [] No spiritual needs identified at this time 
 [] Detailed Plan of Care below (See Comments)  [] Make referral to Music Therapy 
[] Make referral to Pet Therapy    
[] Make referral to Addiction services 
[] Make referral to [facility] 
[] Make referral to Spiritual Care Partner 
[] No future visits requested       
[x] Follow up visits as needed Comments:  visit for initial spiritual assessment, palliative care consult. Patient resting quietly on gurney in Emergency Room (ER) room 7. Did not awaken to knock at door or verbal greeting. No family or visitors present. Will continue to follow up as needed and upon request as able. Visited by Rev. Faustino Keen MDiv, [facility]: [phone])

## 2019-12-19 NOTE — ED NOTES
Patient reports discomfort and itching of lower abdominal quads. RN noted a red rash to lower abdomen. Hospitalist Team 7 paged.

## 2019-12-19 NOTE — ED NOTES
ED MD EKG interpretation: There is an occasional left bundle branch block is noted. Ventricular paced beat with extrasystoles. Hemant Armando MD

## 2019-12-19 NOTE — ED NOTES
Patient called out and reports discomfort. Patient repositioned in bed. RN will continue to monitor.

## 2019-12-19 NOTE — ED NOTES
Per hospitalist RN to continue maintenance fluids at 75ml/hr and monitor BP for a consistent MAP reading of >65.

## 2019-12-19 NOTE — CONSULTS
CARDIOLOGY CONSULT Subjective: 
 
Date of  Admission: 12/18/2019 10:05 PM  
 
Admission type:Emergency Leann Roger is a 80 y.o. male admitted for Hypotension [I95.9]. He has had progressive edema and dyspnea. Multiple attempts have been made to augment diuresis with medication adjustment but he continued to complain of symptoms. Echo performed yesterday showed stable ischemic cardiomyopathy. Pacemaker interrogation showed markedly elevated Optivol which indicated impedance and indirectly blood volume. He became very weak and came to ED. He did not have LOC. Has received IVF and is still hypotensive. BNP much higher than last check a few weeks ago. Patient Active Problem List  
 Diagnosis Date Noted  Hypotension 12/19/2019  Hematuria 12/30/2015  Fecal impaction ([address] 12/27/2015  Hypertension 12/27/2015  Urinary retention 12/27/2015  Rectal bleed 07/19/2015  Lower GI bleed 07/18/2015  Nausea & vomiting 11/25/2010 Alesia Magallanes MD 
Past Medical History:  
Diagnosis Date  Acute MI ([address]  Atrial fibrillation ([address]  BPH (benign prostatic hyperplasia)  Cancer ([address] Skin  Chronic obstructive pulmonary disease ([address]  Heart failure ([address]  Hypertension  Pacemaker 06/18/2013 Medtronic Pacer/Defibrillator that is not MRI compatible per [employer]. Past Surgical History:  
Procedure Laterality Date  CARDIAC SURG PROCEDURE UNLIST    
 cabg x 3  
 HX ORTHOPAEDIC    
 shoulder, hand and back surgery  HX PACEMAKER Allergies Allergen Reactions  Prednisone Other (comments) Bloats him History reviewed. No pertinent family history. Current Facility-Administered Medications Medication Dose Route Frequency  sodium chloride (NS) flush 5-40 mL  5-40 mL IntraVENous Q8H  
 sodium chloride (NS) flush 5-40 mL  5-40 mL IntraVENous PRN  
 diphenhydrAMINE (BENADRYL) injection 25 mg  25 mg IntraVENous Q6H PRN  
  HYDROcodone-acetaminophen (NORCO) 5-325 mg per tablet 1 Tab  1 Tab Oral Q4H PRN  
 [START ON 12/20/2019] sertraline (ZOLOFT) tablet 50 mg  50 mg Oral DAILY  simvastatin (ZOCOR) tablet 20 mg  20 mg Oral QHS  [START ON 12/20/2019] tamsulosin (FLOMAX) capsule 0.4 mg  0.4 mg Oral DAILY  DOBUTamine (DOBUTREX) 500 mg/250 mL (2,000 mcg/mL) infusion  0-10 mcg/kg/min IntraVENous TITRATE  methylPREDNISolone (PF) (SOLU-MEDROL) injection 40 mg  40 mg IntraVENous Q12H Current Outpatient Medications Medication Sig  furosemide (LASIX) 80 mg tablet Take 80 mg by mouth daily.  sertraline (ZOLOFT) 50 mg tablet Take 50 mg by mouth daily.  tiotropium bromide (SPIRIVA RESPIMAT) 1.25 mcg/actuation inhaler Take 2 Puffs by inhalation daily.  HYDROcodone-acetaminophen (NORCO) 5-325 mg per tablet Take 1 Tab by mouth every four (4) hours as needed for Pain.  metOLazone (ZAROXOLYN) 2.5 mg tablet Take 2.5 mg by mouth. Tuesday, Thursday, Saturday   
 finasteride (PROSCAR) 5 mg tablet Take 5 mg by mouth daily.  tamsulosin (FLOMAX) 0.4 mg capsule Take 0.4 mg by mouth daily.  gabapentin (NEURONTIN) 300 mg capsule Take 300 mg by mouth two (2) times a day.  enalapril (VASOTEC) 5 mg tablet Take 5 mg by mouth daily.  metoprolol succinate (TOPROL-XL) 25 mg XL tablet Take 12.5 mg by mouth daily.  simvastatin (ZOCOR) 20 mg tablet Take 20 mg by mouth nightly. Review of Symptoms: 
Gen - no F/C/S Eyes - no vision changes ENT - no sore throat, rhinorrhea, otalgia CV - no CP, no palpitations, no orthopnea, no PND, + HIGINIO Resp no cough, +SOB/BO 
GI - no AP, no n/v/d/c 
 - no dysuria, no hematuria MSK - no abnormal joint pains Skin - no rashes Neuro - no HA, no numbness, no weakness, no slurred speech Psych - no change in mood Physical Exam 
 
Visit Vitals BP (!) 86/55 Pulse 71 Temp 97.8 °F (36.6 °C) Resp 16 Ht 5' 10\" (1.778 m) Wt 99.9 kg (220 lb 3.8 oz) SpO2 95% BMI 31.60 kg/m² NAD Skin warm and dry Nl conjunctiva Oropharynx without exudate. Neck supple Lungs with decreased basilar BS Irreg No Murmurs, click or Rubs Abdomen soft and non tender Pulses 2+ radials +++HIGINIO Neuro:  Grossly intact Appropriate Cardiographics Telemetry: AFIB 
ECG: atrial fibrillation Labs:  
Recent Results (from the past 24 hour(s)) SAMPLES BEING HELD Collection Time: 12/18/19 10:31 PM  
Result Value Ref Range SAMPLES BEING HELD 1BLUE   
 COMMENT Add-on orders for these samples will be processed based on acceptable specimen integrity and analyte stability, which may vary by analyte. CBC WITH AUTOMATED DIFF Collection Time: 12/18/19 10:31 PM  
Result Value Ref Range WBC 4.7 4.1 - 11.1 K/uL  
 RBC 3.93 (L) 4.10 - 5.70 M/uL  
 HGB 12.5 12.1 - 17.0 g/dL HCT 38.0 36.6 - 50.3 % MCV 96.7 80.0 - 99.0 FL  
 MCH 31.8 26.0 - 34.0 PG  
 MCHC 32.9 30.0 - 36.5 g/dL  
 RDW 19.1 (H) 11.5 - 14.5 % PLATELET 76 (L) 218 - 400 K/uL MPV 10.2 8.9 - 12.9 FL  
 NRBC 0.0 0  WBC ABSOLUTE NRBC 0.00 0.00 - 0.01 K/uL NEUTROPHILS 67 32 - 75 % LYMPHOCYTES 18 12 - 49 % MONOCYTES 8 5 - 13 % EOSINOPHILS 7 0 - 7 % BASOPHILS 1 0 - 1 % IMMATURE GRANULOCYTES 0 0.0 - 0.5 % ABS. NEUTROPHILS 3.1 1.8 - 8.0 K/UL  
 ABS. LYMPHOCYTES 0.8 0.8 - 3.5 K/UL  
 ABS. MONOCYTES 0.4 0.0 - 1.0 K/UL  
 ABS. EOSINOPHILS 0.3 0.0 - 0.4 K/UL  
 ABS. BASOPHILS 0.0 0.0 - 0.1 K/UL  
 ABS. IMM. GRANS. 0.0 0.00 - 0.04 K/UL  
 DF AUTOMATED METABOLIC PANEL, COMPREHENSIVE Collection Time: 12/18/19 10:31 PM  
Result Value Ref Range Sodium 141 136 - 145 mmol/L Potassium 4.5 3.5 - 5.1 mmol/L Chloride 105 97 - 108 mmol/L  
 CO2 29 21 - 32 mmol/L Anion gap 7 5 - 15 mmol/L Glucose 95 65 - 100 mg/dL BUN 38 (H) 6 - 20 MG/DL Creatinine 2.35 (H) 0.70 - 1.30 MG/DL  
 BUN/Creatinine ratio 16 12 - 20 GFR est AA 32 (L) >60 ml/min/1.73m2 GFR est non-AA 26 (L) >60 ml/min/1.73m2 Calcium 9.2 8.5 - 10.1 MG/DL Bilirubin, total 1.4 (H) 0.2 - 1.0 MG/DL  
 ALT (SGPT) 22 12 - 78 U/L  
 AST (SGOT) 31 15 - 37 U/L Alk. phosphatase 98 45 - 117 U/L Protein, total 7.2 6.4 - 8.2 g/dL Albumin 3.3 (L) 3.5 - 5.0 g/dL Globulin 3.9 2.0 - 4.0 g/dL A-G Ratio 0.8 (L) 1.1 - 2.2    
TROPONIN I Collection Time: 12/18/19 10:31 PM  
Result Value Ref Range Troponin-I, Qt. 0.07 (H) <0.05 ng/mL NT-PRO BNP Collection Time: 12/18/19 10:31 PM  
Result Value Ref Range NT pro-BNP 10,537 (H) <450 PG/ML  
EKG, 12 LEAD, INITIAL Collection Time: 12/18/19 11:45 PM  
Result Value Ref Range Ventricular Rate 72 BPM  
 Atrial Rate 70 BPM  
 QRS Duration 84 ms Q-T Interval 420 ms QTC Calculation (Bezet) 459 ms Calculated R Axis 14 degrees Calculated T Axis 162 degrees Diagnosis Ventricular-paced rhythm with occasional premature atrial complexes When compared with ECG of 27-OCT-2019 22:10, 
Electronic ventricular pacemaker has replaced Sinus rhythm Confirmed by Ravinder Quiroz M.D., Valrie Kawasaki ([id number]) on 12/19/2019 11:16:35 AM 
  
URINALYSIS W/MICROSCOPIC Collection Time: 12/19/19  3:36 AM  
Result Value Ref Range Color DARK YELLOW Appearance CLEAR CLEAR Specific gravity 1.020 1.003 - 1.030    
 pH (UA) 5.0 5.0 - 8.0 Protein 30 (A) NEG mg/dL Glucose NEGATIVE  NEG mg/dL Ketone TRACE (A) NEG mg/dL Bilirubin NEGATIVE  NEG Blood MODERATE (A) NEG Urobilinogen 1.0 0.2 - 1.0 EU/dL Nitrites NEGATIVE  NEG Leukocyte Esterase TRACE (A) NEG    
 WBC 5-10 0 - 4 /hpf  
 RBC 0-5 0 - 5 /hpf Epithelial cells FEW FEW /lpf Bacteria NEGATIVE  NEG /hpf Hyaline cast 0-2 0 - 5 /lpf URINE CULTURE HOLD SAMPLE Collection Time: 12/19/19  3:36 AM  
Result Value Ref Range Urine culture hold URINE ON HOLD IN MICROBIOLOGY DEPT FOR 3 DAYS.  IF UNPRESERVED URINE IS SUBMITTED, IT CANNOT BE USED FOR ADDITIONAL TESTING AFTER 24 HRS, RECOLLECTION WILL BE REQUIRED. METABOLIC PANEL, BASIC Collection Time: 12/19/19  3:36 AM  
Result Value Ref Range Sodium 140 136 - 145 mmol/L Potassium 4.3 3.5 - 5.1 mmol/L Chloride 107 97 - 108 mmol/L  
 CO2 27 21 - 32 mmol/L Anion gap 6 5 - 15 mmol/L Glucose 94 65 - 100 mg/dL BUN 39 (H) 6 - 20 MG/DL Creatinine 2.21 (H) 0.70 - 1.30 MG/DL  
 BUN/Creatinine ratio 18 12 - 20 GFR est AA 34 (L) >60 ml/min/1.73m2 GFR est non-AA 28 (L) >60 ml/min/1.73m2 Calcium 8.6 8.5 - 10.1 MG/DL MAGNESIUM Collection Time: 12/19/19  3:36 AM  
Result Value Ref Range Magnesium 2.1 1.6 - 2.4 mg/dL CBC W/O DIFF Collection Time: 12/19/19  3:36 AM  
Result Value Ref Range WBC 3.6 (L) 4.1 - 11.1 K/uL  
 RBC 3.57 (L) 4.10 - 5.70 M/uL  
 HGB 11.4 (L) 12.1 - 17.0 g/dL HCT 33.7 (L) 36.6 - 50.3 % MCV 94.4 80.0 - 99.0 FL  
 MCH 31.9 26.0 - 34.0 PG  
 MCHC 33.8 30.0 - 36.5 g/dL  
 RDW 18.6 (H) 11.5 - 14.5 % PLATELET 69 (L) 239 - 400 K/uL MPV 10.7 8.9 - 12.9 FL  
 NRBC 0.0 0  WBC ABSOLUTE NRBC 0.00 0.00 - 0.01 K/uL  
TROPONIN I Collection Time: 12/19/19  3:36 AM  
Result Value Ref Range Troponin-I, Qt. 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 Started dobutamine, may need to add low dose vasopressors but avoid if able Stopped IVF Echo yesterday showed stable LVEF, no need to recheck Holding ACE and BB due to hypotension BMP in AM 
No need to check further troponins Cont Xarelto, long term plan was to pursue DCCV in 4 weeks; unable to perform now as pt has significant food regurgitation concerning for esophageal disease and would be high risk for perforation of RUBY probe Agree with palliative consult Thank you for this consult, please call with questions Assessment:

## 2019-12-19 NOTE — NURSE NAVIGATOR
Chart reviewed by Heart Failure Nurse Navigator. Heart Failure database completed. EF:  41/145 % (echo 10/2018) ACEi/ARB/ARNi: enalapril 5 mg daily BB:  
 
Aldosterone Antagonist:  
 
Obstructive Sleep Apnea Screening: STOP-BANG score: 
 Referred to Sleep Medicine: CRT **. NYHA Functional Class III on admission Heart Failure Teach Back in Patient Education. Heart Failure Avoiding Triggers on Discharge Instructions. Cardiologist: cardiology not yet consulted (followed by Dr. Yariel Strange outpatient) Post discharge follow up phone call to be made within 48-72 hours of discharge.

## 2019-12-19 NOTE — PROGRESS NOTES
Admission Medication Reconciliation: 
 
Information obtained from:  Son 
RxQuery data available¹:  YES Comments/Recommendations: Updated PTA meds/reviewed patient's allergies. 1)  Reviewed medication list provided by patient's son. Patient unable to tell whether he took his medications yesterday. 2)  Medication changes (since last review): Added - Sertraline 50mg 1tab po daily - Spiriva Respimat 1.25 mcg/actuation 2puffs by inhalation daily - Hydrocodone-acetaminophen 5-325mg 1tab po as needed - Metolazone 2.5mg 1tab po on Tues/Thurs/Saturday Adjusted - Gabapentin 300mg 1cap po TID --> now BID 
  
¹RxQuery pharmacy benefit data reflects medications filled and processed through the patient's insurance, however  
this data does NOT capture whether the medication was picked up or is currently being taken by the patient. Allergies:  Prednisone Significant PMH/Disease States:  
Past Medical History:  
Diagnosis Date Acute MI ([address] [facility] [address] Atrial fibrillation ([facility] [address] [facility] BPH (benign prostatic hyperplasia) Cancer ([address] [facility] [address] Skin Chronic obstructive pulmonary disease (HCC) Heart failure ([facility] [address] [facility] Hypertension Pacemaker 06/18/2013 Medtronic Pacer/Defibrillator that is not MRI compatible per [employer]. Chief Complaint for this Admission: Chief Complaint Patient presents with Hypotension Prior to Admission Medications:  
Prior to Admission Medications Prescriptions Last Dose Informant Patient Reported? Taking? HYDROcodone-acetaminophen (NORCO) 5-325 mg per tablet   Yes Yes Sig: Take 1 Tab by mouth every four (4) hours as needed for Pain.  
enalapril (VASOTEC) 5 mg tablet 12/19/2019 at Unknown time  Yes Yes Sig: Take 5 mg by mouth daily. finasteride (PROSCAR) 5 mg tablet 12/19/2019 at Unknown time  Yes Yes Sig: Take 5 mg by mouth daily. furosemide (LASIX) 80 mg tablet   Yes Yes Sig: Take 80 mg by mouth daily. gabapentin (NEURONTIN) 300 mg capsule 12/19/2019 at Unknown time  Yes Yes Sig: Take 300 mg by mouth two (2) times a day. metOLazone (ZAROXOLYN) 2.5 mg tablet   Yes Yes Sig: Take 2.5 mg by mouth. Tuesday, Thursday, Saturday   
metoprolol succinate (TOPROL-XL) 25 mg XL tablet 12/19/2019 at Unknown time  Yes Yes Sig: Take 12.5 mg by mouth daily. sertraline (ZOLOFT) 50 mg tablet   Yes Yes Sig: Take 50 mg by mouth daily. simvastatin (ZOCOR) 20 mg tablet 12/19/2019 at Unknown time  Yes Yes Sig: Take 20 mg by mouth nightly. tamsulosin (FLOMAX) 0.4 mg capsule 12/19/2019 at Unknown time  Yes Yes Sig: Take 0.4 mg by mouth daily. tiotropium bromide (SPIRIVA RESPIMAT) 1.25 mcg/actuation inhaler   Yes Yes Sig: Take 2 Puffs by inhalation daily. Facility-Administered Medications: None Please contact the main inpatient pharmacy with any questions or concerns at [phone] and we will direct you to the clinical pharmacist covering this patient's care while in-house.   
Ravin Rae, CHEIKHD

## 2019-12-19 NOTE — ED PROVIDER NOTES
This is a 78-year-old male with a history of MI, hypertension, pacemaker for atrial fib and chronic obstructive pulmonary disease. The patient has been followed by cardiology for fluid retention and had been on Bumex which was recently changed to 1 mg daily. He had no results from this diuretic and was seen today by the cardiologist and put back on Lasix 80 mg a day. He has not taken this medication. Patient was also seen by the primary physician and his blood pressures have been running in the 110 range. His physicians were happy with his pressure. At home today, the patient had fallen 3 times, with 1 of those times being from tripping. The other 2 his legs just got weak and gave out. He has had no fever or chill, no chest pain and only mild shortness of breath. There is been no nausea or vomiting and no acute urinary or bowel symptoms. Patient's son noted tonight that his blood pressure was in the 80s. He was told by a family member who is just to come to the hospital for further evaluation. The patient is asymptomatic at this time but has a systolic pressure of 84. Past Medical History:  
Diagnosis Date  Acute MI ([address]  Cancer ([address] Skin  Chronic obstructive pulmonary disease ([address]  Hypertension  Pacemaker 06/18/2013 Medtronic Pacer/Defibrillator that is not MRI compatible per [employer]. Past Surgical History:  
Procedure Laterality Date  CARDIAC SURG PROCEDURE UNLIST    
 cabg x 3  
 HX ORTHOPAEDIC    
 shoulder, hand and back surgery  HX PACEMAKER History reviewed. No pertinent family history. Social History Socioeconomic History  Marital status:  Spouse name: Not on file  Number of children: Not on file  Years of education: Not on file  Highest education level: Not on file Occupational History  Not on file Social Needs  Financial resource strain: Not on file  Food insecurity: Worry: Not on file Inability: Not on file  Transportation needs:  
  Medical: Not on file Non-medical: Not on file Tobacco Use  Smoking status: Former Smoker Packs/day: 0.50 Years: 40.00 Pack years: 20.00 Last attempt to quit: 1984 Years since quittin.0  Smokeless tobacco: Never Used Substance and Sexual Activity  Alcohol use: Yes Alcohol/week: 5.8 standard drinks Types: 7 Glasses of wine per week  Drug use: No  
 Sexual activity: Not on file Lifestyle  Physical activity:  
  Days per week: Not on file Minutes per session: Not on file  Stress: Not on file Relationships  Social connections:  
  Talks on phone: Not on file Gets together: Not on file Attends Anabaptist service: Not on file Active member of club or organization: Not on file Attends meetings of clubs or organizations: Not on file Relationship status: Not on file  Intimate partner violence:  
  Fear of current or ex partner: Not on file Emotionally abused: Not on file Physically abused: Not on file Forced sexual activity: Not on file Other Topics Concern  Not on file Social History Narrative  Not on file ALLERGIES: Prednisone Review of Systems Constitutional: Negative for activity change, appetite change and fatigue. HENT: Negative for ear pain, facial swelling, sore throat and trouble swallowing. Eyes: Negative for pain, discharge and visual disturbance. Respiratory: Negative for chest tightness, shortness of breath and wheezing. Cardiovascular: Negative for chest pain and palpitations. Gastrointestinal: Negative for abdominal pain, blood in stool, nausea and vomiting. Genitourinary: Negative for difficulty urinating, flank pain and hematuria. Musculoskeletal: Negative for arthralgias, joint swelling, myalgias and neck pain. Skin: Negative for color change and rash. Neurological: Positive for weakness. Negative for dizziness, numbness and headaches. Hematological: Negative for adenopathy. Does not bruise/bleed easily. Psychiatric/Behavioral: Negative for behavioral problems, confusion and sleep disturbance. All other systems reviewed and are negative. Vitals:  
 12/18/19 2121 12/18/19 2122 BP: (!) 81/61 93/64 Pulse: 76 75 Resp: 16 SpO2: 97% Weight: 96.2 kg (212 lb) Height: 5' 10\" (1.778 m) Physical Exam 
Vitals signs and nursing note reviewed. Constitutional:   
   General: He is not in acute distress. Appearance: He is well-developed. Comments: Blood pressure is 84 systolic. HENT:  
   Head: Normocephalic and atraumatic. Nose: Nose normal.  
Eyes:  
   General: No scleral icterus. Conjunctiva/sclera: Conjunctivae normal.  
   Pupils: Pupils are equal, round, and reactive to light. Neck: Musculoskeletal: Normal range of motion and neck supple. Thyroid: No thyromegaly. Vascular: No JVD. Trachea: No tracheal deviation. Comments: No carotid bruits noted. Cardiovascular:  
   Rate and Rhythm: Normal rate and regular rhythm. Heart sounds: Murmur (g2/6 anat) present. No friction rub. No gallop. Pulmonary:  
   Effort: Pulmonary effort is normal. No respiratory distress. Breath sounds: Normal breath sounds. No wheezing or rales. Chest:  
   Chest wall: No tenderness. Abdominal:  
   General: Bowel sounds are normal. There is no distension. Palpations: Abdomen is soft. There is no mass. Tenderness: There is no tenderness. There is no guarding or rebound. Musculoskeletal: Normal range of motion. General: No tenderness. Right lower leg: Edema present. Left lower leg: Edema present. Lymphadenopathy:  
   Cervical: No cervical adenopathy. Skin: 
   General: Skin is warm and dry. Findings: No erythema or rash. Comments: There is an erythematous macular style rash without any elevation on both sides of the abdomen right and left lower quadrants that is not vesicular in nature. There is some excoriation on it. He has similar rash on his lower back. It is unclear as to the etiology of this rash. He has used no new soaps, clothes or medications. It is unclear whether this rash is contributing to the patient's low pressures. Neurological:  
   Mental Status: He is alert and oriented to person, place, and time. Cranial Nerves: No cranial nerve deficit. Coordination: Coordination normal.  
   Deep Tendon Reflexes: Reflexes are normal and symmetric. Psychiatric:     
   Behavior: Behavior normal.     
   Thought Content: Thought content normal.     
   Judgment: Judgment normal.  
 
  
 
MDM Number of Diagnoses or Management Options Hypotension, unspecified hypotension type: new and requires workup Amount and/or Complexity of Data Reviewed Clinical lab tests: ordered and reviewed Tests in the radiology section of CPT®: ordered and reviewed Decide to obtain previous medical records or to obtain history from someone other than the patient: yes Obtain history from someone other than the patient: yes Review and summarize past medical records: yes Discuss the patient with other providers: yes Independent visualization of images, tracings, or specimens: yes Risk of Complications, Morbidity, and/or Mortality Presenting problems: high Diagnostic procedures: high Management options: high Patient Progress Patient progress: stable Procedures Given this patient's blood pressures, and several falls today, it is felt that admission and further evaluation is necessary. Family agrees. Jodie Caro Text for Admission 11:24 PM 
 
ED Room Number: ER07/07 Patient Name and age:  Marques Pierre 80 y.o.  male Working Diagnosis: 1. Hypotension, unspecified hypotension type Readmission: no 
Isolation Requirements:  no 
Recommended Level of Care:  med/surg Code Status:  full Other:   
Department:[facility] Adult ED - [phone] Renal function has been abnormal.  Question is whether this patient is dry. He is being given a bolus in the ED. Consult the hospitalist for admission. The patient was discussed with Dr. Yudy Alegria on call for cardiology. Hospitalist is asked to admit for further evaluation and treatment of this prolonged hypotension. Troponin is slightly elevated. EKG failed to demonstrate any acute process. Patient is in no acute distress in the ED. There is no significant dizziness, shortness of breath or chest discomfort.

## 2019-12-20 NOTE — CONSULTS
Palliative Medicine Consult [place]: [phone]) Patient Name: Jyoti Jenkins YOB: 1929 Date of Initial Consult: 12/20/19 Reason for Consult: care goals Requesting Provider: Dr. Elena Fischer Primary Care Physician: Christy Springer MD 
 
 SUMMARY:  
Jyoti Jenkins is a 89 [address] y.o. with a past history of ICM, CABG, chronic systolic CHF w/ EF 33-30 % (10/2019), afib s/p PPM, who presented to the ED on 12/18/2019 after a series of falls at home. He was found to be hypotensive and admitted with acutely decompensated CHF. Dr. Ann Hamlin has been following him outpatient and has had difficulty managing his volume status with oral diuretics. He was recently started on Xarelto with plan to perform cardioversion in 4 weeks. He was last admitted 10/27-11/1 for cellulitis and has had declining health since that time per son. Palliative medicine was consulted to discuss care goals. Pt has an AMD which designates his two sons as joint healthcare decision makers since his wife has passed. Social Hx:  x 10 yrs,  76 yrs. Retired as a  /  of [email or web address] which eventually became [employer]. Two sons- Edith Gordon and Mirella Quinones. Moved in with Edith Gordon and [facility]UDI last Jan.   
 
 PALLIATIVE DIAGNOSES:  
1. Care goals discussion 2. Generalized weakness and fatigue 3. Anorexia 4. ICM, Systolic CHF w/ EF 44-42% 5. Stage II sacral wound 6. Leg edema w/ recent cellulitis PLAN:  
1. Met with pt, introduced self and role of palliative care team.  
2. He showed some mild confusion and certainly recent memory loss, kept forgetting he was in the hospital, but always able to reorient himself and carry on a coherent conversation. Has basic understanding of his health issues but doesn't clearly remember why he is here. 3. He did share a lot about his life. Family is important to him, he relies on his two sons and their wives for support.   Enjoys spending time with them. Has a close network of friends and previously enjoyed golf. Hasn't been able to golf in many months (he stated weeks- son corrects). 4. I met separately with son Trena De La Rosa per patient preference. Dilan Alan is at work and not available to talk. 5. Reviewed heart failure as chronic progressive illness, general decline with many ups and downs. 10. Trena De La Rosa iterated that his goal is that his Father not suffer in any way. He recognizes his Father is nearing the end and that based on prior discussions, he wouldn't want anything invasive or life prolonging. Trena De La Rosa saw his mother go through this and wouldn't want it for his Father. 7. Shared he qualifies for Hospice services at home based on his current functional status and symptoms which appear refractory to outpatient therapy. Relayed Hospice core services and goals of good symptom focused care outside the hospital setting. 6. Trena De La Rosa states that he and his brother are on the same page, they had a long discussion last night. He thinks his brother will be open as he is to considering Hospice admission at home after this admission. Hospice informational session is first step. 9. Talked with Dr. Tim Malcolm. Dobutamine seems to have made a difference since initiation 16 hrs ago (improved kidney function, diuresing). Goal was primarily for short term optimization. We reviewed a plan to continue it for another couple of days to optimize his volume status but discontinue prior to discharge home if Hospice was the plan. Would also forego Xarelto and plan for cardioversion in this context. Shared this plan with Trena De La Rosa who was in agreement. 10. Placed consult to case management for Hospice- list of agencies to be shared with pt's sons. 6. Trena De La Rosa thinks pt has a DDNR at home, he will check with Dilan Alan. If not, would need signed before discharge. Pt should be able to sign this himself with one of his son's as support. 12. Initial consult note routed to primary continuity provider and/or primary health care team members 13. Thank you for allowing me to participate in the care of Mr. Matty Warren. Please call with questions. 14. Communicated plan of care with: Palliative IDT, [facility] Team, Alesia Sams, Dr. Jennifer Rodriguez, Dr. Silverio Logan GOALS OF CARE / TREATMENT PREFERENCES:  
 
GOALS OF CARE: 
Patient/Health Care Proxy Stated Goals: Comfort TREATMENT PREFERENCES:  
Code Status: DNR Advance Care Planning: 
[x] The [facility] Interdisciplinary Team has updated the ACP Navigator with [place] and Patient Capacity Primary Decision MakerTiffanie Bullard - [phone] Primary Decision Maker: Keely Guillermo - [phone] Advance Care Planning 12/19/2019 Patient's Healthcare Decision Maker is: - Confirm Advance Directive Yes, on file Does the patient have other document types Do Not Resuscitate; Power of [employer] Medical Interventions: Limited additional interventions Other: As far as possible, the palliative care team has discussed with patient / health care proxy about goals of care / treatment preferences for patient. HISTORY:  
 
History obtained from: patient, son Paresh Da Silva: p/w fall before admission, has been getting weaker at home HPI/SUBJECTIVE: The patient is:  
[x] Verbal and participatory [] Non-participatory due to:  
 
Pt admitted per history above. Today he feels generally well while at rest in bed. He denies any dyspnea at rest, nausea or pain. He isn't anxious or with low mood. He endorses poor appetite which isn't new, still able to eat but has no real desire, bites per son. Clinical Pain Assessment (nonverbal scale for severity on nonverbal patients):  
Clinical Pain Assessment Severity: 0 Duration: for how long has pt been experiencing pain (e.g., 2 days, 1 month, years) Frequency: how often pain is an issue (e.g., several times per day, once every few days, constant) FUNCTIONAL ASSESSMENT:  
 
Palliative Performance Scale (PPS): PPS: 50 PSYCHOSOCIAL/SPIRITUAL SCREENING:  
 
Palliative IDT has assessed this patient for cultural preferences / practices and a referral made as appropriate to needs (Cultural Services, Patient Advocacy, Ethics, etc.) Any spiritual / Christianity concerns: 
[] Yes /  [x] No 
 
Caregiver Burnout: 
[] Yes /  [x] No /  [] No Caregiver Present Anticipatory grief assessment:  
[x] Normal  / [] Maladaptive ESAS Anxiety: Anxiety: 0 
 
ESAS Depression: Depression: 0 REVIEW OF SYSTEMS:  
 
Positive and pertinent negative findings in ROS are noted above in HPI. The following systems were [x] reviewed / [] unable to be reviewed as noted in HPI Other findings are noted below. Systems: constitutional, ears/nose/mouth/throat, respiratory, gastrointestinal, genitourinary, musculoskeletal, integumentary, neurologic, psychiatric, endocrine. Positive findings noted below. Modified ESAS Completed by: provider Fatigue: 8 Drowsiness: 3 Depression: 0 Pain: 0 Anxiety: 0 Nausea: 0 Anorexia: 8 Dyspnea: 5 Constipation: No  
     
 
 
 PHYSICAL EXAM:  
 
From RN flowsheet: 
Wt Readings from Last 3 Encounters:  
12/20/19 97.9 kg (215 lb 13.3 oz) 11/01/19 96.6 kg (212 lb 14.4 oz) 05/09/19 95.6 kg (210 lb 12.2 oz) Blood pressure 101/69, pulse (!) 107, temperature 97.5 °F (36.4 °C), resp. rate 17, height 5' 10\" (1.778 m), weight 97.9 kg (215 lb 13.3 oz), SpO2 95 %. Pain Scale 1: Numeric (0 - 10) Pain Intensity 1: 0 Pain Location 1: Generalized Constitutional: generally well appearing elderly  male Eyes: pupils equal, anicteric ENMT: no nasal discharge, moist mucous membranes Cardiovascular: irregularly irregular rhythm Respiratory: breathing not labored, symmetric bs anteriorly Gastrointestinal: soft non-tender, +bowel sounds Musculoskeletal: oksana bl LEs w/ tense 2+ edema Skin: warm, dry Neurologic: A&Ox 3, mild confusion, following commands, moving all extremities Psychiatric: normal appropriate affect, calm, cooperative HISTORY:  
 
Active Problems: Hypotension (2019) Past Medical History:  
Diagnosis Date  Acute [facility] ([facility]  [facility]  BPH (benign prostatic hyperplasia)  Cancer ([facility] Skin  Chronic obstructive pulmonary disease ([facility]  Heart failure ([facility]  Hypertension  Pacemaker 2013 Medtronic Pacer/Defibrillator that is not MRI compatible per [employer]. Past Surgical History:  
Procedure Laterality Date  CARDIAC SURG PROCEDURE UNLIST    
 cabg x 3  
 HX ORTHOPAEDIC    
 shoulder, hand and back surgery  HX PACEMAKER History reviewed. No pertinent family history. History reviewed, no pertinent family history. Social History Tobacco Use  Smoking status: Former Smoker Packs/day: 0.50 Years: 40.00 Pack years: 20.00 Last attempt to quit: 1984 Years since quittin.0  Smokeless tobacco: Never Used Substance Use Topics  Alcohol use: Yes Alcohol/week: 5.8 standard drinks Types: 7 Glasses of wine per week Allergies Allergen Reactions  Prednisone Other (comments) Bloats him Current Facility-Administered Medications Medication Dose Route Frequency  bumetanide (BUMEX) injection 1 mg  1 mg IntraVENous Q12H  
 DOBUTamine (DOBUTREX) 500 mg/250 mL (2,000 mcg/mL) infusion  2.5 mcg/kg/min IntraVENous CONTINUOUS  
 balsam peru-castor oil (VENELEX) ointment   Topical Q8H  
 sodium chloride (NS) flush 5-40 mL  5-40 mL IntraVENous Q8H  
 sodium chloride (NS) flush 5-40 mL  5-40 mL IntraVENous PRN  
 diphenhydrAMINE (BENADRYL) injection 25 mg  25 mg IntraVENous Q6H PRN  
  HYDROcodone-acetaminophen (NORCO) 5-325 mg per tablet 1 Tab  1 Tab Oral Q4H PRN  
 sertraline (ZOLOFT) tablet 50 mg  50 mg Oral DAILY  simvastatin (ZOCOR) tablet 20 mg  20 mg Oral QHS  tamsulosin (FLOMAX) capsule 0.4 mg  0.4 mg Oral DAILY  rivaroxaban (XARELTO) tablet 15 mg  15 mg Oral DAILY WITH DINNER  
 
 
 
 LAB AND IMAGING FINDINGS:  
 
Lab Results Component Value Date/Time WBC 3.6 (L) 12/20/2019 03:00 AM  
 HGB 11.2 (L) 12/20/2019 03:00 AM  
 PLATELET 56 (L) 62/47/1714 03:00 AM  
 
Lab Results Component Value Date/Time Sodium 140 12/20/2019 03:00 AM  
 Potassium 4.8 12/20/2019 03:00 AM  
 Chloride 107 12/20/2019 03:00 AM  
 CO2 25 12/20/2019 03:00 AM  
 BUN 36 (H) 12/20/2019 03:00 AM  
 Creatinine 1.83 (H) 12/20/2019 03:00 AM  
 Calcium 8.8 12/20/2019 03:00 AM  
 Magnesium 2.1 12/19/2019 03:36 AM  
 Phosphorus 4.1 12/20/2019 03:00 AM  
  
Lab Results Component Value Date/Time AST (SGOT) 31 12/18/2019 10:31 PM  
 Alk. phosphatase 98 12/18/2019 10:31 PM  
 Protein, total 7.2 12/18/2019 10:31 PM  
 Albumin 3.4 (L) 12/20/2019 03:00 AM  
 Globulin 3.9 12/18/2019 10:31 PM  
 
Lab Results Component Value Date/Time INR 1.3 (H) 01/02/2016 01:31 PM  
 Prothrombin time 12.6 (H) 01/02/2016 01:31 PM  
 aPTT 29.7 01/02/2016 01:31 PM  
  
No results found for: IRON, FE, TIBC, IBCT, PSAT, FERR No results found for: PH, PCO2, PO2 No components found for: [place] Lab Results Component Value Date/Time CK 65 07/19/2015 05:17 AM  
 CK - MB 4.6 (H) 07/19/2015 05:17 AM  
  
 
 
   
 
Total time:  100m Counseling / coordination time, spent as noted above: 60m 
> 50% counseling / coordination?: y 
 
Prolonged service was provided for  []30 min   []75 min in face to face time in the presence of the patient, spent as noted above. Time Start:  10:40 Time End:  12:20 Note: this can only be billed with 87994 (initial) or [id number] (follow up). If multiple start / stop times, list each separately.

## 2019-12-20 NOTE — PROGRESS NOTES
Physical Therapy Screening: An [facility] screening referral was triggered for physical therapy based on results obtained during the nursing admission assessment. The patients chart was reviewed and the patient is appropriate for a skilled therapy evaluation if there is a decline in functional mobility from baseline. Please order a consult for physical therapy if you are in agreement and would like an evaluation to be completed. Thank you. Katherine Mckinnon, PT 
 
 
PLOF 10/2019 FUNCTIONAL STATUS PRIOR TO ADMISSION: Patient was independent and active without use of DME. Plays golf 4x/week. Still drives.   
 
HOME SUPPORT PRIOR TO ADMISSION: The patient lived with son and daughter-in-law but did not require assist.

## 2019-12-20 NOTE — PROGRESS NOTES
Problem: Heart Failure: Day 2 Goal: Medications Outcome: Progressing Towards Goal 
Pt on Bumex and dobutamine gtt Goal: Psychosocial 
Outcome: Progressing Towards Goal 
Family at bedside for support Goal: *Oxygen saturation within defined limits Outcome: Progressing Towards Goal 
Pt on RA saturating above 95% with no BO 
 
 
1930 Bedside shift change report given to Chris (oncoming nurse) by Jina Guajardo (offgoing nurse). Report included the following information SBAR, Kardex, Intake/Output, MAR, Accordion, Recent Results and Med Rec Status.

## 2019-12-20 NOTE — PROGRESS NOTES
TRANSFER - IN REPORT: 
 
Verbal report received from [address] (name) on Donato Harris  being received from [address] (unit) for routine progression of care Report consisted of patients Situation, Background, Assessment and  
Recommendations(SBAR). Information from the following report(s) SBAR, Kardex, ED Summary, Intake/Output, MAR, Recent Results, and Cardiac Rhythm Afib/Paced was reviewed with the receiving nurse. Opportunity for questions and clarification was provided. Assessment completed upon patients arrival to unit and care assumed. 2200: Pt tolerating gtt. Weaned dobutamine gtt from 3.0 to 2.5 mcg/kg/hr. Will continue to monitor. Problem: General Medical Care Plan Goal: *Anxiety reduced or absent Outcome: Progressing Towards Goal 
Note:  
Pt anxiety visibly reduced from transfer from ED. Will continue to monitor for changes. Problem: Heart Failure: Day 2 Goal: Activity/Safety Outcome: Progressing Towards Goal 
Note:  
Pt remains safe and free from falls. Will continue to monitor. Goal: *Oxygen saturation within defined limits Outcome: Progressing Towards Goal 
Note:  
Pt's oxygen saturations remain above 95%. Problem: Risk for Spread of Infection Goal: Prevent transmission of infectious organism to others Description Prevent the transmission of infectious organisms to other patients, staff members, and visitors. Outcome: Progressing Towards Goal 
Note: Pt put on rule-out modified contact precautions for MRSA. Culture has not yet resulted. Bedside shift change report given to Kaila Everett RN (oncoming nurse) by VERONICA Lutz (offgoing nurse). Report included the following information SBAR, Kardex, ED Summary, Intake/Output, MAR, Recent Results, and Cardiac Rhythm Afib/Paced .

## 2019-12-20 NOTE — PROGRESS NOTES
Bedside shift change report given to Liliana Dean RN (oncoming nurse) by Felix Shearer RN (offgoing nurse). Report included the following information SBAR, Kardex, MAR and Cardiac Rhythm paced.

## 2019-12-20 NOTE — PROGRESS NOTES
[facility] 
 [address] [address], [address] [facility] Phone: [phone]   [phone]   
  
Nephrology Progress Note Marcela Black     1/1/1929     [id number] Date of Admission : 12/18/2019 12/20/19 CC:  Follow up for ARF Assessment and Plan ASHLYN : 
- pre renal azotemia from CRS, IV volume depletion and Hypotension  
- Non oliguric  
- Renal US :\" No Obstruction . Pre renal urine lytes - Continue to hold ACE/ARB 
- resume Bumex 1 mg IV BID  
- Dobutamine per cardiology  
- Not a dialysis candidate - Keep Reyes  
  
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  
- Chronic A fib - On Dobutamine gtt per cards  
  
Skin Rash : 
- ? Etiology  
  
Chronic Thrombocytopenia : ? 2/2 MR Leucopenia Anemia  
  
DNR Interval History: 
Seen and examined He is distressed from needing to have a BM  
UOP ~ 400 cc Renal US noted Cr trending down Review of Systems: Review of systems not obtained due to patient factors. Current Medications:  
Current Facility-Administered Medications Medication Dose Route Frequency  bumetanide (BUMEX) injection 1 mg  1 mg IntraVENous Q12H  
 sodium chloride (NS) flush 5-40 mL  5-40 mL IntraVENous Q8H  
 sodium chloride (NS) flush 5-40 mL  5-40 mL IntraVENous PRN  
 diphenhydrAMINE (BENADRYL) injection 25 mg  25 mg IntraVENous Q6H PRN  
 HYDROcodone-acetaminophen (NORCO) 5-325 mg per tablet 1 Tab  1 Tab Oral Q4H PRN  
 sertraline (ZOLOFT) tablet 50 mg  50 mg Oral DAILY  simvastatin (ZOCOR) tablet 20 mg  20 mg Oral QHS  tamsulosin (FLOMAX) capsule 0.4 mg  0.4 mg Oral DAILY  DOBUTamine (DOBUTREX) 500 mg/250 mL (2,000 mcg/mL) infusion  0-10 mcg/kg/min IntraVENous TITRATE  methylPREDNISolone (PF) (SOLU-MEDROL) injection 40 mg  40 mg IntraVENous Q12H  rivaroxaban (XARELTO) tablet 15 mg  15 mg Oral DAILY WITH DINNER  
  
 Allergies Allergen Reactions  Prednisone Other (comments) Bloats him Objective: 
Vitals:   
Vitals:  
 12/19/19 2009 12/19/19 2209 12/19/19 2301 12/20/19 1459 BP: 98/65 109/71 109/81 108/61 Pulse: (!) 108 (!) 104 (!) 113 (!) 115 Resp: 18 19 24 Temp: 97.3 °F (36.3 °C)  97.7 °F (36.5 °C) 98 °F (36.7 °C) SpO2: 95%  97% 96% Weight:    97.9 kg (215 lb 13.3 oz) Height:      
 
Intake and Output: 
No intake/output data recorded. 12/18 1901 - 12/20 0700 In: 1261.3 [P.O.:100; I.V.:1161.3] Out: 862 [Urine:862] Physical Examination: 
o General: Appears stated age, agitated Neck:  JVD + Resp:  DIMINISHED at bases CV:  Tachy, irregular, Fraser systolic murmur , 3+ LE edema GI:  Soft, NT, + BS Neurologic:  Non focal 
 : morales + Skin : macular rash on trunk + []    High complexity decision making was performed 
[]    Patient is at high-risk of decompensation with multiple organ involvement Lab Data Personally Reviewed: I have reviewed all the pertinent labs, microbiology data and radiology studies during assessment. Recent Labs  
  12/20/19 0300 12/19/19 0336 12/18/19 2231  140 141  
K 4.8 4.3 4.5  
 107 105 CO2 25 27 29 * 94 95 BUN 36* 39* 38* CREA 1.83* 2.21* 2.35* CA 8.8 8.6 9.2 MG  --  2.1  --   
PHOS 4.1  --   --   
ALB 3.4*  --  3.3* SGOT  --   --  31 ALT  --   --  22 Recent Labs  
  12/20/19 0300 12/19/19 0336 12/18/19 2231 WBC 3.6* 3.6* 4.7 HGB 11.2* 11.4* 12.5 HCT 32.7* 33.7* 38.0  
PLT 56* 69* 76* No results found for: SDES Lab Results Component Value Date/Time  Culture result: NO GROWTH 5 DAYS 10/27/2019 10:01 PM  
 Culture result: NO GROWTH 1 DAY 01/02/2016 01:31 PM  
 Culture result: MRSA NOT PRESENT 12/27/2015 10:31 PM  
 Culture result:  12/27/2015 10:31 PM  
      Screening of patient nares for MRSA is for surveillance purposes and, if positive, to facilitate isolation considerations in high risk settings. It is not intended for automatic decolonization interventions per se as regimens are not sufficiently effective to warrant routine use. Recent Results (from the past 24 hour(s)) CHLORIDE, URINE RANDOM Collection Time: 12/19/19  5:14 PM  
Result Value Ref Range Chloride,urine random 14 MMOL/L  
SODIUM, UR, RANDOM Collection Time: 12/19/19  5:14 PM  
Result Value Ref Range Sodium,urine random 19 MMOL/L  
CREATININE, UR, RANDOM Collection Time: 12/19/19  5:14 PM  
Result Value Ref Range Creatinine, urine 193.00 mg/dL CBC WITH AUTOMATED DIFF Collection Time: 12/20/19  3:00 AM  
Result Value Ref Range WBC 3.6 (L) 4.1 - 11.1 K/uL  
 RBC 3.47 (L) 4.10 - 5.70 M/uL  
 HGB 11.2 (L) 12.1 - 17.0 g/dL HCT 32.7 (L) 36.6 - 50.3 % MCV 94.2 80.0 - 99.0 FL  
 MCH 32.3 26.0 - 34.0 PG  
 MCHC 34.3 30.0 - 36.5 g/dL  
 RDW 18.6 (H) 11.5 - 14.5 % PLATELET 56 (L) 051 - 400 K/uL MPV 9.9 8.9 - 12.9 FL  
 NRBC 0.0 0  WBC ABSOLUTE NRBC 0.00 0.00 - 0.01 K/uL NEUTROPHILS 89 (H) 32 - 75 % LYMPHOCYTES 9 (L) 12 - 49 % MONOCYTES 1 (L) 5 - 13 % EOSINOPHILS 0 0 - 7 % BASOPHILS 0 0 - 1 % IMMATURE GRANULOCYTES 1 (H) 0.0 - 0.5 % ABS. NEUTROPHILS 3.3 1.8 - 8.0 K/UL  
 ABS. LYMPHOCYTES 0.3 (L) 0.8 - 3.5 K/UL  
 ABS. MONOCYTES 0.0 0.0 - 1.0 K/UL  
 ABS. EOSINOPHILS 0.0 0.0 - 0.4 K/UL  
 ABS. BASOPHILS 0.0 0.0 - 0.1 K/UL  
 ABS. IMM. GRANS. 0.0 0.00 - 0.04 K/UL  
 DF SMEAR SCANNED    
 RBC COMMENTS OVALOCYTES PRESENT 
    
 RBC COMMENTS ANISOCYTOSIS 1+ 
    
 RBC COMMENTS TARGET CELLS 
PRESENT 
    
RENAL FUNCTION PANEL Collection Time: 12/20/19  3:00 AM  
Result Value Ref Range Sodium 140 136 - 145 mmol/L Potassium 4.8 3.5 - 5.1 mmol/L Chloride 107 97 - 108 mmol/L  
 CO2 25 21 - 32 mmol/L Anion gap 8 5 - 15 mmol/L Glucose 101 (H) 65 - 100 mg/dL BUN 36 (H) 6 - 20 MG/DL  Creatinine 1.83 (H) 0.70 - 1.30 MG/DL  
 BUN/Creatinine ratio 20 12 - 20    
 GFR est AA 42 (L) >60 ml/min/1.73m2 GFR est non-AA 35 (L) >60 ml/min/1.73m2 Calcium 8.8 8.5 - 10.1 MG/DL Phosphorus 4.1 2.6 - 4.7 MG/DL Albumin 3.4 (L) 3.5 - 5.0 g/dL Total time spent with patient:  xxx   min. Care Plan discussed with: 
Patient Family RN Consulting Physician [facility]     
 
I have reviewed the flowsheets. Chart and Pertinent Notes have been reviewed. No change in PMH ,family and social history from Consult note.  
 
 
Sheryl Daly MD

## 2019-12-20 NOTE — WOUND CARE
Wound Care Note:  
 
New consult placed by nurse request for right posterior ankle wound; left anterior shin, sacrum red/blanchable Chart shows: 
Admitted for hypotension Past Medical History:  
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 WBC = 3.6 on 12/20/19 Admitted from home Assessment:  
Patient is alert and talking, continent with some assistance needed in repositioning. Bed: Total Care Patient has a Reyes. Diet: Cardiac regular Patient reports some pain in neck and shoulder; no number given. Bilateral heels skin intact with light erythema that is blanchable. Bilateral buttocks skin intact and without erythema. Palpable DP pulses bilaterally. 1. POA stage 2 sacral pressure injury measures 3.5 cm x 1.5 cm x 0.1 cm, wound bed is pink, most areas anaid but not all, no drainage, wound edges are open, jean claude-wound with blanchable erythema. Venelex ointment and specialty bed to be ordered. 2.  POA right medial lower leg with area measuring 1 cm x 1 cm x 0.1 cm with 3 small wounds, scant serous drainage, wound edges are open, jean claude-wound with come crusted wounds, hemosiderin staining and edema. Right lower leg with many crusted wounds approximately 0.2 cm x 0.2 cm; patient stated it has been itching and he has been scratching. Xeroform gauze, 4 x 4 and roll gauze applied. 3.  POA right posterior ankle with wound measuring 2.7 cm x 2 cm x 0.1 cm, light crusting to wound bed, scant serous drainage, wound edges are open, jean claude-wound with hemosiderin staining and edema. Xeroform gauze, 4 x 4 and roll gauze applied. 4. POA right anterior ankle with hyperpigmented area measuring 1.3 cm x  2 cm x 0 cm, area is maroon/black and is suspicious for DTI, no open area, jean claude-wound with edema and hemosiderin staining. Will continue to monitor; this area is covered with roll gauze for the posterior ankle so Venelex will not be used regularly. 5.  POA left anterior lower leg with two areas of crusted wounds, proximal measures 1.5 cm x 1.5 cm and distal measures 1.5 cm x 1 cm, no drainage, jean claude-wound with edema and hemosiderin staining. Left open to air. 6.  POA left posterior waist with large area of erythema that is blanchable, no increase in temperature, no open area. We will monitor. Left open to air. 7.  POA right lower back with large ecchymosis. Left open to air. Spoke with Dr. Grupo Gambino, notified of POA pressure injury; wound care orders obtained. Patient repositioned supine. Staff nurse to obtain pillows to turn and offload heels. Recommendations:   
Right medial lower leg and right posterior ankle- Every other day cleanse with normal saline, wipe wound bed clean and dry, apply a piece of xeroform gauze that has been folded in half, NOTE:  Before applying gauze, apply Venelex ointment to right anterior ankle (dark area), cover wounds with 4 x 4 and secure with roll gauze and tape. Sacrum and bilateral heels- Every 8 hours liberally apply Venelex ointment Specialty bed: P-500 bed ordered via [employer]. Use only flat sheet and one incontinence pad. Please call [facility] at [phone] if not delivered and when patient discharged. Confirmation #[id number] Skin Care & Pressure Prevention: 
Minimize layers of linen/pads under patient to optimize support surface. Turn/reposition approximately every 2 hours and offload heels. Manage incontinence / promote continence Nourishing Skin Cream to dry skin Discussed above plan with patient & Sveta Veliz RN 
 
 Transition of Care: Plan to follow as needed while admitted to hospital. 
 
Kb Kaur" HUGO LeaN, RN, [facility], [facility]. 
office [phone] 
pager [phone] or call  to page

## 2019-12-20 NOTE — PROGRESS NOTES
Hospitalist Progress Note Hospital summary: Dylan Bravo a 80 y. o. male with congestive heart failure reduced ejection fraction, HTN, atrial fibrillation presents to the ER after he had a fall at home. Patient states that he was walking to his house when he tripped however his son states patient became weak and let himself fall down to the floor 12/18/2019 Assessment/Plan: Hypotension likely cardiogenic shock  - improving - Monitor I&Os - Keep MAP >65. 
- cardiology on board 
- on dobutamine gtt - IV albumin x  Doses  
  
Acute on chronic systolic heart failure NYHA class 3 on poa - improving -  noted to have 3+ edema on exam  
- probnp >10,000 
- CXR: Small bilateral effusions, right greater than left, without change. - Strict I&Os, Daily weight - Cardiology on board 
- echo recently done at cardiology office showed  stable LVEF 
- started on IV bumex 1mg bid Acute kidney injury - improving  
- cr 2.35 on poa, baseline 1.03 
-likely  pre renal azotemia from CRS, IV volume depletion and Hypotension  
- nephrology on board 
- renal us : No gross hydronephrosis or large shadowing calculus. - Monitor renal function. 
  
Elevated Troponin 
- in the setting of renal dysfunction. Could be precipitated by HF exac - No chest pain at this time. - troponin flat  
  
Rash  - improving Unclear etiology  
- no itching today. Will stop IV steroids HLD- statin BPH- tamsulosin Depression- zoloft Palliative care consulted Code status: DNR 
DVT prophylaxis: Xarelto Disposition: TBD 
---------------------------------------------- 
 
CC: hypotension S: Patient is seen and examined at bedside. No family present. Patient feels better and wants to go home. Explained that he is on dobutamine drip which is helping his BP but patient not comprehending. Discussed with nursing. No overnight events Review of Systems: A comprehensive review of systems was negative. O: 
Visit Vitals /75 Pulse (!) 109 Temp 97.2 °F (36.2 °C) Resp 18 Ht 5' 10\" (1.778 m) Wt 97.9 kg (215 lb 13.3 oz) SpO2 94% BMI 30.97 kg/m² PHYSICAL EXAM: 
Gen: NAD,elderly HEENT: anicteric sclerae, normal conjunctiva, oropharynx clear, MM moist 
Neck: supple, trachea midline, no adenopathy Heart: RRR, 3+ peripheral edema Lungs: decreased breath sounds at bases R>L Abd: soft, NT, ND, BS+, no organomegaly Extr: warm Skin: dry, no rash Neuro: Mild confusion, normal speech, moves all extremities Psych: normal mood, appropriate affect Intake/Output Summary (Last 24 hours) at 12/20/2019 1143 Last data filed at 12/20/2019 0000 Gross per 24 hour Intake 261.33 ml Output 450 ml Net -188.67 ml Recent labs & imaging reviewed: 
Recent Results (from the past 24 hour(s)) CHLORIDE, URINE RANDOM Collection Time: 12/19/19  5:14 PM  
Result Value Ref Range Chloride,urine random 14 MMOL/L  
SODIUM, UR, RANDOM Collection Time: 12/19/19  5:14 PM  
Result Value Ref Range Sodium,urine random 19 MMOL/L  
CREATININE, UR, RANDOM Collection Time: 12/19/19  5:14 PM  
Result Value Ref Range Creatinine, urine 193.00 mg/dL CBC WITH AUTOMATED DIFF Collection Time: 12/20/19  3:00 AM  
Result Value Ref Range WBC 3.6 (L) 4.1 - 11.1 K/uL  
 RBC 3.47 (L) 4.10 - 5.70 M/uL  
 HGB 11.2 (L) 12.1 - 17.0 g/dL HCT 32.7 (L) 36.6 - 50.3 % MCV 94.2 80.0 - 99.0 FL  
 MCH 32.3 26.0 - 34.0 PG  
 MCHC 34.3 30.0 - 36.5 g/dL  
 RDW 18.6 (H) 11.5 - 14.5 % PLATELET 56 (L) 704 - 400 K/uL MPV 9.9 8.9 - 12.9 FL  
 NRBC 0.0 0  WBC ABSOLUTE NRBC 0.00 0.00 - 0.01 K/uL NEUTROPHILS 89 (H) 32 - 75 % LYMPHOCYTES 9 (L) 12 - 49 % MONOCYTES 1 (L) 5 - 13 % EOSINOPHILS 0 0 - 7 % BASOPHILS 0 0 - 1 % IMMATURE GRANULOCYTES 1 (H) 0.0 - 0.5 % ABS. NEUTROPHILS 3.3 1.8 - 8.0 K/UL ABS. LYMPHOCYTES 0.3 (L) 0.8 - 3.5 K/UL  
 ABS. MONOCYTES 0.0 0.0 - 1.0 K/UL  
 ABS. EOSINOPHILS 0.0 0.0 - 0.4 K/UL  
 ABS. BASOPHILS 0.0 0.0 - 0.1 K/UL  
 ABS. IMM. GRANS. 0.0 0.00 - 0.04 K/UL  
 DF SMEAR SCANNED    
 RBC COMMENTS OVALOCYTES PRESENT 
    
 RBC COMMENTS ANISOCYTOSIS 1+ 
    
 RBC COMMENTS TARGET CELLS 
PRESENT 
    
RENAL FUNCTION PANEL Collection Time: 12/20/19  3:00 AM  
Result Value Ref Range Sodium 140 136 - 145 mmol/L Potassium 4.8 3.5 - 5.1 mmol/L Chloride 107 97 - 108 mmol/L  
 CO2 25 21 - 32 mmol/L Anion gap 8 5 - 15 mmol/L Glucose 101 (H) 65 - 100 mg/dL BUN 36 (H) 6 - 20 MG/DL Creatinine 1.83 (H) 0.70 - 1.30 MG/DL  
 BUN/Creatinine ratio 20 12 - 20 GFR est AA 42 (L) >60 ml/min/1.73m2 GFR est non-AA 35 (L) >60 ml/min/1.73m2 Calcium 8.8 8.5 - 10.1 MG/DL Phosphorus 4.1 2.6 - 4.7 MG/DL Albumin 3.4 (L) 3.5 - 5.0 g/dL Recent Labs  
  12/20/19 0300 12/19/19 0336 WBC 3.6* 3.6* HGB 11.2* 11.4* HCT 32.7* 33.7*  
PLT 56* 69* Recent Labs  
  12/20/19 0300 12/19/19 0336 12/18/19 2231  140 141  
K 4.8 4.3 4.5  
 107 105 CO2 25 27 29 BUN 36* 39* 38* CREA 1.83* 2.21* 2.35* * 94 95  
CA 8.8 8.6 9.2 MG  --  2.1  --   
PHOS 4.1  --   --   
 
Recent Labs  
  12/20/19 
0300 12/18/19 2231 SGOT  --  31 ALT  --  22  
AP  --  98  
TBILI  --  1.4* TP  --  7.2 ALB 3.4* 3.3*  
GLOB  --  3.9 No results for input(s): INR, PTP, APTT, INREXT in the last 72 hours. No results for input(s): FE, TIBC, PSAT, FERR in the last 72 hours. Lab Results Component Value Date/Time Folate 10.1 11/25/2010 12:15 AM  
  
No results for input(s): PH, PCO2, PO2 in the last 72 hours. Recent Labs 12/19/19 
0336 12/18/19 
2231 TROIQ 0.07* 0.07* Lab Results Component Value Date/Time  Cholesterol, total 114 11/25/2010 04:00 AM  
 HDL Cholesterol 53 11/25/2010 04:00 AM  
 LDL, calculated 50.2 11/25/2010 04:00 AM  
 Triglyceride 54 11/25/2010 04:00 AM  
 CHOL/HDL Ratio 2.2 11/25/2010 04:00 AM  
 
No results found for: Ilana Chow Lab Results Component Value Date/Time Color DARK YELLOW 12/19/2019 03:36 AM  
 Appearance CLEAR 12/19/2019 03:36 AM  
 Specific gravity 1.020 12/19/2019 03:36 AM  
 Specific gravity 1.020 01/02/2016 01:31 PM  
 pH (UA) 5.0 12/19/2019 03:36 AM  
 Protein 30 (A) 12/19/2019 03:36 AM  
 Glucose NEGATIVE  12/19/2019 03:36 AM  
 Ketone TRACE (A) 12/19/2019 03:36 AM  
 Bilirubin NEGATIVE  12/19/2019 03:36 AM  
 Urobilinogen 1.0 12/19/2019 03:36 AM  
 Nitrites NEGATIVE  12/19/2019 03:36 AM  
 Leukocyte Esterase TRACE (A) 12/19/2019 03:36 AM  
 Epithelial cells FEW 12/19/2019 03:36 AM  
 Bacteria NEGATIVE  12/19/2019 03:36 AM  
 WBC 5-10 12/19/2019 03:36 AM  
 RBC 0-5 12/19/2019 03:36 AM  
 
 
Med list reviewed Current Facility-Administered Medications Medication Dose Route Frequency  bumetanide (BUMEX) injection 1 mg  1 mg IntraVENous Q12H  
 DOBUTamine (DOBUTREX) 500 mg/250 mL (2,000 mcg/mL) infusion  2.5 mcg/kg/min IntraVENous CONTINUOUS  
 balsam peru-castor oil (VENELEX) ointment   Topical Q8H  
 sodium chloride (NS) flush 5-40 mL  5-40 mL IntraVENous Q8H  
 sodium chloride (NS) flush 5-40 mL  5-40 mL IntraVENous PRN  
 diphenhydrAMINE (BENADRYL) injection 25 mg  25 mg IntraVENous Q6H PRN  
 HYDROcodone-acetaminophen (NORCO) 5-325 mg per tablet 1 Tab  1 Tab Oral Q4H PRN  
 sertraline (ZOLOFT) tablet 50 mg  50 mg Oral DAILY  simvastatin (ZOCOR) tablet 20 mg  20 mg Oral QHS  tamsulosin (FLOMAX) capsule 0.4 mg  0.4 mg Oral DAILY  rivaroxaban (XARELTO) tablet 15 mg  15 mg Oral DAILY WITH DINNER Care Plan discussed with:  Patient/Family and Nurse Latricia Jurado MD 
Internal Medicine Date of Service: 12/20/2019

## 2019-12-20 NOTE — ACP (ADVANCE CARE PLANNING)
GOALS OF CARE: 
Patient/Health Care Proxy Stated Goals: Comfort Pt's son Lisa Singletary expresses desire to not see his Father suffer and to experience a peaceful natural death. Current goals based on discussion with Lisa Singletary are to optimize his current status here in the hospital on dobutamine for another couple of days; prior to discharge home OFF dobutamine, at which point he would enroll in Hospice at home. Consult to case management for Hospice placed - intention for informational prior to discharge 
  
TREATMENT PREFERENCES:  
Code Status: DNR- sounds like he has a DDNR at home (paper taped to wall for EMS to see) 
  Advance Care Planning: 
[x]? [facility] Interdisciplinary Team has updated the ACP Navigator with Health Care Decision Maker and Patient Capacity 
  
  Primary Decision Maker: Jing Calhoun Child - [phone] Primary Decision MakerFtania Deck - [phone]  
  
Advance Care Planning 12/19/2019 Patient's Healthcare Decision Maker is: - Confirm Advance Directive Yes, on file Does the patient have other document types Do Not Resuscitate; Power of   
  
  
Medical Interventions: Limited additional interventions

## 2019-12-20 NOTE — CDMP QUERY
#1 
 
Pt admitted with Hypotension/shock /Pt noted to have HX of CHF and cardiomyopathy If possible, please document in the progress notes and d/c summary if you are evaluating and / or treating any of the following: ? Acute Systolic CHF POA ? Chronic Systolic CHF 
? Other, please specify ? Clinically unable to determine The medical record reflects the following: 
  Risk Factors: Cardiomyopathy/ CHF Clinical Indicators: H/P- 
Jyoti Jenkins is a 80 y.o. male with congestive heart failure reduced ejection fraction, HTN, atrial fibrillation presents to the ER after he had a fall at home HF NYHA class 3- h/o HF with EF 35% as per report. noted to have edema on exam and c/o SOB but respiratory status at baseline. Worsening probnp.  
- hold diuretics for now given hypotension and renal failure. - Strict I&Os 
- Daily weight - Cardiology consult. 12/18/2019 22:31 NT pro-BNP: 10,537 (H) Treatment: cardiology consult,hold diuretics due to hypotension, daily weights,I/O Thank you, 
       Kali Huizar [facility], [address] [facility]

## 2019-12-20 NOTE — PROGRESS NOTES
Reason for Admission:   Fall at home. Patient has history of CHF, HTN and atrial fibrillation. RRAT Score:   18-Moderate Do you (patient/family) have any concerns for transition/discharge? None reported. Plan for utilizing home health:  TBD Current Advanced Directive/Advance Care Plan:  Patient has an AMD on file and is a DNR-Son Cayden Soriano reports that patient has a DDNR and he will provide copy. Transition of Care Plan:  CM met with patient's son Velia Grissom who is present at the bedside. Patient reportedly lives with his son Olegario Suarez and his wife and son. He lives on the first floor and there are two steps to enter the home. Patient was using a cane at baseline prior to hospital admission and according to son had been functioning independently until about six weeks ago when his functioning began to decline. Patient was receiving private duty care with an agency  (doesn't recall name) but this has ended as it was too expensive. Currently, the family has someone that will be serving as a  to the patient during the week. Cayden Soriano, himself, states that he is retired and will be available to help out as much as needed. CM provided hospice list. The plan is for patient to possibly DC home with hospice beginning of next week. Family wants to further research some of the choices and will inform care management so that referral can be initiated. CM encouraged Cayden Soriano to have choice by tomorrow if possible. Patient remains on Dobutamine. CM to monitor.   
 
Maria D Singh MS

## 2019-12-21 NOTE — PROGRESS NOTES
Problem: Falls - Risk of 
Goal: *Absence of Falls Description Document Ofelia Rodriguezshire Fall Risk and appropriate interventions in the flowsheet. Outcome: Progressing Towards Goal 
Note: Fall Risk Interventions: 
Mental status prevents appropriate knowledge of fall prevention. Bed alarm in place, turned on, and audible from the nurses station. Frequent purposeful rounding initiated by staff. Problem: Pressure Injury - Risk of 
Goal: *Prevention of pressure injury Description Document Kadeem Scale and appropriate interventions in the flowsheet. Outcome: Progressing Towards Goal 
Note: Pressure Injury Interventions: 
Pt turned every two hours, moisture barrier applied, heels elevated, pillows and blankets used, pressure redistributing mattress, linens dry. 1930 Bedside shift change report given to Ina (oncoming nurse) by Rex Gavin (offgoing nurse). Report included the following information SBAR, Kardex, Intake/Output, MAR, Accordion and Recent Results.

## 2019-12-21 NOTE — PROGRESS NOTES
[facility] 
 [address] [address], [address] [facility] Phone: [phone]   [phone]   
  
Nephrology Progress Note Judith Covarrubias     1/1/1929     [id number] Date of Admission : 12/18/2019 12/21/19 CC:  Follow up for ARF Assessment and Plan ASHLYN : 
- pre renal azotemia from CRS, IV volume depletion and Hypotension  
- Non oliguric  
- Renal US :\" No Obstruction . Pre renal urine lytes - Continue to hold ACE/ARB 
- continue  Bumex 1 mg IV BID  
- Dobutamine per cardiology , not working and so will go on Hospice - Not a dialysis candidate - Keep Morales  
  
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  
- Chronic A fib - On Dobutamine gtt per cards  And not responding and so will go on hospice 
- cards not noted  
  
Skin Rash : 
- ? Etiology  
  
Chronic Thrombocytopenia : ? 2/2 MR Leucopenia Anemia  
  
DNR Interval History: 
Seen and examined  
 pt will go on Hospice Can keep the morales in as pt has urinary retension Review of Systems: Review of systems not obtained due to patient factors. Current Medications:  
Current Facility-Administered Medications Medication Dose Route Frequency  [Held by provider] bumetanide (BUMEX) injection 1 mg  1 mg IntraVENous Q12H  
 DOBUTamine (DOBUTREX) 500 mg/250 mL (2,000 mcg/mL) infusion  2.5 mcg/kg/min IntraVENous CONTINUOUS  
 balsam peru-castor oil (VENELEX) ointment   Topical Q8H  
 sodium chloride (NS) flush 5-40 mL  5-40 mL IntraVENous Q8H  
 sodium chloride (NS) flush 5-40 mL  5-40 mL IntraVENous PRN  
 diphenhydrAMINE (BENADRYL) injection 25 mg  25 mg IntraVENous Q6H PRN  
 HYDROcodone-acetaminophen (NORCO) 5-325 mg per tablet 1 Tab  1 Tab Oral Q4H PRN  
 sertraline (ZOLOFT) tablet 50 mg  50 mg Oral DAILY  simvastatin (ZOCOR) tablet 20 mg  20 mg Oral QHS  tamsulosin (FLOMAX) capsule 0.4 mg  0.4 mg Oral DAILY  rivaroxaban (XARELTO) tablet 15 mg  15 mg Oral DAILY WITH DINNER Allergies Allergen Reactions  Prednisone Other (comments) Bloats him Objective: 
Vitals:   
Vitals:  
 12/21/19 0630 12/21/19 0900 12/21/19 0945 12/21/19 1100 BP:  100/63 97/62 117/61 Pulse:  (!) 124 (!) 110 (!) 101 Resp:  25 19 Temp:  98 °F (36.7 °C)  97.3 °F (36.3 °C) SpO2:  97% Weight: 99.9 kg (220 lb 3.8 oz) Height:      
 
Intake and Output: 
No intake/output data recorded. 12/19 1901 - 12/21 0700 In: 261.3 [P.O.:100; I.V.:161.3] Out: 1250 [[id number]] Physical Examination: 
o General: Appears stated age, agitated Neck:  JVD + Resp:  DIMINISHED at bases CV:  Tachy, irregular, Jerry systolic murmur , 3+ LE edema GI:  Soft, NT, + BS Neurologic:  Non focal 
 : morales + Skin : macular rash on trunk + [x]    High complexity decision making was performed 
[x]    Patient is at high-risk of decompensation with multiple organ involvement Lab Data Personally Reviewed: I have reviewed all the pertinent labs, microbiology data and radiology studies during assessment. Recent Labs  
  12/21/19 0445 12/20/19 0300 12/19/19 0336 12/18/19 2231  140 140 141  
K 4.7 4.8 4.3 4.5  
 107 107 105 CO2 26 25 27 29 * 101* 94 95 BUN 43* 36* 39* 38* CREA 2.21* 1.83* 2.21* 2.35* CA 9.4 8.8 8.6 9.2 MG  --   --  2.1  --   
PHOS  --  4.1  --   --   
ALB  --  3.4*  --  3.3* SGOT  --   --   --  31 ALT  --   --   --  22 Recent Labs  
  12/21/19 0445 12/20/19 
0300 12/19/19 
0336 12/18/19 
2231 WBC 8.6 3.6* 3.6* 4.7 HGB 13.2 11.2* 11.4* 12.5 HCT 38.8 32.7* 33.7* 38.0  
PLT 89* 56* 69* 76* No results found for: SDES Lab Results Component Value Date/Time  Culture result: MRSA NOT PRESENT 12/19/2019 10:40 PM  
 Culture result:  12/19/2019 10:40 PM  
 Screening of patient nares for MRSA is for surveillance purposes and, if positive, to facilitate isolation considerations in high risk settings. It is not intended for automatic decolonization interventions per se as regimens are not sufficiently effective to warrant routine use. Culture result: NO GROWTH 5 DAYS 10/27/2019 10:01 PM  
 Culture result: NO GROWTH 1 DAY 01/02/2016 01:31 PM  
 Culture result: MRSA NOT PRESENT 12/27/2015 10:31 PM  
 Culture result:  12/27/2015 10:31 PM  
      Screening of patient nares for MRSA is for surveillance purposes and, if positive, to facilitate isolation considerations in high risk settings. It is not intended for automatic decolonization interventions per se as regimens are not sufficiently effective to warrant routine use. Recent Results (from the past 24 hour(s)) METABOLIC PANEL, BASIC Collection Time: 12/21/19  4:45 AM  
Result Value Ref Range Sodium 137 136 - 145 mmol/L Potassium 4.7 3.5 - 5.1 mmol/L Chloride 105 97 - 108 mmol/L  
 CO2 26 21 - 32 mmol/L Anion gap 6 5 - 15 mmol/L Glucose 116 (H) 65 - 100 mg/dL BUN 43 (H) 6 - 20 MG/DL Creatinine 2.21 (H) 0.70 - 1.30 MG/DL  
 BUN/Creatinine ratio 19 12 - 20 GFR est AA 34 (L) >60 ml/min/1.73m2 GFR est non-AA 28 (L) >60 ml/min/1.73m2 Calcium 9.4 8.5 - 10.1 MG/DL  
NT-PRO BNP Collection Time: 12/21/19  4:45 AM  
Result Value Ref Range NT pro-BNP 25,380 (H) <450 PG/ML  
CBC W/O DIFF Collection Time: 12/21/19  4:45 AM  
Result Value Ref Range WBC 8.6 4.1 - 11.1 K/uL  
 RBC 4.13 4.10 - 5.70 M/uL  
 HGB 13.2 12.1 - 17.0 g/dL HCT 38.8 36.6 - 50.3 % MCV 93.9 80.0 - 99.0 FL  
 MCH 32.0 26.0 - 34.0 PG  
 MCHC 34.0 30.0 - 36.5 g/dL  
 RDW 19.1 (H) 11.5 - 14.5 % PLATELET 89 (L) 432 - 400 K/uL MPV 11.0 8.9 - 12.9 FL  
 NRBC 0.0 0  WBC ABSOLUTE NRBC 0.00 0.00 - 0.01 K/uL Total time spent with patient:30   min. Care Plan discussed with: 
Patient  y Family  y   
RN   y  
Consulting Physician [id number] [facility]     
 
I have reviewed the flowsheets. Chart and Pertinent Notes have been reviewed. No change in PMH ,family and social history from Consult note.  
 
 
Ruddy Calvin MD

## 2019-12-21 NOTE — PROGRESS NOTES
Cardiology Progress Note                                        Admit Date: 12/18/2019 Assessment/Plan: 1.  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 LVEF 35%  
 Started dobutamine, 2. Permanent afib 3. ICD :   
4.  Hospice:  ICD to be turned off ( I contacted [employer]) Fabricio Hunt is a 80 y.o. male with PROBLEM LIST: 
Patient Active Problem List  
 Diagnosis Date Noted  Hypotension 12/19/2019  Hematuria 12/30/2015  Fecal impaction ([address] 12/27/2015  Hypertension 12/27/2015  Urinary retention 12/27/2015  Rectal bleed 07/19/2015  Lower GI bleed 07/18/2015  Nausea & vomiting 11/25/2010 Subjective:  
 
Fabricio Hunt admits to fatigue. Visit Vitals /61 (BP 1 Location: Right arm, BP Patient Position: At rest) Pulse (!) 101 Temp 97.9 °F (36.6 °C) Resp 19 Ht 5' 10\" (1.778 m) Wt 220 lb 3.8 oz (99.9 kg) SpO2 97% BMI 31.60 kg/m² Intake/Output Summary (Last 24 hours) at 12/21/2019 1128 Last data filed at 12/21/2019 4887 Gross per 24 hour Intake  Output 800 ml Net -800 ml Objective:  
  
Physical Exam: 
HEENT: Perrla, EOMI Neck: No JVD,  No thyroidmegaly Resp: CTA bilaterally; No wheezes or rales CV: RRR s1s2 No murmur no s3 Abd:Soft, Nontender Ext: No edema Neuro: Alert and oriented; Nonfocal 
Skin: Warm, Dry, Intact Pulses: 2+ DP/PT/Rad Telemetry: AFIB Current Facility-Administered Medications Medication Dose Route Frequency  bumetanide (BUMEX) injection 1 mg  1 mg IntraVENous Q12H  
 DOBUTamine (DOBUTREX) 500 mg/250 mL (2,000 mcg/mL) infusion  2.5 mcg/kg/min IntraVENous CONTINUOUS  
 balsam peru-castor oil (VENELEX) ointment   Topical Q8H  
 sodium chloride (NS) flush 5-40 mL  5-40 mL IntraVENous Q8H  
 sodium chloride (NS) flush 5-40 mL  5-40 mL IntraVENous PRN  
 diphenhydrAMINE (BENADRYL) injection 25 mg  25 mg IntraVENous Q6H PRN  
  HYDROcodone-acetaminophen (NORCO) 5-325 mg per tablet 1 Tab  1 Tab Oral Q4H PRN  
 sertraline (ZOLOFT) tablet 50 mg  50 mg Oral DAILY  simvastatin (ZOCOR) tablet 20 mg  20 mg Oral QHS  tamsulosin (FLOMAX) capsule 0.4 mg  0.4 mg Oral DAILY  rivaroxaban (XARELTO) tablet 15 mg  15 mg Oral DAILY WITH DINNER Data Review:  
Labs:   
Recent Results (from the past 24 hour(s)) METABOLIC PANEL, BASIC Collection Time: 12/21/19  4:45 AM  
Result Value Ref Range Sodium 137 136 - 145 mmol/L Potassium 4.7 3.5 - 5.1 mmol/L Chloride 105 97 - 108 mmol/L  
 CO2 26 21 - 32 mmol/L Anion gap 6 5 - 15 mmol/L Glucose 116 (H) 65 - 100 mg/dL BUN 43 (H) 6 - 20 MG/DL Creatinine 2.21 (H) 0.70 - 1.30 MG/DL  
 BUN/Creatinine ratio 19 12 - 20 GFR est AA 34 (L) >60 ml/min/1.73m2 GFR est non-AA 28 (L) >60 ml/min/1.73m2 Calcium 9.4 8.5 - 10.1 MG/DL  
NT-PRO BNP Collection Time: 12/21/19  4:45 AM  
Result Value Ref Range NT pro-BNP 25,380 (H) <450 PG/ML  
CBC W/O DIFF Collection Time: 12/21/19  4:45 AM  
Result Value Ref Range WBC 8.6 4.1 - 11.1 K/uL  
 RBC 4.13 4.10 - 5.70 M/uL  
 HGB 13.2 12.1 - 17.0 g/dL HCT 38.8 36.6 - 50.3 % MCV 93.9 80.0 - 99.0 FL  
 MCH 32.0 26.0 - 34.0 PG  
 MCHC 34.0 30.0 - 36.5 g/dL  
 RDW 19.1 (H) 11.5 - 14.5 % PLATELET 89 (L) 683 - 400 K/uL MPV 11.0 8.9 - 12.9 FL  
 NRBC 0.0 0  WBC ABSOLUTE NRBC 0.00 0.00 - 0.01 K/uL

## 2019-12-21 NOTE — PROGRESS NOTES
Problem: Heart Failure: Day 1 Goal: Medications Outcome: Progressing Towards Goal 
  
Pt heart rhythm paced, . Education provided on Dobutamine gtt. Pt and family verbalized understanding. Opportunity for questions provided. Problem: Pressure Injury - Risk of 
Goal: *Prevention of pressure injury Description Document Kadeem Scale and appropriate interventions in the flowsheet. Outcome: Progressing Towards Goal 
Note: Pressure Injury Interventions: 
Sensory Interventions: Assess changes in LOC, Keep linens dry and wrinkle-free, Minimize linen layers, Pressure redistribution bed/mattress (bed type) Moisture Interventions: Absorbent underpads, Check for incontinence Q2 hours and as needed, Internal/External urinary devices, Minimize layers Activity Interventions: Increase time out of bed, Pressure redistribution bed/mattress(bed type) Mobility Interventions: HOB 30 degrees or less, Pressure redistribution bed/mattress (bed type) Nutrition Interventions: Offer support with meals,snacks and hydration Friction and Shear Interventions: HOB 30 degrees or less, Minimize layers Last 3 Recorded Weights in this Encounter 12/19/19 1158 12/20/19 0318 12/21/19 0630 Weight: 99.9 kg (220 lb 3.8 oz) 97.9 kg (215 lb 13.3 oz) 99.9 kg (220 lb 3.8 oz) Bedside shift change report given to Heidy Quan RN (oncoming nurse) by Gladis Prather RN (offgoing nurse). Report included the following information SBAR, Kardex, Intake/Output, MAR, Recent Results and Cardiac Rhythm Paced.

## 2019-12-21 NOTE — PROGRESS NOTES
IVETTE: 
CM reviewed chart and spoke with hospitalist about patient. Pt's [facility] CM [facility] referral through [facility] to [facility]. CM will continue to follow. Mk Donato

## 2019-12-21 NOTE — PROGRESS NOTES
Hospitalist Progress Note Hospital summary: Earl Rubinstein a 80 y. o. male with congestive heart failure reduced ejection fraction, HTN, atrial fibrillation presents to the ER after he had a fall at home. Patient states that he was walking to his house when he tripped however his son states patient became weak and let himself fall down to the floor 12/18/2019 Assessment/Plan: Hypotension likely cardiogenic shock  - improving - Monitor I&Os - Keep MAP >65. 
- cardiology on board 
- on dobutamine gtt - IV albumin x 3 Doses  
  
Acute on chronic systolic heart failure NYHA class 3 on poa - not improving -  noted to have 3+ edema on exam  
- probnp >10,000 on poa worsened >25,000 today - CXR: Small bilateral effusions, right greater than left, without change. - Strict I&Os, Daily weight - Cardiology on board 
- echo recently done at cardiology office showed  stable LVEF 
- will hold IV bumex today Acute kidney injury - worsening  
- cr 2.35 on poa, baseline 1.03 
-likely  pre renal azotemia from CRS, IV volume depletion and Hypotension  
- nephrology on board 
- renal us : No gross hydronephrosis or large shadowing calculus. - Monitor renal function. 
  
Elevated Troponin 
- in the setting of renal dysfunction. Could be precipitated by HF exac - No chest pain at this time. - troponin flat HLD- statin BPH- tamsulosin Depression- zoloft Appreciate Palliative care input. \" Plan is to optimize patient current status here in the hospital on dobutamine for another couple of days; prior to discharge home OFF dobutamine, at which point he would enroll in Hospice at home. \" 
 
Explained in detail today with patient and his son at bedside. Expressed concerns that dobutamine drip is not helping him. Patient and family understood. They want to enroll in hospice and take him tomorrow if possible. [facility] and CM aware of this Code status: DNR 
DVT prophylaxis: Xarelto Disposition: TBD. Possible home with hospice tomorrow  
---------------------------------------------- 
 
CC: hypotension S: Patient is seen and examined at bedside. Son and daughter in law present. Patient does not feel better today. Son is concerned. Explained that his BNP and Cr have worsened today. Expressed that dobutamine drip may not be helping him. Family decided [facility] hospice Discussed with nursing. No overnight events Review of Systems: A comprehensive review of systems was negative. O: 
Visit Vitals /61 (BP 1 Location: Right arm, BP Patient Position: At rest) Pulse (!) 101 Temp 97.9 °F (36.6 °C) Resp 19 Ht 5' 10\" (1.778 m) Wt 99.9 kg (220 lb 3.8 oz) SpO2 97% BMI 31.60 kg/m² PHYSICAL EXAM: 
Gen: NAD,elderly HEENT: anicteric sclerae, normal conjunctiva, oropharynx clear, MM moist 
Neck: supple, trachea midline, no adenopathy Heart: RRR, 3+ peripheral edema Lungs: decreased breath sounds at bases R>L Abd: soft, NT, ND, BS+, no organomegaly Extr: warm Skin: dry, no rash Neuro: Mild confusion, normal speech, moves all extremities Psych: normal mood, appropriate affect Intake/Output Summary (Last 24 hours) at 12/21/2019 1147 Last data filed at 12/21/2019 1676 Gross per 24 hour Intake  Output 800 ml Net -800 ml Recent labs & imaging reviewed: 
Recent Results (from the past 24 hour(s)) METABOLIC PANEL, BASIC Collection Time: 12/21/19  4:45 AM  
Result Value Ref Range Sodium 137 136 - 145 mmol/L Potassium 4.7 3.5 - 5.1 mmol/L Chloride 105 97 - 108 mmol/L  
 CO2 26 21 - 32 mmol/L Anion gap 6 5 - 15 mmol/L Glucose 116 (H) 65 - 100 mg/dL BUN 43 (H) 6 - 20 MG/DL Creatinine 2.21 (H) 0.70 - 1.30 MG/DL  
 BUN/Creatinine ratio 19 12 - 20 GFR est AA 34 (L) >60 ml/min/1.73m2 GFR est non-AA 28 (L) >60 ml/min/1.73m2  Calcium 9.4 8.5 - 10.1 MG/DL  
NT-PRO BNP  
 Collection Time: 12/21/19  4:45 AM  
Result Value Ref Range NT pro-BNP 25,380 (H) <450 PG/ML  
CBC W/O DIFF Collection Time: 12/21/19  4:45 AM  
Result Value Ref Range WBC 8.6 4.1 - 11.1 K/uL  
 RBC 4.13 4.10 - 5.70 M/uL  
 HGB 13.2 12.1 - 17.0 g/dL HCT 38.8 36.6 - 50.3 % MCV 93.9 80.0 - 99.0 FL  
 MCH 32.0 26.0 - 34.0 PG  
 MCHC 34.0 30.0 - 36.5 g/dL  
 RDW 19.1 (H) 11.5 - 14.5 % PLATELET 89 (L) 601 - 400 K/uL MPV 11.0 8.9 - 12.9 FL  
 NRBC 0.0 0  WBC ABSOLUTE NRBC 0.00 0.00 - 0.01 K/uL Recent Labs  
  12/21/19 0445 12/20/19 
0300 WBC 8.6 3.6* HGB 13.2 11.2* HCT 38.8 32.7* PLT 89* 56* Recent Labs  
  12/21/19 
0445 12/20/19 
0300 12/19/19 
5583  140 140  
K 4.7 4.8 4.3  107 107 CO2 26 25 27 BUN 43* 36* 39* CREA 2.21* 1.83* 2.21* * 101* 94  
CA 9.4 8.8 8.6 MG  --   --  2.1 PHOS  --  4.1  --   
 
Recent Labs  
  12/20/19 
0300 12/18/19 
2231 SGOT  --  31 ALT  --  22  
AP  --  98  
TBILI  --  1.4* TP  --  7.2 ALB 3.4* 3.3*  
GLOB  --  3.9 No results for input(s): INR, PTP, APTT, INREXT, INREXT in the last 72 hours. No results for input(s): FE, TIBC, PSAT, FERR in the last 72 hours. Lab Results Component Value Date/Time Folate 10.1 11/25/2010 12:15 AM  
  
No results for input(s): PH, PCO2, PO2 in the last 72 hours. Recent Labs 12/19/19 
0336 12/18/19 
2231 TROIQ 0.07* 0.07* Lab Results Component Value Date/Time Cholesterol, total 114 11/25/2010 04:00 AM  
 HDL Cholesterol 53 11/25/2010 04:00 AM  
 LDL, calculated 50.2 11/25/2010 04:00 AM  
 Triglyceride 54 11/25/2010 04:00 AM  
 CHOL/HDL Ratio 2.2 11/25/2010 04:00 AM  
 
No results found for: Nav Phillips Lab Results Component Value Date/Time  Color DARK YELLOW 12/19/2019 03:36 AM  
 Appearance CLEAR 12/19/2019 03:36 AM  
 Specific gravity 1.020 12/19/2019 03:36 AM  
 Specific gravity 1.020 01/02/2016 01:31 PM  
 pH (UA) 5.0 12/19/2019 03:36 AM  
 Protein 30 (A) 12/19/2019 03:36 AM  
 Glucose NEGATIVE  12/19/2019 03:36 AM  
 Ketone TRACE (A) 12/19/2019 03:36 AM  
 Bilirubin NEGATIVE  12/19/2019 03:36 AM  
 Urobilinogen 1.0 12/19/2019 03:36 AM  
 Nitrites NEGATIVE  12/19/2019 03:36 AM  
 Leukocyte Esterase TRACE (A) 12/19/2019 03:36 AM  
 Epithelial cells FEW 12/19/2019 03:36 AM  
 Bacteria NEGATIVE  12/19/2019 03:36 AM  
 WBC 5-10 12/19/2019 03:36 AM  
 RBC 0-5 12/19/2019 03:36 AM  
 
 
Med list reviewed Current Facility-Administered Medications Medication Dose Route Frequency  bumetanide (BUMEX) injection 1 mg  1 mg IntraVENous Q12H  
 DOBUTamine (DOBUTREX) 500 mg/250 mL (2,000 mcg/mL) infusion  2.5 mcg/kg/min IntraVENous CONTINUOUS  
 balsam peru-castor oil (VENELEX) ointment   Topical Q8H  
 sodium chloride (NS) flush 5-40 mL  5-40 mL IntraVENous Q8H  
 sodium chloride (NS) flush 5-40 mL  5-40 mL IntraVENous PRN  
 diphenhydrAMINE (BENADRYL) injection 25 mg  25 mg IntraVENous Q6H PRN  
 HYDROcodone-acetaminophen (NORCO) 5-325 mg per tablet 1 Tab  1 Tab Oral Q4H PRN  
 sertraline (ZOLOFT) tablet 50 mg  50 mg Oral DAILY  simvastatin (ZOCOR) tablet 20 mg  20 mg Oral QHS  tamsulosin (FLOMAX) capsule 0.4 mg  0.4 mg Oral DAILY  rivaroxaban (XARELTO) tablet 15 mg  15 mg Oral DAILY WITH DINNER Care Plan discussed with:  Patient/Family and Nurse Tosha Hernandez MD 
Internal Medicine Date of Service: 12/21/2019

## 2019-12-22 PROBLEM — I50.20 HEART FAILURE WITH REDUCED EJECTION FRACTION, NYHA CLASS III (HCC): Status: ACTIVE | Noted: 2019-01-01

## 2019-12-22 NOTE — PROGRESS NOTES
Spiritual Care Partner Volunteer visited patient in Rm [id number] on 12/22/19. Documented by: Chaplain Grier MDiv, MACE 
[phone] ([id number])

## 2019-12-22 NOTE — PROGRESS NOTES
Hospitalist Progress Note Hospital summary: Jose A Garcia a 80 y. o. male with congestive heart failure reduced ejection fraction, HTN, atrial fibrillation presents to the ER after he had a fall at home. Patient states that he was walking to his house when he tripped however his son states patient became weak and let himself fall down to the floor 12/18/2019 Assessment/Plan: Hypotension likely cardiogenic shock  - improving - Monitor I&Os - Keep MAP >65. 
- appreciate cardiology input  
- off dobutamine gtt 
- s/p IV albumin x 3 Doses  
  
Acute on chronic systolic heart failure NYHA class 3 on poa - not improving -  noted to have 3+ edema on exam  
- probnp >10,000 on poa worsened >25,000 on12/21 
- CXR: Small bilateral effusions, right greater than left, without change. - Strict I&Os, Daily weight - Cardiology on board  
- echo recently done at cardiology office showed  stable LVEF 
- pt c/w sob, 1 dose of IV lasix 20mg given Acute kidney injury - worsening  
- cr 2.35 on poa, baseline 1.03 
-likely  pre renal azotemia from CRS, IV volume depletion and Hypotension  
- nephrology on board 
- renal us : No gross hydronephrosis or large shadowing calculus. - Monitor renal function. 
  
Elevated Troponin 
- in the setting of renal dysfunction. Could be precipitated by HF exac - No chest pain at this time. - troponin flat Afib - xarelto started by cardiology 12/19. Discontinued Xarelto today for nose bleeds Epistaxis - 3 episodes since this am, increasing in duration, last episode lasted >20min - Hx epistaxis in the past s/p cauterization - INR 2.5  
- ENT consulted and discussed with Dr. Moreno 
- started on afrin spray  
- appreciate ENT input. absorbable floseal in the right nasal cavity was placed HLD- statin BPH- tamsulosin Depression- zoloft Appreciate Palliative care input.  12/20: \" Plan is to optimize patient current status here in the hospital on dobutamine for another couple of days; prior to discharge home OFF dobutamine, at which point he would enroll in Hospice at home. \" 
 
12/21: Explained in detail with patient and his son at bedside. Expressed concerns that dobutamine drip is not helping him. Patient and family understood. They want to enroll in hospice and take him tomorrow if possible. Family chose [facility] hospice and CM aware of this 12/22: discussed with son and family. Agreed for comfort measures. Discontinued dobutamine. Pain meds started. Hospice nurse will meet with family today Code status: DNR 
DVT prophylaxis: Xarelto Disposition: TBD. Possible home with hospice tomorrow  
---------------------------------------------- 
 
CC: hypotension S: Patient is seen and examined at bedside. Son present. Patient does not feel better today. He c/o sob this AM. Has been having nose bleeds since this am x 3 episodes. Discussed with ENT who helped with nasal packing. Review of Systems: A comprehensive review of systems was negative. O: 
Visit Vitals /72 (BP 1 Location: Right arm, BP Patient Position: At rest) Pulse (!) 103 Temp 97.8 °F (36.6 °C) Resp 16 Ht 5' 10\" (1.778 m) Wt 100.6 kg (221 lb 12.5 oz) SpO2 97% BMI 31.82 kg/m² PHYSICAL EXAM: 
Gen: moderate distress due to sob and nasal bleeding,elderly HEENT: anicteric sclerae, normal conjunctiva, oropharynx clear, MM moist 
Neck: supple, trachea midline, no adenopathy Heart: RRR, 3+ peripheral edema Lungs: decreased breath sounds at bases, rhonchi heard Abd: soft, NT, ND, BS+, no organomegaly Extr: warm Skin: dry, no rash Neuro: Mild confusion, normal speech, moves all extremities Psych: normal mood, appropriate affect Intake/Output Summary (Last 24 hours) at 12/22/2019 1111 Last data filed at 12/22/2019 1022 Gross per 24 hour Intake  Output 750 ml Net -750 ml  
  
 
 Recent labs & imaging reviewed: 
No results found for this or any previous visit (from the past 24 hour(s)). Recent Labs  
  12/21/19 
0445 12/20/19 
0300 WBC 8.6 3.6* HGB 13.2 11.2* HCT 38.8 32.7* PLT 89* 56* Recent Labs  
  12/21/19 
0445 12/20/19 
0300  140  
K 4.7 4.8  
 107 CO2 26 25 BUN 43* 36* CREA 2.21* 1.83* * 101* CA 9.4 8.8 PHOS  --  4.1 Recent Labs  
  12/20/19 
0300 ALB 3.4* No results for input(s): INR, PTP, APTT, INREXT, INREXT in the last 72 hours. No results for input(s): FE, TIBC, PSAT, FERR in the last 72 hours. Lab Results Component Value Date/Time Folate 10.1 11/25/2010 12:15 AM  
  
No results for input(s): PH, PCO2, PO2 in the last 72 hours. No results for input(s): CPK, CKNDX, TROIQ in the last 72 hours. No lab exists for component: CPKMB Lab Results Component Value Date/Time Cholesterol, total 114 11/25/2010 04:00 AM  
 HDL Cholesterol 53 11/25/2010 04:00 AM  
 LDL, calculated 50.2 11/25/2010 04:00 AM  
 Triglyceride 54 11/25/2010 04:00 AM  
 CHOL/HDL Ratio 2.2 11/25/2010 04:00 AM  
 
No results found for: Jamison Villagomez Lab Results Component Value Date/Time Color DARK YELLOW 12/19/2019 03:36 AM  
 Appearance CLEAR 12/19/2019 03:36 AM  
 Specific gravity 1.020 12/19/2019 03:36 AM  
 Specific gravity 1.020 01/02/2016 01:31 PM  
 pH (UA) 5.0 12/19/2019 03:36 AM  
 Protein 30 (A) 12/19/2019 03:36 AM  
 Glucose NEGATIVE  12/19/2019 03:36 AM  
 Ketone TRACE (A) 12/19/2019 03:36 AM  
 Bilirubin NEGATIVE  12/19/2019 03:36 AM  
 Urobilinogen 1.0 12/19/2019 03:36 AM  
 Nitrites NEGATIVE  12/19/2019 03:36 AM  
 Leukocyte Esterase TRACE (A) 12/19/2019 03:36 AM  
 Epithelial cells FEW 12/19/2019 03:36 AM  
 Bacteria NEGATIVE  12/19/2019 03:36 AM  
 WBC 5-10 12/19/2019 03:36 AM  
 RBC 0-5 12/19/2019 03:36 AM  
 
 
Med list reviewed Current Facility-Administered Medications Medication Dose Route Frequency  HYDROmorphone (PF) (DILAUDID) injection 0.5 mg  0.5 mg IntraVENous Q2H PRN  
 albuterol (PROVENTIL VENTOLIN) nebulizer solution 2.5 mg  2.5 mg Nebulization Q2H PRN  
 LORazepam (ATIVAN) injection 1 mg  1 mg IntraVENous Q2H PRN  
 balsam peru-castor oil (VENELEX) ointment   Topical Q8H  
 sodium chloride (NS) flush 5-40 mL  5-40 mL IntraVENous Q8H  
 sodium chloride (NS) flush 5-40 mL  5-40 mL IntraVENous PRN  
 diphenhydrAMINE (BENADRYL) injection 25 mg  25 mg IntraVENous Q6H PRN  
 HYDROcodone-acetaminophen (NORCO) 5-325 mg per tablet 1 Tab  1 Tab Oral Q4H PRN  
 sertraline (ZOLOFT) tablet 50 mg  50 mg Oral DAILY  simvastatin (ZOCOR) tablet 20 mg  20 mg Oral QHS  tamsulosin (FLOMAX) capsule 0.4 mg  0.4 mg Oral DAILY Care Plan discussed with:  Patient/Family and Nurse Tulio Hutchison MD 
Internal Medicine Date of Service: 12/22/2019

## 2019-12-22 NOTE — HOSPICE
[employer] [facility] Help to Those in Need 
[phone] Patient Name: Fabricio Hunt YOB: 1929 Age: 80 y.o. [employer] RN Note:  Verbal Hospice consult received from hospital [facility]. Currently reviewing chart. Will follow up with Unit Nurse and Care Manager to discuss plan of care, patient status and discharge disposition within the hour. Thank you for the opportunity to be of service to this patient. Jim Nevarez RN, [facility] Hospice Nurse Liaison [phone] Mobile [phone] Office 12:20: Pt having a nose bleed, MD bedside. Reviewed patient and medication orders with nurse. Paged Dr. Elma Lobato to request route change for Dilaudid and Lorazepam from IV to PO/SL in anticipation of pt going home. 14:00 Family meeting with two son's, Pati Ramos and Eugenio Montana . Discussed Hospice philosophy, general plan of care, levels of care, services and on call procedures. Family information packet provided and reviewed. Son's report that their mother was under hospice care, and was admitted and  on the 2nd floor of [facility]. Confirmed with hospice triage the ability to have an admission nurse meet with patient and family this evening. Plan at this time to contact Dr. Juventino Awan to discuss CTI. Plan to arrange home equipment for this evening. 14:30: Family has new concerns over having patient transported to home this evening. Plan to meet with family again at 15:00, after they have had time to discuss amongst themselves. 15:30: Family would like to admit pt to hospice care. Pt is meeting [facility] LOC under hospice for the symptoms of agitation and anxiety, and feeling short of breath. Dr. Juventino Awan has given verbal CTI for the diagnosis of End Stage Heart Failure. Dr. Veena Farfan will be attending under hospice care. Plan to alert Dr. Elma Lobato of this plan of care. 16:00: Hospital supervisor notified to flip chart Thank you for the opportunity to be of service to this patient.

## 2019-12-22 NOTE — DISCHARGE SUMMARY
Discharge Summary Patient:  Marianne Turner MRN: [id number] YOB: 1929 Age: 80 y.o. Date of admission:  12/18/2019 Date of discharge:  12/22/2019 Primary care provider: Dr. Anju Barragan MD 
 
Admitting provider:  Leonila Marquez MD 
 
Discharging provider:  Linnea Denney [address].: [phone]. If unavailable, call [phone] and ask the  to page the triage hospitalist. 
 
Consultations · IP CONSULT TO CARDIOLOGY · IP CONSULT TO NEPHROLOGY · IP CONSULT TO PALLIATIVE CARE - PROVIDER 
· IP CONSULT TO OTOLARYNGOLOGY Procedures · * No surgery found * Discharge destination: inpatient hospice. The patient is stable for discharge. Admission diagnosis · Hypotension [I95.9] Discharge Medication List as of 12/22/2019  4:09 PM  
  
 
 
Follow-up Information Follow up With Specialties Details Why Contact Info Anju Barragan MD Internal Medicine   [address] [id number] 
[phone] Final discharge diagnoses and brief hospital course Maycol Araya a 80 y. o. male with congestive heart failure reduced ejection fraction, HTN, atrial fibrillation presents to the ER after he had a fall at home. Patient states that he was walking to his house when he tripped however his son states patient became weak and let himself fall down to the floor 12/18/2019. Hypotension likely cardiogenic shock  - improving - Monitor I&Os - Keep MAP >65. 
- appreciate cardiology input  
- off dobutamine gtt today 
- s/p IV albumin x 3 Doses  
  
Acute on chronic systolic heart failure NYHA class 3 on poa - worsening -  noted to have 3+ edema on exam  
- probnp >10,000 on poa worsened >25,000 on12/21 
- CXR: Small bilateral effusions, right greater than left, without change. - Strict I&Os, Daily weight - Cardiology on board  
- echo recently done at cardiology office showed  stable LVEF 
- pt c/w sob, 1 dose of IV lasix 20mg given  
  
Acute kidney injury - worsening  
- cr 2.35 on poa, baseline 1.03 
-likely  pre renal azotemia from CRS, IV volume depletion and Hypotension  
- nephrology on board 
- renal us : No gross hydronephrosis or large shadowing calculus. - Monitor renal function. 
  
Elevated Troponin 
- in the setting of renal dysfunction. Could be precipitated by HF exac - No chest pain at this time. - troponin flat  
  
Afib - rate controlled. Radha Puckett started by cardiology 12/19. Discontinued Xarelto today for nose bleeds  
  
Epistaxis - 3 episodes since this am, increasing in duration, last episode lasted >20min - Hx epistaxis in the past s/p cauterization - INR 2.5  
- ENT consulted and discussed with Dr. Moreno 
- started on afrin spray  
- appreciate ENT input. absorbable floseal in the right nasal cavity was placed  
  
HLD- statin BPH- tamsulosin Depression- zoloft 
  
Appreciate Palliative care input. 12/20: \" Plan is to optimize patient current status here in the hospital on dobutamine for another couple of days; prior to discharge home OFF dobutamine, at which point he would enroll in Hospice at home. \" 
  
12/21: Explained in detail with patient and his son at bedside. Expressed concerns that dobutamine drip is not helping him. Patient and family understood. They want to enroll in hospice and take him tomorrow if possible. Family chose [facility] hospice and CM aware of this 
  
12/22: discussed with son and family. Agreed for comfort measures. Discontinued dobutamine. Pain meds started. Hospice nurse met with family. Decided to transfer the patient to inpatient hospice service. Discharge plan discussed with patient and family and they understood and agreed. Physical examination at discharge Visit Vitals /67 (BP 1 Location: Right arm, BP Patient Position: At rest) Pulse (!) 106 Temp 97.4 °F (36.3 °C) Resp 20 Ht 5' 10\" (1.778 m) Wt 100.6 kg (221 lb 12.5 oz) SpO2 100% BMI 31.82 kg/m² Gen: NAD,elderly HEENT: anicteric sclerae, normal conjunctiva, oropharynx clear, MM moist 
Neck: supple, trachea midline, no adenopathy Heart: RRR, 3+ peripheral edema Lungs: decreased breath sounds at bases, rhonchi heard Abd: soft, NT, ND, BS+, no organomegaly Extr: warm Skin: dry, no rash Neuro: Mild confusion, normal speech, moves all extremities Psych: normal mood, appropriate affect Pertinent imaging studies: 
 
None Recent Labs  
  12/21/19 
0445 12/20/19 
0300 WBC 8.6 3.6* HGB 13.2 11.2* HCT 38.8 32.7* PLT 89* 56* Recent Labs  
  12/21/19 
0445 12/20/19 
0300  140  
K 4.7 4.8  
 107 CO2 26 25 BUN 43* 36* CREA 2.21* 1.83* * 101* CA 9.4 8.8 PHOS  --  4.1 Recent Labs  
  12/20/19 
0300 ALB 3.4* Recent Labs  
  12/22/19 
1106 INR 2.5* PTP 24.4* No results for input(s): FE, TIBC, PSAT, FERR in the last 72 hours. No results for input(s): PH, PCO2, PO2 in the last 72 hours. No results for input(s): CPK, CKMB in the last 72 hours. No lab exists for component: TROPONINI No components found for: [place] Chronic Diagnoses:   
Problem List as of 12/22/2019 Date Reviewed: 10/30/2019 Codes Class Noted - Resolved Hypotension ICD-10-CM: I95.9 ICD-9-CM: 458.9  12/19/2019 - Present Hematuria ICD-10-CM: R31.9 ICD-9-CM: 599.70  12/30/2015 - Present Fecal impaction [facility]) ICD-10-CM: K56.41 
ICD-9-CM: 560.32  12/27/2015 - Present Hypertension ICD-10-CM: I10 
ICD-9-CM: 401.9  12/27/2015 - Present Urinary retention ICD-10-CM: R33.9 ICD-9-CM: 788.20  12/27/2015 - Present Rectal bleed ICD-10-CM: K62.5 ICD-9-CM: 569.3  7/19/2015 - Present Lower GI bleed ICD-10-CM: K92.2 ICD-9-CM: 578.9  7/18/2015 - Present Nausea & vomiting ICD-10-CM: R11.2 ICD-9-CM: 787.01  11/25/2010 - Present RESOLVED: Sepsis ([facility] [address] ICD-10-CM: A41.9 ICD-9-CM: 038.9, 995.91  10/28/2019 - 10/31/2019 Time spent on discharge related activities today greater than 30 minutes. Signed:  Jagdish Haynes MD 
               Hospitalist, Internal Medicine Cc: Juan Sandy MD

## 2019-12-22 NOTE — HOSPICE
[facility] to Those in Need 
[phone] 
  
Inpatient Nursing Admission Patient Name: Cayden Bolivar YOB: 1929 Age: 80 y.o. Date of Hospice Admission: 12/22/2019 Hospice Attending Elected by Patient: Hadley Aggarwal MD 
Primary Care Physician: Demetris Galindo MD 
Admitting RN: Aggie Huizar RN, [facility] : Not available            
  
Level of Care (GIP/Routine/Respite): GIP Facility of Care: [facility] Patient Room: 418/01 
  
 HOSPICE SUMMARY  
ER Visits/ Hospitalizations in past year: 3 Hospice Diagnosis: End stage heart failure Onset Date of Hospice Diagnosis: 6 months Summary of Disease Progression Leading to Hospice Diagnosis:  
  
Co-Morbidities:  
    
Patient Active Problem List  
Diagnosis Code  Nausea & vomiting R11.2  Lower GI bleed K92.2  Rectal bleed K62.5  Fecal impaction (HCC) K56.41  
 Hypertension I10  
 Urinary retention R33.9  Hematuria R31.9  Hypotension I95.9  
  
Diagnoses RELATED to the terminal prognosis:  
Acute MI, Chronic obstructive pulmonary disease, Hypertension, Pacemaker from 2013, Permanent A-Fib. The LCD for Hospice for the admitted diagnosis of heart disease includes: Both 1 & 2 should be met: 1. Treatment: 
[x] At the time of initial certification or recertification for hospice, the patient is or has been already optimally treated* for heart disease OR 
[] The patient is not a candidate for a surgical procedure OR  
[] The patient has declined a procedure. *Optimally treated means that patients who are not on vasodilators have a medical reason for refusing these drugs, e.g., hypotension or renal disease. 2.  NYHA Class: 
[x] The patient is classified as New York Heart Association (NYHA) Class IV* and may have significant symptoms of heart failure** or angina at rest.   
 
*Class IV patients with heart disease have an inability to carry on any physical activity without discomfort. Symptoms of heart failure or of the anginal syndrome may be present even at rest. If any physical activity is undertaken, discomfort is increased. **Significant congestive heart failure may be documented by an Ejection Fraction of ?20%, but is not required if not already available. 3. Documentation of the following factors will support but is not required to establish eligibility for hospice care: 
[x] Treatment resistant symptomatic supraventricular or ventricular arrhythmias 
[] History of cardiac arrest or resuscitation [] History of unexplained syncope 
[] Brain embolism of cardiac origin 
[] Concomitant HIV disease Rationale for a prognosis of life expectancy of 6 months or less if the disease follows its normal course (Disease Specific History):  
  
Figueroa Rodgers is a 80 y.o. who was admitted to [facility]. The patient's principle diagnosis of End Stage Heart Failure has resulted increased shortness of breath in the last few months, less energy and worsening LE edema. He has been following closely with his cardiologist Dr. Tim Malcolm who has been adjusting his diuretics. Pt has been on IV Dobutamine, discontinued the am of 12/22. . Functionally, the patient's Palliative Performance Scale has declined over a period of 6 months and is estimated at 30. Objective information that support this patients limited prognosis includes:congestive heart failure reduced ejection fraction, HTN, atrial fibrillation presents to the ER after he had a fall at home. The patient/family chose comfort measures with the support of Hospice. 
  
Patient meets for GIP LOC as evidenced by Respiratory distress, causing pt to feel short of breath and anxiety. Prognosis estimated based on 12/22/19 clinical assessment is:  
 
[x] Few to Many Days ASSESSMENT Patient self-reports:  [x]  Yes    [] No 
 
SYMPTOMS:  Respiratory distress, causing pt to feel short of breath and anxiety. SIGNS/PHYSICAL FINDINGS: Pt appears anxious with activity. Oxygen by NC added for comfort. Pt face reddened with activity, and pt heard moaning. Pt having nose bleeds, most likely from Xarelto that has been recently discontinued. No history of nose bleeds. From ENT 12/22/2019: 
Epistaxis 
  
History of Present Illness: Raven Valdes is a 80 y.o. male seen in consultation for epistaxis. Pt admitted for heart failure, was placed on xarelto and recently was made comfort care and is being set up for hospice care. Epistaxis began this morning, right sided. Per report he has had cauterization in the past, and during winter months will intermittently develop epistaxis that usually resolves on its own. Since it initially started it has decreased in volume considerably, and cardiology has stopped xarelto as pt is comfort care. Plts have been low. He did have a GLF which prompted his admission thought to be due to hypotension.   
Past Medical History:  has a past medical history of Acute MI ([address] Atrial fibrillation ([address] BPH (benign prostatic hyperplasia), Cancer ([address] Chronic obstructive pulmonary disease ([address] Heart failure ([address] Hypertension, and Pacemaker (06/18/2013). KARNOFSKY: 30 
 
FAST for all dementia:   
 
Learning Assessment: 
Patient Is patient willing/able to learn? YES Learning barriers (ESOL, [place], poor vision) Some confusion and forgetfulness Caregiver Is caregiver willing to learn care for patient? YES What is the highest level of education completed? Learning preference (written material, demonstration, visual)? Learning barriers No 
 
CLINICAL INFORMATION Wt Readings from Last 3 Encounters:  
12/22/19 100.6 kg (221 lb 12.5 oz) 11/01/19 96.6 kg (212 lb 14.4 oz) 05/09/19 95.6 kg (210 lb 12.2 oz) Ht Readings from Last 3 Encounters:  
12/18/19 5' 10\" (1.778 m)  
10/28/19 6' (1.829 m)  
05/09/19 6' 2\" (1.88 m) There is no height or weight on file to calculate BMI. There were no vitals taken for this visit. LAB VALUES No results found for this visit on 12/22/19 (from the past 12 hour(s)). No results found for this visit on 12/22/19 (from the past 6 hour(s)). Lab Results Component Value Date/Time Protein, total 7.2 12/18/2019 10:31 PM  
 Albumin 3.4 (L) 12/20/2019 03:00 AM  
 
 
Currently this patient has: 
[x] Supplemental O2 [] Peripheral IV  [] PICC    [] PORT [x] Reyes Catheter [] NG Tube   [] PEG Tube [] Ostomy   
[x] AICD: Has ICD been deactivated? [x] Yes [] No:______ PLAN 1. Admit to Hospice Care. Pt meets GIP LOC for inpatient hospice. 2, Education of patient, family and staff re end of life care, and disease progression. 2. Provide support and frequent rounds for patient comfort and safety 3. Continue with monitoring for symptoms for agitation, air hunger and restlessness; pt dobutamine IV was discontinued this am.  
4. Provide Spiritual and emotional support for patient and family bedside. 5. If patient is stable, family would like for pt to return to home, with son La Nena George. 6. Continue to monitor for nose bleeds. Hospice Team Frequency Orders: 
Skilled Nurse -   Daily x 7 days /every other day x 7 days  with 5 PRN visits for symptom control. [facility]  1 visit for initial assessment/evaluation for family support and need for volunteer services. [facility]  1 visit for initial assessment/evaluation for spiritual support. ADVANCE CARE PLANNING (Complete in ACP Flow Sheet) Code Status: DNR Durable DNR: [x]  Yes Code Status Discussed/Confirmed: YES Preference for Other Life Sustaining Treatment Discussed/Confirmed: YES Hospitalization Preference:  Patient would like to receive end of life care in the hospital, and at home if possible Advance Care Planning 12/19/2019 Patient's Healthcare Decision Maker is: - Confirm Advance Directive Yes, on file Does the patient have other document types Do Not Resuscitate; Power of [employer]  Service: [] Yes  [x]  No      [] Unknown Appropriate for Pinning Ceremony:  [] Yes     [] No 
Congregational: Sikh Recently converted to Scientologist  Home: [address] [id number] arrangements have been made by pt in the past 
 
DISCHARGE PLANNING 1. Discharge Plan: To return home with Nini Flood and his family 2. Patient/Family teaching: patient and family 3. Response to patient/family teaching: Patient and Family agree SOCIAL/EMOTIONAL/SPIRITUAL NEEDS Spiritual Issues Identified: None noted: Pt has been Mormon; recently converted to Scientologist Psych/ Social/ Emotional Issues Identified: Family appear to be very supportive of patient and each other. Caregiver Support: 
[] Provided information on End of Life Care  
[] Material Provided: Gone From My Sight or Journey's End  
 
CARE COORDINATION Dr. Magnus Steen contacted, discharge to hospice order received Dr. Edith Soto contacted, agrees to serve as medical directory, provided verbal certification of terminal illness with life expectancy of 6 months or less. Dr. Jhon Cook to be attending provider for hospice and Orders for hospice admission, medications and plan of treatment received. Medication reconciliation completed. MEDS: See medication list below DME: Per hospital 
Supplies: Per hospital 
IDT communication to include MD, SN, SW, CH and support team 
 
ALLERGIES AND MEDICATIONS Allergies: Allergies Allergen Reactions  Prednisone Other (comments) Bloats him No current facility-administered medications for this encounter. Dominick Ruiz RN, [facility] Hospice Nurse Liaison [phone] Mobile [phone] Office

## 2019-12-22 NOTE — PROGRESS NOTES
Problem: Falls - Risk of 
Goal: *Absence of Falls Description Document Refugio Arreola Fall Risk and appropriate interventions in the flowsheet. Outcome: Progressing Towards Goal 
Note:  
Fall Risk Interventions: 
  
Bed in low position. Frequent bedside check. Problem: Patient Education: Go to Patient Education Activity Goal: Patient/Family Education Outcome: Progressing Towards Goal 
  
Problem: Pressure Injury - Risk of 
Goal: *Prevention of pressure injury Description Document Kadeem Scale and appropriate interventions in the flowsheet. Outcome: Progressing Towards Goal 
Note: Pressure Injury Interventions: 
Sensory Interventions: Check visual cues for pain, Monitor skin under medical devices, Pressure redistribution bed/mattress (bed type), Assess need for specialty bed, Minimize linen layers Moisture Interventions: Check for incontinence Q2 hours and as needed, Minimize layers, Apply protective barrier, creams and emollients Friction and Shear Interventions: Apply protective barrier, creams and emollients, Foam dressings/transparent film/skin sealants, Lift sheet

## 2019-12-22 NOTE — CONSULTS
Otolaryngology-Head and Neck Surgery Consult Patient: Khris Fuller : 1929 MRN: [id number] Date of Service: 19 Consult requested by: Destiny Duran MD 
 
Reason for Consultation:  epistaxis History of Present Illness: Khris Fuller is a 80 y.o. male seen in consultation for epistaxis. Pt admitted for heart failure, was placed on xarelto and recently was made comfort care and is being set up for hospice care. Epistaxis began this morning, right sided. Per report he has had cauterization in the past, and during winter months will intermittently develop epistaxis that usually resolves on its own. Since it initially started it has decreased in volume considerably, and cardiology has stopped xarelto as pt is comfort care. Plts have been low. He did have a GLF which prompted his admission thought to be due to hypotension. Past Medical History:  has a past medical history of Acute MI ([facility] [address] Atrial fibrillation ([facility] [address] BPH (benign prostatic hyperplasia), Cancer ([facility] [address] Chronic obstructive pulmonary disease ([facility] [address] Heart failure ([facility] [address] Hypertension, and Pacemaker (2013). Past Surgical History:  has a past surgical history that includes pr cardiac surg procedure unlist; hx orthopaedic; and hx pacemaker. Medications:  
 
Current Facility-Administered Medications:  
  HYDROmorphone (PF) (DILAUDID) injection 0.5 mg, 0.5 mg, IntraVENous, Q2H PRN, Madala, Sushma, MD 
  albuterol (PROVENTIL VENTOLIN) nebulizer solution 2.5 mg, 2.5 mg, Nebulization, Q2H PRN, Madala, Sushma, MD 
  LORazepam (ATIVAN) injection 1 mg, 1 mg, IntraVENous, Q2H PRN, Madala, Sushma, MD 
  balsam peru-castor oil (VENELEX) ointment, , Topical, Q8H, Madala, Sushma, MD 
  sodium chloride (NS) flush 5-40 mL, 5-40 mL, IntraVENous, Q8H, Saurav Pierre MD, 10 mL at 19 2222   sodium chloride (NS) flush 5-40 mL, 5-40 mL, IntraVENous, PRN, Saurav Pierre MD 
   diphenhydrAMINE (BENADRYL) injection 25 mg, 25 mg, IntraVENous, Q6H PRN, Madala, Sushma, MD, 25 mg at 19 0057 
  HYDROcodone-acetaminophen (NORCO) 5-325 mg per tablet 1 Tab, 1 Tab, Oral, Q4H PRN, Thelma Hanna MD, 1 Tab at 19 5768   sertraline (ZOLOFT) tablet 50 mg, 50 mg, Oral, DAILY, Madala, Sushma, MD, 50 mg at 19 7527   simvastatin (ZOCOR) tablet 20 mg, 20 mg, Oral, QHS, Thelma Hanna MD, 20 mg at 19 2222   tamsulosin (FLOMAX) capsule 0.4 mg, 0.4 mg, Oral, DAILY, Madala, Sushma, MD, 0.4 mg at 19 9033 Allergies: Allergies Allergen Reactions  Prednisone Other (comments) Bloats him Social History:  
Social History Tobacco Use  Smoking status: Former Smoker Packs/day: 0.50 Years: 40.00 Pack years: 20.00 Last attempt to quit: 1984 Years since quittin.0  Smokeless tobacco: Never Used Substance Use Topics  Alcohol use: Yes Alcohol/week: 5.8 standard drinks Types: 7 Glasses of wine per week Family History: History reviewed. No pertinent family history. Review of Systems:  
Consitutional: denies fever, excessive weight gain or loss. Eyes: denies diplopia, eye pain. Integumentary: denies new concerning skin lesions. Ears, Nose, Mouth, Throat: denies except as per HPI. Endocrine: denies hot or cold intolerance, increased thirst. 
Respiratory: denies cough, hemoptysis, wheezing Gastrointestinal: denies trouble swallowing, nausea, emesis, regurgitation Musculoskeletal: denies muscle weakness or wasting Cardiovascular: denies chest pain, shortness of breath Neurologic: denies seizures, numbness or tingling, syncope Hematologic: denies easy bleeding or bruising Physical Examination:  
 
Vital Signs:  
Visit Vitals BP 97/76 (BP 1 Location: Right arm, BP Patient Position: At rest) Pulse 96 Temp 98.3 °F (36.8 °C) Resp 22 Ht 5' 10\" (1.778 m) Wt 100.6 kg (221 lb 12.5 oz) SpO2 96% BMI 31.82 kg/m² Constitutional: The patient is in no acute distress. Psychiatric: The patient is alert. Eyes: Extraocular movements are intact. Ears: Auricles normal in shape and position. Nose: Dorsum is midline with bandage over dorsum from recent fall. There is evidence of recent bleeding in the right nasal cavity with dried blood, and old clot in the posterior pharynx. Minimal active bleeding Oral Cavity and Oropharynx/Gastrointestinal:   No lesions of the tongue, mucosa, roof or floor of mouth. Tongue is midline and mobile. Palate elevates symmetrically. Neck/Lymphatic:  Supple without lymphadenopathy and thyromegaly. Musculoskeletal: There is good range of motion of the neck. Neurological:CNs grossly intact Skin: There are no concerning head and neck cutaneous lesions noted. Respiratory: There is normal work of breathing without stridor or stertor. No signs of airway compromise. Laboratory Values: I reviewed the lab work listed below No results found for this visit on 12/18/19 (from the past 12 hour(s)). H/H 13.2/38.8, plts 89 12/21 Imaging: None applicable to this issue Procedure: After discussing procedure with pt and family and obtaining verbal informed consent, floseal was packed into the right nasal cavity providing tamponade and hemostasis. Small amount of afrin mixed in with floseal. Pt tolerated well and hemostasis achieved. Had pt swish/swallow multiple rounds of water and saw no further bleeding in the posterior pharynx or anteriorly out of the nose. Assessment: 1. Epistaxis, s/p control at bedside Plan: As pt is comfort measures both he and family wished for least invasive means of controlling epistaxis. Upon inspection had very minimal residual active bleed, therefore elected to place absorbable floseal in the right nasal cavity with good control.   
 
Recommend start using saline nasal spray in 24 hours, TID and prn to assist the foam in dissolving-- foam is dissolvable and will not need to be removed Discussed no bending over/straining, cough/sneeze with mouth open, NO nose blowing, and avoid hot showers/foods/soups etc. 
 
Expect some mild breakthrough bleeding and this will likely respond to afrin and pressure over cartilagenous portion of the nose for 15-20 minutes. Again, pt and family did not wish for more significant packing especially given that he is comfort care. Control coagulopathies as able, coming off xarelto should help as well Will be available as needed please call w/ any questions/concerns. Signed, Lazara Guallpa MD 
[facility] [phone]

## 2019-12-22 NOTE — PROGRESS NOTES
Cardiology Progress Note                                        Admit Date: 12/18/2019 Assessment/Plan: 1.  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 LVEF 35%  
 Started dobutamine, will increase to 5  While here  however hr increase to 140s 
rec dc dobutamine as hospice Net out 1 l 2. Permanent afib 3. ICD :   
4.  Hospice:  ICD   turned off ( I contacted [employer]) 5. Pre renal azotemia:  bumex iv 6. Reyes for urinary retention. 7.  Nose bleeds   : agree with dc xarelto with Hospice Will sign off as comfort measures Suzanna Payment is a 80 y.o. male with PROBLEM LIST: 
Patient Active Problem List  
 Diagnosis Date Noted  Hypotension 12/19/2019  Hematuria 12/30/2015  Fecal impaction ([address] 12/27/2015  Hypertension 12/27/2015  Urinary retention 12/27/2015  Rectal bleed 07/19/2015  Lower GI bleed 07/18/2015  Nausea & vomiting 11/25/2010 Subjective:  
 
Suzanna Payment admits to fatigue. Visit Vitals /72 (BP 1 Location: Right arm, BP Patient Position: At rest) Pulse (!) 103 Temp 97.8 °F (36.6 °C) Resp 16 Ht 5' 10\" (1.778 m) Wt 221 lb 12.5 oz (100.6 kg) SpO2 97% BMI 31.82 kg/m² Intake/Output Summary (Last 24 hours) at 12/22/2019 9516 Last data filed at 12/21/2019 1702 Gross per 24 hour Intake  Output 250 ml Net -250 ml Objective:  
  
Physical Exam: 
HEENT: Perr[place], EOMI Neck: No JVD,  No thyroidmegaly Resp: CTA bilaterally; No wheezes or rales CV: RRR s1s2 No murmur no s3 Abd:Soft, Nontender Ext: No edema Neuro: Alert and oriented; Nonfocal 
Skin: Warm, Dry, Intact Pulses: 2+ DP/PT/Rad Telemetry: AFIB Current Facility-Administered Medications Medication Dose Route Frequency  furosemide (LASIX) injection 20 mg  20 mg IntraVENous ONCE  
 HYDROmorphone (PF) (DILAUDID) injection 0.5 mg  0.5 mg IntraVENous Q2H PRN  
 DOBUTamine (DOBUTREX) 500 mg/250 mL (2,000 mcg/mL) infusion  2.5 mcg/kg/min IntraVENous CONTINUOUS  
 balsam peru-castor oil (VENELEX) ointment   Topical Q8H  
 sodium chloride (NS) flush 5-40 mL  5-40 mL IntraVENous Q8H  
 sodium chloride (NS) flush 5-40 mL  5-40 mL IntraVENous PRN  
 diphenhydrAMINE (BENADRYL) injection 25 mg  25 mg IntraVENous Q6H PRN  
 HYDROcodone-acetaminophen (NORCO) 5-325 mg per tablet 1 Tab  1 Tab Oral Q4H PRN  
 sertraline (ZOLOFT) tablet 50 mg  50 mg Oral DAILY  simvastatin (ZOCOR) tablet 20 mg  20 mg Oral QHS  tamsulosin (FLOMAX) capsule 0.4 mg  0.4 mg Oral DAILY Data Review:  
Labs:   
No results found for this or any previous visit (from the past 24 hour(s)).

## 2019-12-22 NOTE — PROGRESS NOTES
Problem: Anxiety/Agitation Goal: Verbalize and demonstrate ability to manage anxiety Description The patient/family/caregiver will verbalize and demonstrate ability to manage the patient's anxiety throughout hospice care. Outcome: Progressing Towards Goal 
SL Ativan on board for anxiety. Assessed pt for anxiety as needed. Problem: Risk for Falls Goal: Free of falls during episode of care Description Patient will be free of falls during episode of care. Outcome: Progressing Towards Goal 
 Mental status prevents appropriate knowledge of fall prevention. Bed alarm in place, turned on, and audible from the nurses station. Frequent purposeful rounding initiated by staff. Problem: Pain Goal: Verbalize satisfaction of level of comfort and symptom control Outcome: Progressing Towards Goal 
SL Dilaudid on board. Pt comfortable and free of pain 
  
Problem: Spiritual Distress Goal: Distress heard, acknowledged, and addressed Description Patient/family/caregiver distress will be heard, acknowledged, and addressed throughout hospice care. Outcome: Progressing Towards Goal 
 at bedside , family at bedside for support 
 
0650 TRANSFER - OUT REPORT: 
 
Verbal report given to [facility] (name) on [facility] Yudyny  being transferred to  [address] (unit) for change in patient condition(Hospice) Report consisted of patients Situation, Background, Assessment and  
Recommendations(SBAR). Information from the following report(s) Quality Measures was reviewed with the receiving nurse. Opportunity for questions and clarification was provided. Patient transported with: 
 Patient-specific medications from Pharmacy Registered Nurse

## 2019-12-22 NOTE — PROGRESS NOTES
Problem: Heart Failure: Day 4 Goal: *Oxygen saturation within defined limits Outcome: Progressing Towards Goal 
Goal: *Optimal pain control at patient's stated goal 
Outcome: Progressing Towards Goal 
 
Hourly rounding done, pt in A fib, on RA, O2 saturation greater than 93%, has morales, had a minor nose bleed, pressure held to nose, bleeding stopped, complained of pain, hydrocodone given. 0800-Bedside shift change report given to Byron Abbott (oncoming nurse) by Vasile Connelly RN (offgoing nurse). Report included the following information SBAR, Kardex, ED Summary, Intake/Output, MAR, Accordion, Recent Results, Med Rec Status and Cardiac Rhythm a fib.

## 2019-12-23 NOTE — PROGRESS NOTES
Occupational Therapy Screening: 
Services are not indicated at this time. [facility] based on results obtained during the nursing admission assessment. The patients chart was reviewed and the patient is not appropriate for a skilled therapy evaluation at this time. Please consult occupational therapy if any therapy needs arise. Thank you.  
 
Bry Myles OT

## 2019-12-23 NOTE — PROGRESS NOTES
Music Therapy Assessment Dilan [address] [phone]    
1/1/1929  80 y.o.  male Patient Telephone Number: [phone] (home) Synagogue Affiliation: Amish [place] Language: [place] Patient Active Problem List  
 Diagnosis Date Noted  Heart failure with reduced ejection fraction, NYHA class III ([facility] 12/22/2019  Hypotension 12/19/2019  Hematuria 12/30/2015  Fecal impaction ([facility] 12/27/2015  Hypertension 12/27/2015  Urinary retention 12/27/2015  Rectal bleed 07/19/2015  Lower GI bleed 07/18/2015  Nausea & vomiting 11/25/2010 Date: 12/23/2019                       [facility] 6E MED ONCOLOGY Music therapy consult received to support comfort at end of life. The music therapist will offer music therapy to the patient upon next returning to this hospital on 12/26/19. Thank you for including music therapy in this patient's care. MICKY Handy (Music Therapist-Board Certified) Spiritual Care Department Referral-based service

## 2019-12-23 NOTE — PROGRESS NOTES
visit to support family. Pt's son and daughter-in-law at bedside. Lester Horner shared about his father and his mother who passed 10 years ago.  provided pastoral listening, support and prayer.  follow up as needed. Magda Patel, [facility] 
 [phone])

## 2019-12-23 NOTE — HOSPICE
This was an initial assessment to offer support and assess need. Upon arrival Mr. Beau Larkin appeared to be sleeping and did not respond to conversation occurring around him. His son Daren Wall and a man who said he was a nephew were in the room. According to them, they were reflecting on his life and their life with him. Introduced myself and my role. Daren Wall shared some of his family's spiritual journey. Daren Wall is excited and at peace with his journey and his fathers journey. Maximilian's brother Uyen Sparks is Rameshchuck Kang. All are accepting, honoring, and respectful of choices that have been made according to Daren Wall. Ailyn Hook that I will continue him and family in my prayers as he expressed appreciation for prayer support. He noted his prayers for his father. His expressed wish, which he says Cadedemetrisrosita Bridger share, is for peace for their father.

## 2019-12-23 NOTE — HOSPICE
[facility] Good Help to Those in Need 
[phone] Social Work Admission Note Patient Name: Donato Harris YOB: 1929 Age: 80 y.o. Date of Visit: 12/23/19 Facility of Care: [facility] Patient Room: [facility]/ Hospice Attending: Gerard Luis MD 
Hospice Diagnosis: Heart failure with reduced ejection fraction, NYHA class III ([address] [I50.20] Level of Care:  
 [x]  GIP []  Respite 
 []  Routine NARRATIVE This LCSW visited the room pt who slept much of my visit and awoke confused and very lethargic. Son Angella Polanco (  co- MPOA, with brother Elisha Drivers) and Sloanecharla ZAVALA were at bedside. Pt is a 81 y/o CM with a hospice diagnosis of ESHF. Pt has comorbid Chronic Systolic CHF, hx of CABG, A-fib, hx of falls and pacemaker. Pt was admitted 12/19/2019 due to a ground level fall from home. Pt lives with his son Flaco Trammell and family. Pt has been  for 10 yrs. The couple has 2 children Flaco Trammell and Elisha Drivers, both live locally. Pt is a retired /  for [employer] which later became [employer]. The family describes him as a wonderful, , father, and provider. Pt has been retired since he was 58. Son reports his parents traveled and enjoyed a perfect nursing home. Family reports pt had good QOL pt golfing and driving, until a few weeks ago. LCSW listened and son talked about pts worsening health issues over the past months and weeks. Son was tearful during our visit. Son appears realistic and accepting. Son primary goal is for his father to be comfortable. LCSW provided emotional support for son in coping with pts pending death due to [facility] LCSW provided supportive listening and affirmation as son talked about pts conversion to the [address]. Son reports his parents were raised [place]. Son stated he was able to lead his mother to Shamar at the bedside prior to her passing at [facility] while under hospice care.  Son talked about the peace he feels now that pt has also accepted Shamar at the bedside. Son talked about how meaningful this has been to him. LCSW affirmed the importance of mehul for son. LCSW and son talked about the EOL process. LCSW normalized any secretions pt may experience. LCSW provided education re the signs and symptoms of EOL. LCSW shared GFMS. ADVANCE CARE PLANNING Code Status: DDNR Durable DNR: X_ Yes  _ No 
Advance Care Planning 2019 Patient's Healthcare Decision Maker is: Named in scanned ACP document Confirm Advance Directive Yes, on file Does the patient have other document types Do Not Resuscitate Relationship Status: 
[]  Single    
[]       
[]     
[]  Domestic Partner    
[x]  / 
[]  Common Law 
[]   
[]  Unknown If in a relationship, name of partner/spouse: 
Duration of relationship: 
 
Sabianism: Restorationism  Home: [facility] Resources Provided: GFMS Social Work Initial Assessment Gender: 
male Race/Ethnicity: (sylvia all that apply) []  American [place] or [place] Native 
[]   
[]  Black or [place] American 
[]   or  
[]   or [place] 
[x]  Jason Deng 
[]  Unknown 
  
 Service:   
[]  Yes  
[x]  No      
[]  Unknown Appropriate for Pinning Ceremony:  
[]  Yes     
[]  No 
Is patient using VA benefits? []  Yes     
[]  No 
  
Primary Language: English 
[]   Needed 
[]   utilized during visit Ability to express thoughts/needs/feelings 
[]  Expressed thoughts/feelings/needs without difficulty [x]  Requires extra time and cuing 
[]  Speech limited single words 
[]  Uses only gestures (eye, blinking eye or head movement/pointing) []  Unable to express thoughts/feelings/needs (speech unintelligible or inappropriate) []  Unresponsive Notes:  
  
Mental Status: 
[]  Alert-oriented to:   
 []  Person   
 []  Place   
 []  Time []  Comatose-responds to:  
 []   Verbal stimuli  
 []  Tactile stimuli  
 []  Painful stimuli 
[]  Forgetful 
[x]  Disoriented/Confused 
[x]  Lethargic 
[]  Agitated 
[]  Other (specify):   
Notes:  
  
Patients description of Illness/Current Health Status:   
[x]  Patient unable to discuss, confused 
[]  Patient unwilling to discuss 
[]  (Specify) Knowledge/Understanding of Disease Process Patient:  
 []  Demonstrates knowledge/understanding of disease process 
 []  Demonstrates knowledge/understanding of treatment plan 
 []  Demonstrates knowledge/understanding of prognosis []  Demonstrates acceptance of prognosis []  Demonstrates knowledge/understanding of resuscitation status [x]  Other (specify) confused Caregiver: 
 [x]  Demonstrates knowledge/understanding of disease process [x]  Demonstrates knowledge/understanding of treatment plan 
 [x]  Demonstrates knowledge/understanding of prognosis [x]  Demonstrates acceptance of prognosis [x]  Demonstrates knowledge/understanding of resuscitation status 
 []  Other (specify) Notes:  
  
Patients living arrangement/care setting: 
Use the PRIOR COLUMN when the PATIENTS current health status necessitated a change in his/her primary residence. Prior Current Response  
           []             []    Patients own home/residence [x]             []    Home of family member/friend []             []    Boarding home  
           []             []    Assisted living facility/FDC center []             []    Hospital/Acute care facility []             []    Skilled nursing facility []             []    Long term care facility/Nursing home  
           []             [x]    Hospice in Patient Primary Caregiver: 
[]  No Primary Caregiver Name of Primary Caregiver: Troy Herrera Relationship or Primary Caregiver:  
 []  Spouse/Significant other     
 [x]  Natural Child      
 []  Step child     
 []  Sibling 
 []  Parent 
 []  Friend/Neighbor 
 []  Community/Adventism Volunteer 
 []  Paid help 
 []  Other (specify):___________ Notes:   
  
Family members/Significant others: 
Leticia Maddy Relationship: son Phone [phone] Actively involved in care? [x]  Yes  []  No 
 
Name: Natividad Bain Relationship: son Phone Number: Actively involved in care? [x]  Yes  []  No 
 
Name: 
Relationship: 
Phone Number: Actively involved in care? []  Yes  []  No 
 
Social support systems: (select ONE best description) [x]  Excellent social support system which includes three or more family members or friends 
[]  Good social support system which includes two or less members or friends 
[]  [address] support which includes one family member or friend 
[]  Poor social support; no family members or friends; basically ALONE Notes:  
  
Emotional Status: (sylvia all that apply) Patient Caregiver Response [x]                [x]    Mood/Affect stable and appropriate    
              []                []    Angry  
              []                []    Anxious []                []    Apprehensive []                []    Avoidant  
              []                []    Clinging  
              []                []    Depressed  
              []                []    Distraught  
              []                []    Elated []                []    Euphoric  
              []                []    Fearful  
              []                []    Flat Affect  
              []                []    Helpless []                []    Hostile []                []    Impulsive []                []    Irritable  
              []                []    Labile  
              []                []    Manic  
              []                []    Restlessness []                []    Sad  
              []                []    Suspicious []                [x]    Tearful  
              []                []    Withdrawn Notes:  
 
Coping Skills (strengths/weakness):  
 Patient: Coping Skills (strength/weakness):  Confused, lethargic/very supportive family. Family/caregiver (strength/weakness): Well supported, strong mehul/was pts pcg, pt lived with Charlene Ackerman and his family. 
  
[place] of care (sylvia all that apply):    
[x]  No burden evident  
[]  Family must administer medications  
[]  Illness causing financial strain  
[]  Family/Support feels overwhelmed  
[]  Family/Support sleep disturbed with patients care  
[]  Patients care causes extra physical stress  of death 
[]  Illness causes changes in family lifestyle 
[]  Illness impacting family/support employment 
[]  Family experiencing increased time demands 
[]  Patients behavior endangers family 
[]  Denial of patients illness 
[]  Concern over outcome of illness/fear 
[]  Patients behavior embarrassing to family Notes:  
  
Risk Factors: (sylvia all that apply):   
[x]  No burden evident  
[]  Alcohol abuse 
[]  Financial resources inadequate to meet basic needs (food/house/etc) []  Financial resources inadequate to meet health care needs (supplies/equipment/medications) 
[]  Food/nutrition resources inadequate 
[]  Home environment unsafe/inadequate for home care 
[]  Homicidal risk 
[]  Lives alone or without concerned relatives 
[]  Multiple medications/complex schedule 
[]  Physical limitations increase likelihood of falls 
[]  Plan of care/treatments complicated 
[]  Substance use/abuse 
[]  Suicidal risk 
[]  Visual impairment threatens safety/ability to perform self-care 
[]  Other (specify): 
  
Abuse/Neglect (actual/potential risks): 
[x]  No signs of abuse/neglect 
[]  History of abuse/neglect                 []  [id number]          []  Sexual 
 []  History of domestic violence 
[]  Lacks adequate physical care 
[]  Lacks emotional nurturing/support 
[]  Lacks appropriate stimulation/cognitive experiences 
[]  Left alone inappropriately 
[]  Lacks necessary supervision 
[]  Inadequate or delayed medical care 
[]  Unsafe environment (i.e guns/drug use/history of violence in the home/etc.) []  Bruising or other physical signs of injury present 
[]  Other (specify): 
Notes:  
[]  Refer to child/adult protective services Current Sources of Stress (in Addition to Current Illness):  
[x]  None reported 
[]  Bills/Debt   
[]  Career/Job change   
[]   (short term)   
[]   (long term)   
[]  Death of a child (recent)   
[]  Death of a parent (recent)  
[]  Death of a spouse (recent)  
[]  Employment status changed  
[]  Family discord   
[]  Financial loss/Inadequate inther (specify):come 
[]  Job loss 
[]  Legal issues unresolved 
[]  Lifestyle change 
[]  Marital discord 
[]  Marriage within the last year 
[]  Paperwork (insurance/legal/etc) overwhelming 
[]  Separation/Divorce 
[]  Other (specify): 
Notes:  
  
Current Community Resources Being Utilized 1. None noted Interventions/Plan of Care 1. Assess social and emotional factors related to coping with end of life issues 2. Community resource planning/referral  
3. Relocation to different care setting if/when symptoms stabilize 4. Discharge Planning 1. If stable pt will return to [facility] home. MSW Assessment Completed by: Bob Chacon 12/23/19 Time In: 15:00 Time Out:16:30

## 2019-12-23 NOTE — HOSPICE
[facility] Good Help to Those in Need 
[phone] GIP Daily Nursing Note Patient Name: Allen Jaramillo YOB: 1929 Age: 80 y.o. Date of Visit: 12/23/19 Facility of Care: [facility] Patient Room: [facility] Attending: Yamilka Mcdonnell MD 
Hospice Diagnosis: Heart failure with reduced ejection fraction, NYHA class III ([address] [I50.20] Level of Care: GIP Current GIP Symptoms 1. Anxiety and restlessness 2. Pain 3. Respiratory Distress ASSESSMENT & PLAN 1. Education of patient, family and staff re end of life care 2. Provide support and frequent rounds for patient comfort and safety 3. Continue with IV PRN dosing of lorazepam for anxiety and breathlessness 4. Continue to provide Dilaudid PRN. Route change from SL to IV. Dose amount adjustment made as appropriate for change in route. 5. Change dose of IV Ketorolac to use for PRN fever 6. Hospice Chaplain to offer support today 7. Hospice Social Worker to offer support today 8. Continue with wound care 9. Continue with morales care for infection prevention Spiritual Interventions: Hospice Chaplain made bedside visit with patient and son Adelso Ledbetter this am. 
 
Psych/ Social/ Emotional Interventions: Bedside support offered to pt and Adelso Ledbetter. Dr. Kayla Jackman bedside today providing emotional support and education. Care Coordination Needs: Jayson Villa LCSW to meet with patient and family today. Care plan and New Orders discussed / approved with Kayla Jackman MD. Description History and Chart Review If this is initial GIP note must document RN assessment/MD communication in previous setting. Specifically document nursing/medication needs in last 24 hours to support GIP care Narrative History of last 24 hours that demonstrates care cannot be provided in another setting: 
IV Doses of medications for symptom management. Frequent bedside family support and education for end of life and symptom management. What has been done to control the patient's symptoms in the last 24 hours? SL Doses of Dilaudid, IV dosing of lorazepam and ketorolac. Oxygen use PRN. Reposition for comfort. Does the patient currently require IV medications? Yes Does the patient currently require scheduled medications? No 
Does the patient currently require a PCA? No 
 
List number of doses of PRN medications in last 24 hours: 
Medication 1: Dilaudid IV Number of doses: 2 Medication 2: Lorazepam SL 
Number of doses: 3 Medication 3: Ketorolac Number of doses: 2 Supporting documentation for GIP need for pain control: 
[x] Frequent evaluation by a doctor, nurse practitioner, nurse  
[x] Frequent medication adjustment   
[x] IVs that cannot be administered at home  
[] Aggressive pain management  
[] Complicated technical delivery of medications Supporting documentation for GIP need for symptom control: 
[x]  Sudden decline necessitating intensive nursing intervention 
[]  Uncontrolled / intractable nausea or vomiting  
[]  Pathological fractures 
[]  Advanced open wounds requiring frequent skilled care [x] Unmanageable respiratory distress [x] New or worsening delirium  
[] Delirium with behavior issues: Is 24 hour caregiver present due to safety concerns with agitation? (yes/no) [] Imminent death  with skilled nursing needs documented above DISCHARGE PLANNING Daily discharge planning required for GIP 1. Discharge Plan: Pt to return home to live with Marisela Russell 2. Patient/Family teaching: Marisela Russell bedside 3. Response to patient/family teaching: Accepting ASSESSMENT   
KARNOFSKY: 20 (decline since yesterday) Prognosis estimated based on 12/23/19 clinical assessment is:  
 
[x] Hours to Days Quality Measure: Patient self-reports:  [x] Yes    [] No 
Unable to communicate all of the ESAS questions: 
ESAS:  
Time of Assessment: 11:00 Pain (1-10):2 Fatigue (1-10): Shortness of breath (1-10):5 
LAST BM: 12/21 Adult Non-Verbal Pain Assessment Score: 7/10 Face 
[] 0   No particular expression or smile 
[] 1   Occasional grimace, tearing, frowning, wrinkled forehead 
[x] 2   Frequent grimace, tearing, frowning, wrinkled forehead Activity (movement) [] 0   Lying quietly, normal position 
[x] 1   Seeking attention through movement or slow, cautious movement 
[] 2   Restless, excessive activity and/or withdrawal reflexes Guarding 
[] 0   Lying quietly, no positioning of hands over areas of body [x] 1   Splinting areas of the body, tense 
[] 2   Rigid, stiff Physiology (vital signs) 
[] 0   Stable vital signs [x] 1   Change in any of the following: SBP > 20mm Hg; HR > 20/minute 
[] 2   Change in any of the following: SBP > 30mm Hg; HR > 25/minute Respiratory 
[] 0   Baseline RR/SpO2, compliant with ventilator 
[] 1   RR > 10 above baseline, or 5% drop SpO2, mild asynchrony with ventilator 
[x] 2   RR > 20 above baseline, or 10% drop SpO2, asynchrony with ventilator CLINICAL INFORMATION Patient Vitals for the past 12 hrs: 
 Pulse Resp  
12/23/19 [id number] Currently this patient has: 
[] Supplemental O2 [x] IV   
[] PICC [] PORT  
[] NG Tube   
[] PEG Tube  
[] Ostomy    
[x] Reyes draining blood sedimented dark urine 
[] Other:  
 
SIGNS/PHYSICAL FINDINGS Skin (including wound): 
[] Warm, dry, supple, intact and color normal for race [x] Warm  
[x] Dry  
[] Cool    
[] Clammy      
[] Diaphoretic Turgor 
 [] Normal 
 [] Decreased Color: 
 [x] Pink 
 [] Pale 
 [] Cyanotic 
 [] Erythema 
 [] Jaundice [] Normal for Race 
[x]  Wounds:Legs and sacrum Neuro: 
[] Lethargy 
[x] Restlessness / agitation 
[] Confusion / delirium 
[] Hallucinations 
[] Responds to maximal stimulation 
[] Unresponsive 
[] Seizures Cardiac: 
[x] Dyspnea on Exertion 
[] JVD [] Murmur 
[] Palpitations [] Hypotension 
[] Hypertension 
[] Tachycardia [] Bradycardia [x] Irregular HR A Fib [] Pulses Decreased 
[] Pulses Absent [x] Edema:       extremeties [] Mottling:      (Location) Respiratory: 
Breath sounds:  
 [x] Diminished 
 [] Wheeze [x] Rhonchi 
 [] Rales  
[] Even and unlabored 
[x] Labored: with activity and anxiety 
[] Cough 
 [] Non Productive 
 [] Productive 
  [] Description:          
 
[] O2 at ___ LPM 
[] High flow oxygen greater than 10 LPM 
[] Bi-Pap GI [x] Abdomen soft, tender to palpation   
[x] Ascites Fluid around abdomen 
[] Nausea 
[] Vomiting 
[x] Incontinent of bowels [x] Bowel sounds (yes/no) [] Diarrhea 
[] Constipation (see above including last bowel movement) [] Checked for impaction 
[x] Last BM 12/21 Nutrition Diet:__________ Appetite:  
[] Good  
[] Fair  
[x] Poor Bites only 
[] Tube Feeding  
[] Voiding 
[] Incontinent [x] Reyes Musculoskeletal 
[] Balance/[place] Unsteady [x] Weak Strength:  
 [] Normal  
 [] Limited [x] Decreasing Activities:  
 [] Up as tolerated 
 [x] Bedridden  
 [x] Specify: Pt allowed to be in the chair if he requests. SAFETY [x] 24 hr. Caregiver [x] Side rails ? [x] Hospital bed  
[x] Reviewed Falls & Safety ALLERGIES AND MEDICATIONS Allergies: Allergies Allergen Reactions  Prednisone Other (comments) Bloats him Current Facility-Administered Medications Medication Dose Route Frequency  sodium chloride (NS) flush  ketorolac (TORADOL) injection 30 mg  30 mg IntraVENous Q6H PRN  
 LORazepam (INTENSOL) 2 mg/mL oral concentrate 1 mg  1 mg SubLINGual Q2H PRN  
 glycopyrrolate (ROBINUL) injection 0.2 mg  0.2 mg IntraVENous Q4H PRN  
 bisacodyl (DULCOLAX) suppository 10 mg  10 mg Rectal DAILY PRN  
 HYDROmorphone (DILAUDID) 1 mg/mL oral solution 2 mg  2 mg SubLINGual Q2H PRN  
 sertraline (ZOLOFT) tablet 50 mg  50 mg Oral DAILY  simvastatin (ZOCOR) tablet 20 mg  20 mg Oral QHS  tamsulosin (FLOMAX) capsule 0.4 mg  0.4 mg Oral DAILY Visit Time In: 11:00 Visit Time Out: 12:00 Tammy Willett RN, [facility] Nurse Liaison [phone] Mobile [phone] Office

## 2019-12-23 NOTE — PROGRESS NOTES
Physical Therapy Screening: 
Services are not indicated at this time. An InBasket screening referral was triggered for physical therapy based on results obtained during the nursing admission assessment. The patients chart was reviewed and the patient is not appropriate for a skilled therapy evaluation at this time. Per chart, pt on comfort measures. Please consult physical therapy if any therapy needs arise. Thank you.  
 
Ye Pereira, PT

## 2019-12-23 NOTE — H&P
[facility] Good Help to Those in Need 
[phone] Patient Name: Yefri Umana YOB: 1929 Date of Provider Hospice Visit: 12/23/19 Level of Care:   [x] General Inpatient (GIP)    [] Routine   [] Respite Current Location of Care: 
[x] [facility] [] [facility] [] [id number] [] [facility] [] Hospice [facility], patient referred from: 
[] [facility] [] [facility] [] [address] [] [facility] [] Home [] Other:  
 
Date of Original Hospice Admission: 12/22/19 Hospice Medical Director at time of admission: Dr. Fredi Hart Principle Hospice Diagnosis: end stage heart failure Diagnoses RELATED to the terminal prognosis: Hypotension, unspecified hypotension type Lower GI bleed Hypertension, unspecified type Hematuria, unspecified type Urinary retention Rectal bleed Fecal impaction ([facility] [address] Encounter for hospice care Charley Washingtonan a 80 y.o. with ICM, CABG, chronic systolic CHF w/ EF 05-05 % (10/2019), afib s/p PPM, who was admitted to [facility]. The patient's principle diagnosis of End Stage Heart Failure has resulted in increased shortness of breath in the last few months, less energy, and worsening LE edema. He has been following closely with his cardiologist  [facility] who has been adjusting his diuretics. Since patient's October 2019 admission for cellulitis, his son has observed significant decline in his energy and activity. He was subsequently admitted again in December 2019 after a fall. He was found to have hypotension related to acutely decompensated CHF. He was evaluated by Cardiology and subseqently started on IV dobutamine. Patient did not notice a significant difference in his symptoms or QOL. The patient/family decided to discontinue IV dobutamine (stop date 12/22/19) and chose comfort measures with the support of Hospice. At time of my evaluation he is minimally responsive with a PPS of 20%.  
 
Objective information that support this patients limited prognosis includes: 
12/21 labs: proBNP >25,000, GFR 28, Cr 2.21 
 
10/28/19 Echo: 
Result status: Final result 
-Left Ventricle: Normal cavity size, wall thickness and diastolic function. Moderate global systolic dysfunction. Estimated left ventricular ejection fraction is 41 - 45%. No regional wall motion abnormality noted. Normal left ventricular strain. 
-Left Atrium: Moderately dilated left atrium. 
-Right Ventricle: Not well visualized. -Interatrial Septum: Color flow Doppler was used. -Pulmonary Artery: There is no evidence of pulmonary hypertension. 
-Mitral Valve: Mitral valve thickening. Mild to moderate mitral valve prolapse of the anterior leaflet and posterior leaflet. Late systolic mitral valve prolapse. Severe mitral valve regurgitation is present. HOSPICE DIAGNOSES Active Symptoms and Issues: 
-Dyspnea: related to heart failure and fluid overload. IV dobutamine dc'd on 12/22. 
-Generalized pain: 2 prns dilaudid and 2 prns toradol since yesterday evening 
-Anxiety/agitation/restlessness: 3 prns ativan since last evening 
-Dysphagia: no longer able to tolerate oral intake 
-Increased oropharyngeal secretions: d/t dysphagia. - ICD: ICD has been deactivated per Cardiologist's documentation on 12/22/19 PLAN 1. Start dilaudid 0.4 mg IV q30 minutes prn pain or air hunger d/t dysphagia and monitor for need to titrate and/or schedule 2. Start ativan 1 mg IV q1hr prn and monitor for need to titrate and/or schedule 3. Prn glycopyrrolate available for secretions. Can also utilizing recovery positioning 4. Prn bisacodyl suppository available 5. Prn toradol available for fever. Will try to limit use for pain in the setting of pt's kidney dysfunction (GFR <30) 6. Son may try mouth swabs soaked with pt's favorite liquids/drinks for comfot 7.  and SW to support family needs 8.  Disposition: GIP level of care is needed at this time for frequent skilled nursing assessment and administration of medications. He has had worsening sxs as is evidenced by his numerous prns over the last 24hrs and needs monitoring for adjustment/titration of his regimen. Additionally patient's dysphagia has worsened to the point that he would benefit from parenteral medications at this time. If his symptoms stabilize on a regimen which can be completed in the home setting, then he may able to be discharged to home with his son. 9. Hospice Plan of care was reviewed in detail and agree with current plan of care Prognosis estimated based on 12/23/19 clinical assessment is:  
[x] Hours to Days   
[] Days to Weeks   
[] Other: 
 
Communicated plan of care with: Hospice Case Manager; Hospice IDT; Care Team 
 
 GOALS OF CARE Patient/Medical POA stated Goal of Care: to optimize comfort [x] I have reviewed and/or updated ACP information in the Advance Care Planning Navigator. This information is available in the [facility] [address] link in the patient's chart header. Primary Decision [facility] Agent):   Primary Decision MakerShaider Dee Dee Child - [phone] Primary Decision Maker: Naty Grijalva - [phone] Resuscitation Status: DNR If DNR is there a Durable DNR on file? : [x] Yes [] No (If no, complete Durable DNR) HISTORY History obtained from: chart review, hospice IDT, nursing, pt's juan Orantes CHIEF COMPLAINT: patient unable to voice complaint The patient is:  
[] Verbal 
[x] Nonverbal 
[] Unresponsive HPI/SUBJECTIVE:  Juan Orantes at bedside this morning. States goal is to keep his father as comfortable as possible. He is pleased with level of comfort at this time. He got agitated with moving rooms yesterday but did settle down and have a more restful night. He has noticed his father has had difficulty taking a drink from a straw. On my visit he is unable to drink from straw at all.  Patient intermittently opens eyes but does not answer most questions. Occasionally tries to mouths answers. 24hr prns:  
dilaudid x 2 for pain 
toradol x 2 for pain Ativan x 3 for anxiety/agitation/restlessness REVIEW OF SYSTEMS The following systems were: [] reviewed  [x] unable to be reviewed as pt cannot participate in exam 
 
Positive ROS include: 
Constitutional: fatigue, weakness, in pain, short of breath Ears/nose/mouth/throat: increased airway secretions Respiratory:shortness of breath, wheezing Gastrointestinal:poor appetite, nausea, vomiting, abdominal pain, constipation, diarrhea Musculoskeletal:pain, deformities, swelling legs Neurologic:confusion, hallucinations, weakness Psychiatric:anxiety, feeling depressed, poor sleep Endocrine:  
 
Adult Non-Verbal Pain Assessment Score: 0 Face [x] 0   No particular expression or smile 
[] 1   Occasional grimace, tearing, frowning, wrinkled forehead 
[] 2   Frequent grimace, tearing, frowning, wrinkled forehead Activity (movement) [x] 0   Lying quietly, normal position 
[] 1   Seeking attention through movement or slow, cautious movement 
[] 2   Restless, excessive activity and/or withdrawal reflexes Guarding 
[x] 0   Lying quietly, no positioning of hands over areas of body 
[] 1   Splinting areas of the body, tense 
[] 2   Rigid, stiff Physiology (vital signs) [x] 0   Stable vital signs [] 1   Change in any of the following: SBP > 20mm Hg; HR > 20/minute 
[] 2   Change in any of the following: SBP > 30mm Hg; HR > 25/minute Respiratory [x] 0   Baseline RR/SpO2, compliant with ventilator 
[] 1   RR > 10 above baseline, or 5% drop SpO2, mild asynchrony with ventilator 
[] 2   RR > 20 above baseline, or 10% drop SpO2, asynchrony with ventilator FUNCTIONAL ASSESSMENT Palliative Performance Scale (PPS): 20 PSYCHOSOCIAL/SPIRITUAL ASSESSMENT Active Problems: 
  Heart failure with reduced ejection fraction, NYHA class III ([facility] [address] (12/22/2019) Past Medical History: Diagnosis Date  [facility] ([facility]  Atrial fibrillation ([facility]  BPH (benign prostatic hyperplasia)  Cancer ([facility] Skin  Chronic obstructive pulmonary disease ([facility]  Heart failure ([facility]  Hypertension  Pacemaker 2013 Med[employer]onic Pacer/Defibrillator that is not MRI compatible per [employer]. Past Surgical History:  
Procedure Laterality Date  CARDIAC SURG PROCEDURE UNLIST    
 cabg x 3  
 HX ORTHOPAEDIC    
 shoulder, hand and back surgery  HX PACEMAKER Social History Tobacco Use  Smoking status: Former Smoker Packs/day: 0.50 Years: 40.00 Pack years: 20.00 Last attempt to quit: 1984 Years since quittin.1  Smokeless tobacco: Never Used Substance Use Topics  Alcohol use: Yes Alcohol/week: 5.8 standard drinks Types: 7 Glasses of wine per week No family history on file. Allergies Allergen Reactions  Prednisone Other (comments) Bloats him Current Facility-Administered Medications Medication Dose Route Frequency  sodium chloride (NS) flush  LORazepam (INTENSOL) 2 mg/mL oral concentrate 1 mg  1 mg SubLINGual Q2H PRN  
 ketorolac (TORADOL) injection 30 mg  30 mg IntraVENous Q8H PRN  
 glycopyrrolate (ROBINUL) injection 0.2 mg  0.2 mg IntraVENous Q4H PRN  
 bisacodyl (DULCOLAX) suppository 10 mg  10 mg Rectal DAILY PRN  
 HYDROmorphone (DILAUDID) 1 mg/mL oral solution 2 mg  2 mg SubLINGual Q2H PRN  
 sertraline (ZOLOFT) tablet 50 mg  50 mg Oral DAILY  simvastatin (ZOCOR) tablet 20 mg  20 mg Oral QHS  tamsulosin (FLOMAX) capsule 0.4 mg  0.4 mg Oral DAILY PHYSICAL EXAM  
 
Wt Readings from Last 3 Encounters:  
19 100.6 kg (221 lb 12.5 oz) 19 96.6 kg (212 lb 14.4 oz) 19 95.6 kg (210 lb 12.2 oz) Visit Vitals Pulse 88 Resp 18 Supplemental O2  [] Yes  [x] NO Last bowel movement: 19 Currently this patient has: [x] Peripheral IV [] PICC  [] PORT [] ICD [x] Reyes Catheter [] NG Tube   [] PEG Tube   
[] Rectal Tube [] Drain 
[] Other:  
 
Constitutional: lying abed with eyes closed and hands folded across patient's chest, NAD, no brow furrow or grimace Eyes: intermittently opens eyes, no drainage ENMT: dry MM, no exudate Cardiovascular: irreg irreg, peripheral pulses palpable, pacemaker palpable Respiratory: shallow respirations, coarse sounds anteriorly Gastrointestinal: hypoactive BS, soft, ND Musculoskeletal: 3+ swelling of BLE Skin: chronic LE skin changes, warm, dry Neurologic: minimally responsive, intermittently opens eyes and mouths responses to questions Psychiatric: fatigued affect Pertinent Lab and or Imaging Tests: 
Lab Results Component Value Date/Time Sodium 137 12/21/2019 04:45 AM  
 Potassium 4.7 12/21/2019 04:45 AM  
 Chloride 105 12/21/2019 04:45 AM  
 CO2 26 12/21/2019 04:45 AM  
 Anion gap 6 12/21/2019 04:45 AM  
 Glucose 116 (H) 12/21/2019 04:45 AM  
 BUN 43 (H) 12/21/2019 04:45 AM  
 Creatinine 2.21 (H) 12/21/2019 04:45 AM  
 BUN/Creatinine ratio 19 12/21/2019 04:45 AM  
 GFR est AA 34 (L) 12/21/2019 04:45 AM  
 GFR est non-AA 28 (L) 12/21/2019 04:45 AM  
 Calcium 9.4 12/21/2019 04:45 AM  
 
Lab Results Component Value Date/Time Protein, total 7.2 12/18/2019 10:31 PM  
 Albumin 3.4 (L) 12/20/2019 03:00 AM  
 
   
 
Total time: 75 
Counseling / coordination time: 45 including counseling regarding symptoms, medication mgt, mgt of dysphagia and coordination of care with IDT, , and bedside nursing 
> 50% counseling / coordination?: yes David Amaya MD 
[facility]

## 2019-12-23 NOTE — PROGRESS NOTES
Problem: Breathing Pattern - Ineffective Goal: *Use of effective breathing techniques Outcome: Progressing Towards Goal 
Note:  
Patient will remain without signs of distress. Problem: Pressure Injury - Risk of 
Goal: *Prevention of pressure injury Outcome: Progressing Towards Goal 
Note:  
Encourage frequent repositioning for comfort and prevention of pressure ulcers.

## 2019-12-24 NOTE — HOSPICE
[facility] Good Help to Those in Need 
[phone] Discharge/Death Nursing Note Patient Name: Román Link YOB: 1929 Age: 80 y.o. Date of Death: 19 Admitted Date: 2019 Time of Death:  16:07 Facility of Care: [facility] Level of Care:  
Patient Room: [facility] Attending: Carlene Gunter MD 
Hospice Diagnosis: Heart failure with reduced ejection fraction, NYHA class III ([address] [I50.20] Death Pronouncement Pronouncement of death completed by: Dr. Mickey Castillo Agency staff WAS NOT present at the time of death At the time of death the patient was documented as  pulseless, blood pressure absent The pt  within [facility]:  
 
The following were notified of the patient's death: hospice liaison Mane Mathew, Dr. Summer Vargas, family notified by staff Medications were disposed of per facility protocol Discharge Summary Discharge Reason: Death Summary of Care Provided: 
 
[] Post mortem care provided by staff 
[] Notification of  home by staff 
[] Referrals/Community resources provided:  
[] Goals completed 
[] Durable Medical Equipment vendor notified Disciplines involved: [] RN [] SW []  [] MORIN [] Vol [] PT [] OT [] ST [] BC 
 
[] IDT communication/notification Attending Physician, Dr. Summer Vargas, notified of death Bereaved Verify bereaved identified with name, address, telephone number and risk level Advance Care Planning 2019 Patient's Healthcare Decision Maker is: Named in scanned ACP document Confirm Advance Directive Yes, on file Does the patient have other document types Do Not Resuscitate

## 2019-12-24 NOTE — HOSPICE
[facility] 
[phone] GIP Daily Nursing Note Patient Name: Susan Tran YOB: 1929 Age: 80 y.o. Date of Visit: 12/24/19 Facility of Care: [facility] Patient Room: [facility]/01 Hospice Attending: Girish Harrison MD 
Hospice Diagnosis: Heart failure with reduced ejection fraction, NYHA class III ([address] [I50.20] Level of Care: GIP Current GIP Symptoms 1. Pain 2. Agitation 3. SOB 4. Secretions ASSESSMENT & PLAN Must update Plan of Care including visit frequencies for IDT members 1. Pain: pt grimacing/moaning. Scheduled dilaudid with prn available for s/s of breakthrough pain 2. Agitation: movement of extremities with movement of head back and forth. Scheduled lorazepam with prn available 3. SOB: scheduled dilaudid for s/s of respiratory distress with prn dilaudid for breakthrough 4. Secretions: LS coarse, scheduled robinul Spiritual Interventions: Hospital and [address] available 24/7 Psych/ Social/ Emotional Interventions: no family present and pt is non-verbal 
 
Care Coordination Needs: communication with IDG and staff at [facility] for patient's plan of care Care plan and New Orders discussed / approved with Dean Wick MD. Description History and Chart Review If this is initial GIP note must document RN assessment/MD communication in previous setting. Specifically document nursing/medication needs in last 24 hours to support GIP care Narrative History of last 24 hours that demonstrates care cannot be provided in another setting: 
Frequent assessments by skilled medical staff with administration of IV medications that cannot be performed outside the inpatient setting. What has been done to control the patient's symptoms in the last 24 hours? PRN doses of lorazepam, robinul, dilaudid. Scheduled dilaudid and lorazepam. 
 
Does the patient currently require IV medications?  Yes 
 Does the patient currently require scheduled medications? Yes Does the patient currently require a PCA? No 
 
List number of doses of PRN medications in last 24 hours: 
Medication 1: dilaudid Number of doses: 9 Medication 2: lorazepam 
Number of doses: 8 Medication 3: toradol Number of doses: 2 Supporting documentation for GIP need for pain control: 
[x] Frequent evaluation by a doctor, nurse practitioner, nurse  
[x] Frequent medication adjustment   
[x] IVs that cannot be administered at home  
[x] Aggressive pain management  
[x] Complicated technical delivery of medications Supporting documentation for GIP need for symptom control: 
[x]  Sudden decline necessitating intensive nursing intervention 
[]  Uncontrolled / intractable nausea or vomiting  
[]  Pathological fractures 
[]  Advanced open wounds requiring frequent skilled care [x] Unmanageable respiratory distress 
[] New or worsening delirium  
[] Delirium with behavior issues: Is 24 hour caregiver present due to safety concerns with agitation? (yes/no) [x] Imminent death  with skilled nursing needs documented above DISCHARGE PLANNING Daily discharge planning required for GIP 1. Discharge Plan: Pt will more than likely  at [facility] 2. Patient/Family teaching: no family present 3. Response to patient/family teaching: pt unresponsive ASSESSMENT   
KARNOFSKY: 10 Prognosis estimated based on 19 clinical assessment is:  
[x] Few to Many Hours [] Hours to Days  
[] Few to Many Days  
[] Days to Weeks  
[] Few to Many Weeks  
[] Weeks to Months  
[] Few to Many Months Quality Measure: Patient self-reports:  [] Yes    [] No 
 
ESAS:  
Time of Assessment: 1115 Pain (1-10): 5 Fatigue (1-10): Shortness of breath (1-10): 5 Nausea (1-10): Appetite (1-10): Anxiety: (1-10): 5 Depression: (1-10): Well-being: (1-10): Constipation: _ Yes  _ No 
LAST BM:  
 
CLINICAL INFORMATION  
 
 Patient Vitals for the past 12 hrs: 
 Temp Pulse Resp BP SpO2  
12/24/19 0246 98.6 °F (37 °C) (!) 106 16 (!) 82/53 94 % Currently this patient has: 
[x] Supplemental O2 [x] IV   
[] PICC [] PORT  
[] NG Tube   
[] PEG Tube  
[] Ostomy    
[x] Reyes draining _______ urine 
[] Other:  
 
SIGNS/PHYSICAL FINDINGS Skin (including wound): 
[] Warm, dry, supple, intact and color normal for race [x] Warm  
[] Dry  
[] Cool    
[] Clammy      
[] Diaphoretic Turgor 
 [] Normal 
 [x] Decreased Color: 
 [] Pink 
 [] Pale 
 [] Cyanotic 
 [] Erythema 
 [] Jaundice [x] Normal for Race 
[]  Wounds: 
 
Neuro: 
[] Lethargy 
[x] Restlessness / agitation 
[] Confusion / delirium 
[] Hallucinations 
[] Responds to maximal stimulation 
[] Unresponsive 
[] Seizures Cardiac: 
[] Dyspnea on Exertion 
[] JVD [] Murmur 
[] Palpitations [] Hypotension 
[] Hypertension 
[x] Tachycardia [] Bradycardia 
[] Irregular HR 
[] Pulses Decreased 
[] Pulses Absent 
[] Edema:       (Location, Grade and Pitting) [] Mottling:      (Location) Respiratory: 
Breath sounds:  
 [] Diminished 
 [] Wheeze [x] Rhonchi 
 [] Rales  
[] Even and unlabored 
[x] Labored:           
[] Cough 
 [] Non Productive 
 [] Productive 
  [] Description:          
[] Deep suctioned  
[] O2 at ___ LPM 
[] High flow oxygen greater than 10 LPM 
[] Bi-Pap GI 
[] Abdomen (describe) [] Ascites 
[] Nausea 
[] Vomiting 
[] Incontinent of bowels 
[] Bowel sounds (yes/no) [] Diarrhea 
[] Constipation (see above including last bowel movement) [] Checked for impaction 
[] Last BM Nutrition Diet:____NPO______ Appetite:  
[] Good  
[] Fair  
[] Poor  
[] Tube Feeding  
[] Voiding 
[] Incontinent [x] Reyes Musculoskeletal 
[] Balance/Zimmerman Unsteady  
[] Weak Strength:  
 [] Normal  
 [] Limited [x] Decreasing Activities:  
 [] Up as tolerated 
 [x] Bedridden  
 [] Specify: 
 
SAFETY [x] 24 hr. Caregiver [x] Side rails ? [x] Hospital bed  
[x] Reviewed Falls & Safety ALLERGIES AND MEDICATIONS Allergies: Allergies Allergen Reactions  Prednisone Other (comments) Bloats him Current Facility-Administered Medications Medication Dose Route Frequency  ketorolac (TORADOL) injection 30 mg  30 mg IntraVENous Q6H PRN  
 LORazepam (ATIVAN) injection 1 mg  1 mg IntraVENous Q1H PRN  
 HYDROmorphone (PF) (DILAUDID) injection 0.4 mg  0.4 mg IntraVENous Q30MIN PRN  
 glycopyrrolate (ROBINUL) injection 0.2 mg  0.2 mg IntraVENous Q4H PRN  
 bisacodyl (DULCOLAX) suppository 10 mg  10 mg Rectal DAILY PRN  
 sertraline (ZOLOFT) tablet 50 mg  50 mg Oral DAILY  simvastatin (ZOCOR) tablet 20 mg  20 mg Oral QHS Visit Time In: 1256 Visit Time Out: 8089

## 2019-12-24 NOTE — PROGRESS NOTES
Notified by staff of the death of Mr Cici Chahal in room 625. Patient's two sons and his daughter-in-law were at the bedside. Offered condolences to family. Sons expressed their great appreciation of the care and support patient and family had received. With family's permission, had prayer of commendation for Mr Cici Chahal. Reassured them of continued prayers on their behalf. : Rev. Sarah Fernandez. Evette Alamo; [facility], to contact [phone] call: [phone]

## 2019-12-24 NOTE — PROGRESS NOTES
RN to pronounce the patient Went to evaluate the patient Patient apneic asystolic No gag or corneal reflex Time of death 4:07 PM

## 2019-12-24 NOTE — PROGRESS NOTES
Contacted Hospice at [phone] - received callback at [phone] spoke w Juhi Ok informed her of pts death. Contacted triage MD Dr Klaus Marie to pronounce pt - still awaiting his arrival. [employer] contacted pt is not eligible for donations secondary to age. 36 Dr Klaus Marie up to pronounce pt

## 2019-12-27 NOTE — DISCHARGE SUMMARY
Discharge Summary    [facility]  [facility]  [phone]      Date of Admission: 12/22/2019  Date of Discharge: 12/24/2019    Lashonda Jenkins is a 80y.o. year old who was admitted to [facility] at [facility] with a Hospice diagnosis of Heart failure with reduced ejection fraction, NYHA class III ([facility] [address] [I50.20]. Pt was admitted for [facility] level care. Per HPI:  \"Eugene Ladin is a 80 y.o. with ICM, CABG, chronic systolic CHF w/ EF 32-44 % (10/2019), afib s/p PPM, who was admitted to [facility]. The patient's principle diagnosis of End Stage Heart Failure has resulted in increased shortness of breath in the last few months, less energy, and worsening LE edema. He has been following closely with his cardiologist Dr. Carlene Barnes who has been adjusting his diuretics. Since patient's October 2019 admission for cellulitis, his son has observed significant decline in his energy and activity. He was subsequently admitted again in December 2019 after a fall. He was found to have hypotension related to acutely decompensated CHF. He was evaluated by Cardiology and subseqently started on IV dobutamine. Patient did not notice a significant difference in his symptoms or QOL. The patient/family decided to discontinue IV dobutamine (stop date 12/22/19) and chose comfort measures with the support of Hospice. At time of my evaluation he is minimally responsive with a PPS of 20%.     Objective information that support this patients limited prognosis includes:  12/21 labs: proBNP >25,000, GFR 28, Cr 2.21     10/28/19 Echo:  Result status: Final result  -Left Ventricle: Normal cavity size, wall thickness and diastolic function. Moderate global systolic dysfunction. Estimated left ventricular ejection fraction is 41 - 45%. No regional wall motion abnormality noted. Normal left ventricular strain.  -Left Atrium: Moderately dilated left atrium.  -Right Ventricle: Not well visualized.   -Interatrial Septum: Color flow Doppler was used. -Pulmonary Artery: There is no evidence of pulmonary hypertension.  -Mitral Valve: Mitral valve thickening. Mild to moderate mitral valve prolapse of the anterior leaflet and posterior leaflet. Late systolic mitral valve prolapse. Severe mitral valve regurgitation is present. \"    [facility] course:  Patient admitted to [facility] for uncontrolled dyspnea, generalized pain, anxiety/agitation. The patient's care was focused on comfort, and the patient passed away on 12/24/2019.     Brandin Stone MD

## 2020-01-03 ENCOUNTER — HOME CARE VISIT (OUTPATIENT)
Dept: HOSPICE | Facility: HOSPICE | Age: 85
End: 2020-01-03
Payer: MEDICARE